# Patient Record
Sex: FEMALE | Race: WHITE
[De-identification: names, ages, dates, MRNs, and addresses within clinical notes are randomized per-mention and may not be internally consistent; named-entity substitution may affect disease eponyms.]

---

## 2021-03-09 NOTE — STRESS TEST
DATE OF SERVICE:  03/09/2021



RESTING AND POST REGADENOSON TECHNETIUM-99M TETROFOSMIN SPECT CT IMAGING 



ORDERING PHYSICIAN:

Dr. Maya.



CLINICAL DIAGNOSIS:

Chest discomfort.



Baseline images were carried out after injection of 10.17 mCi of technetium-99m

Tetrofosmin.  This was followed by 0.4 mg regadenoson and 30 mCi of

technetium-99m Tetrofosmin for stress imaging.  The electrocardiogram showed

sinus rhythm at baseline.  There was left anterior fascicular block at baseline.

 There was nonspecific ST abnormality at baseline, which became somewhat more

prominent with regadenoson infusion and then returned to her baseline.  The

patient did not report chest discomfort.  Review of images at rest and following

stress indicates an anteroapical perfusion defect that appears predominantly

transient.  Gated images show anteroapical hypokinesis.  Left ventricular

ejection fraction is calculated to be 63%.  Left ventricular end diastolic

volume is 70 mL.



CONCLUSIONS:

1.  Anteroapical ischemia, at least moderate in amount.

2.  Anteroapical hypokinesis.

3.  Well preserved global left ventricular systolic function with a calculated

ejection fraction of 63%.





Job ID: [id number]

DocumentID: [id number]

Dictated Date:  03/09/2021 16:37:08

Transcription Date: 03/09/2021 17:11:03

Dictated By: EZEKIEL MAYA MD, MA, FACP, FACC,

## 2021-04-20 ENCOUNTER — HOSPITAL ENCOUNTER (OUTPATIENT)
Dept: HOSPITAL 75 - CATH | Age: 81
LOS: 1 days | Discharge: HOME | End: 2021-04-21
Attending: INTERNAL MEDICINE
Payer: MEDICARE

## 2021-04-20 VITALS — SYSTOLIC BLOOD PRESSURE: 136 MMHG | DIASTOLIC BLOOD PRESSURE: 62 MMHG

## 2021-04-20 VITALS — SYSTOLIC BLOOD PRESSURE: 136 MMHG | DIASTOLIC BLOOD PRESSURE: 68 MMHG

## 2021-04-20 VITALS — SYSTOLIC BLOOD PRESSURE: 154 MMHG | DIASTOLIC BLOOD PRESSURE: 70 MMHG

## 2021-04-20 VITALS — DIASTOLIC BLOOD PRESSURE: 72 MMHG | SYSTOLIC BLOOD PRESSURE: 152 MMHG

## 2021-04-20 VITALS — DIASTOLIC BLOOD PRESSURE: 60 MMHG | SYSTOLIC BLOOD PRESSURE: 134 MMHG

## 2021-04-20 VITALS — DIASTOLIC BLOOD PRESSURE: 68 MMHG | SYSTOLIC BLOOD PRESSURE: 154 MMHG

## 2021-04-20 VITALS — DIASTOLIC BLOOD PRESSURE: 76 MMHG | SYSTOLIC BLOOD PRESSURE: 181 MMHG

## 2021-04-20 VITALS — DIASTOLIC BLOOD PRESSURE: 53 MMHG | SYSTOLIC BLOOD PRESSURE: 112 MMHG

## 2021-04-20 VITALS — DIASTOLIC BLOOD PRESSURE: 65 MMHG | SYSTOLIC BLOOD PRESSURE: 145 MMHG

## 2021-04-20 VITALS — BODY MASS INDEX: 24.09 KG/M2 | WEIGHT: 149.91 LBS | HEIGHT: 66.14 IN

## 2021-04-20 VITALS — SYSTOLIC BLOOD PRESSURE: 147 MMHG | DIASTOLIC BLOOD PRESSURE: 66 MMHG

## 2021-04-20 VITALS — SYSTOLIC BLOOD PRESSURE: 154 MMHG | DIASTOLIC BLOOD PRESSURE: 66 MMHG

## 2021-04-20 VITALS — DIASTOLIC BLOOD PRESSURE: 71 MMHG | SYSTOLIC BLOOD PRESSURE: 164 MMHG

## 2021-04-20 VITALS — SYSTOLIC BLOOD PRESSURE: 130 MMHG | DIASTOLIC BLOOD PRESSURE: 58 MMHG

## 2021-04-20 DIAGNOSIS — Z88.8: ICD-10-CM

## 2021-04-20 DIAGNOSIS — Z80.9: ICD-10-CM

## 2021-04-20 DIAGNOSIS — I25.119: Primary | ICD-10-CM

## 2021-04-20 DIAGNOSIS — Z79.899: ICD-10-CM

## 2021-04-20 DIAGNOSIS — Z88.1: ICD-10-CM

## 2021-04-20 DIAGNOSIS — I10: ICD-10-CM

## 2021-04-20 DIAGNOSIS — Z91.018: ICD-10-CM

## 2021-04-20 DIAGNOSIS — Z83.3: ICD-10-CM

## 2021-04-20 DIAGNOSIS — E78.2: ICD-10-CM

## 2021-04-20 DIAGNOSIS — Z79.82: ICD-10-CM

## 2021-04-20 LAB
ALBUMIN SERPL-MCNC: 3.8 GM/DL (ref 3.2–4.5)
ALP SERPL-CCNC: 95 U/L (ref 40–136)
ALT SERPL-CCNC: 12 U/L (ref 0–55)
APTT BLD: 32 SEC (ref 24–35)
BILIRUB SERPL-MCNC: 0.3 MG/DL (ref 0.1–1)
BUN/CREAT SERPL: 20
CALCIUM SERPL-MCNC: 10 MG/DL (ref 8.5–10.1)
CHLORIDE SERPL-SCNC: 106 MMOL/L (ref 98–107)
CHOLEST SERPL-MCNC: 161 MG/DL (ref ?–200)
CO2 SERPL-SCNC: 23 MMOL/L (ref 21–32)
CREAT SERPL-MCNC: 1.22 MG/DL (ref 0.6–1.3)
GFR SERPLBLD BASED ON 1.73 SQ M-ARVRAT: 43 ML/MIN
GLUCOSE SERPL-MCNC: 104 MG/DL (ref 70–105)
HCT VFR BLD CALC: 36 % (ref 35–52)
HDLC SERPL-MCNC: 39 MG/DL (ref 40–60)
HGB BLD-MCNC: 10.6 G/DL (ref 11.5–16)
INR PPP: 1.1 (ref 0.8–1.4)
MCH RBC QN AUTO: 23 PG (ref 25–34)
MCHC RBC AUTO-ENTMCNC: 30 G/DL (ref 32–36)
MCV RBC AUTO: 77 FL (ref 80–99)
PLATELET # BLD: 443 10^3/UL (ref 130–400)
PMV BLD AUTO: 9.8 FL (ref 9–12.2)
POTASSIUM SERPL-SCNC: 3.6 MMOL/L (ref 3.6–5)
PROT SERPL-MCNC: 8 GM/DL (ref 6.4–8.2)
PROTHROMBIN TIME: 14.4 SEC (ref 12.2–14.7)
SODIUM SERPL-SCNC: 144 MMOL/L (ref 135–145)
TRIGL SERPL-MCNC: 106 MG/DL (ref ?–150)
VLDLC SERPL CALC-MCNC: 21 MG/DL (ref 5–40)
WBC # BLD AUTO: 9.1 10^3/UL (ref 4.3–11)

## 2021-04-20 PROCEDURE — 85610 PROTHROMBIN TIME: CPT

## 2021-04-20 PROCEDURE — 80061 LIPID PANEL: CPT

## 2021-04-20 PROCEDURE — 85730 THROMBOPLASTIN TIME PARTIAL: CPT

## 2021-04-20 PROCEDURE — 80053 COMPREHEN METABOLIC PANEL: CPT

## 2021-04-20 PROCEDURE — 87081 CULTURE SCREEN ONLY: CPT

## 2021-04-20 PROCEDURE — 93005 ELECTROCARDIOGRAM TRACING: CPT

## 2021-04-20 PROCEDURE — 93458 L HRT ARTERY/VENTRICLE ANGIO: CPT

## 2021-04-20 PROCEDURE — 36415 COLL VENOUS BLD VENIPUNCTURE: CPT

## 2021-04-20 PROCEDURE — 85027 COMPLETE CBC AUTOMATED: CPT

## 2021-04-20 PROCEDURE — 80048 BASIC METABOLIC PNL TOTAL CA: CPT

## 2021-04-20 RX ADMIN — SODIUM CHLORIDE SCH MLS/HR: 900 INJECTION, SOLUTION INTRAVENOUS at 07:26

## 2021-04-20 RX ADMIN — SODIUM CHLORIDE SCH MLS/HR: 900 INJECTION, SOLUTION INTRAVENOUS at 17:15

## 2021-04-20 RX ADMIN — CARVEDILOL SCH MG: 12.5 TABLET, FILM COATED ORAL at 21:10

## 2021-04-20 NOTE — CARDIAC CATHETERIZATION
DATE OF SERVICE:  04/20/2021



CARDIAC CATHETERIZATION AND CORONARY INTERVENTION REPORT



The patient is a 81-year-old lady, who has been experiencing symptoms of angina.

 Myocardial perfusion imaging had indicated anteroapical ischemia.  She had not

agreed to cardiac catheterization previously.  Because of continuing symptoms,

she agreed.  Informed consent was obtained for cardiac catheterization and

coronary intervention, if needed.



DESCRIPTION OF PROCEDURE:

She was brought to the cardiac catheterization laboratory in a fasting state. 

Right groin was prepared and draped in the usual sterile fashion.  Lidocaine 1%

was used for local anesthesia.  Modified Seldinger technique was used to advance

a 5-Iraqi sheath in right femoral artery.  We used 5-Iraqi JL4 catheter for

left coronary angiography, and 5-Iraqi JR4 catheter for right coronary

angiography.  We used a pigtail catheter for left heart catheterization and left

ventricular angiography.  Subsequently, percutaneous intervention was carried

out to the left anterior descending and it is first diagonal branch as is

described below.



PERCUTANEOUS INTERVENTION TO THE MID LEFT ANTERIOR DESCENDING ARTERY AND TO THE

FIRST DIAGONAL:

The sheath was exchanged over a wire for a 7-Iraqi sheath.  We used a 7-Iraqi

guide catheter.  We used two ChoICE floppy wires, one was advanced across the

lesion in the ostial part of the first diagonal and the tip was placed in the

distal vessel.  The second wire was advanced across the lesion in the proximal

and mid left anterior descending and the tip was placed in the distal vessel. 

We carried out kissing balloon angioplasty with a 2.0 x 20 mm balloon in the

diagonal and 2.0 x 30 mm balloon in the left anterior descending.  Subsequently,

following removal of balloons.  We stented the left anterior descending with

Alpine Xience 2.75 x 28 mm stent that was deployed at 14 atmospheres.  The wire

was retained in the diagonal and provided guidance for advancing the wire in the

left anterior descending into the diagonal and then the original wire in the

diagonal was removed intact.  We used the new wire in the diagonal to carry out

balloon angioplasty of the ostial part of the diagonal again.  Final results

were got.  There was no significant residual stenosis in the proximal and mid

left anterior descending.  There was approximately 40% stenosis in the ostial

first diagonal.  Angioplasty equipment was removed.  The sheath was sutured in

place.  The patient was transferred to the floor for manual sheath removal.



HEMODYNAMICS:

Left ventricular end-diastolic pressure following coronary angiography was 14

mmHg.  There was no significant pressure gradient on pullback across the aortic

valve.  Ascending aortic pressure was 106/56 with a mean of 78 mmHg.



CORONARY ANGIOGRAPHY:

Diffuse coronary calcification seen.  Left main coronary artery does not exhibit

significant obstructive disease.  Left anterior descending artery had 80% to 90%

mid vessel stenosis at the site of origin of a first diagonal.  This was a

bifurcation lesion.  Following balloon angioplasty in the first diagonal and the

stenting in the proximal and mid left anterior descending, there is no

significant residual stenosis.  The stent deployed in the proximal and mid left

anterior descending is Alpine Xience 2.75 x 28 mm.  The left circumflex artery

has approximately 50% mid vessel stenosis.  A small caliber first obtuse

marginal branch is occluded in its proximal portion.  The right coronary artery

is dominant and has multiple 40% stenosis in its proximal and mid portions.



LEFT VENTRICULAR ANGIOGRAPHY:

Left ventricular angiography was carried out in the right anterior oblique

projection.  Global left ventricular systolic function is normal to

hyperdynamic.  Left ventricular ejection fraction is 65% to 70%.



CONCLUSIONS:

1.  Coronary artery disease primarily consisting of up to 90% proximal and mid

vessel stenosis of the left anterior descending and approximately 80% stenosis

of the ostium of the first diagonal.  The left anterior descending artery was

treated with stenting with Alpine Xience 2.75 x 28 mm stent.  The first diagonal

was treated with balloon angioplasty of the ostium and the proximal portion.  
The

left circumflex artery has 50% mid vessel stenosis and its first obtuse marginal

branch (of a small caliber) is occluded in its proximal portion.  Right coronary

artery is dominant and has mild to moderate proximal disease.

2.  Normal to hyperdynamic left ventricular systolic function with ejection

fraction 65 to 70%.

3.  Mildly elevated left ventricular end-diastolic pressure.



DISCUSSION AND RECOMMENDATIONS:

Dual antiplatelet therapy has been added to the regimen.  Beta blockers are

being continued.  She states that she cannot take any cholesterol medicine

whatsoever.  Risk factor modification has been reviewed.





Job ID: [id number]

DocumentID: [id number]

Dictated Date:  04/20/2021 12:03:29

Transcription Date: 04/20/2021 12:48:41

Dictated By: EZEKIEL CAMPOS MD, MA, FACP, FACC,

MTDD

## 2021-04-20 NOTE — CARDIAC PROCEDURE NOTE-CS/ASA
Pre-Procedure Note


Pre-Op Procedure Note


H&P Reviewed


The H&P was reviewed, patient examined and no changes noted.


Date H&P Reviewed:  Apr 20, 2021


Time H&P Reviewed:  10:30





Conscious Sedation Pre-Proced


Time


10:30





ASA Score


3


For ASA 3 and 4: Consider anesthesia and medical clearance. Also, for patients

with a history of failed moderate sedation consider anesthesia.

















Airway 


 


Lungs 


 


Heart 


 


 ASA score


 


 ASA 1: a normal healthy patient


 


 ASA 2:  a patient with a mild systemic disease (mid diabetes, controlled 

hypertension, obesity 


 


 ASA 3:  a patient with a severe systemic disease that limits activity  (angina,

COPD, prior Myocardial infarction)


 


 ASA 4:  a patient with an incapacitating disease that is a constant threat to 

life (CHF, renal failure)


 


 ASA 5:  a moribund patient not expected to survive 24 hrs.  (ruptured aneurysm)


 


 ASA 6:  a declared brain-dead patient whose organs are being harvested.


 


 For emergent operations, add the letter E after the classification











Mallampati Classification


Grade 2





Sedation Plan


Analgesia, Amnesia, Plan communicated to team members, Discussed options with 

patient/fam, Discussed risks with patient/fam


The patient is an appropriate candidate to undergo the planned procedure, 

sedation, and anesthesia.





The patient immediately re-assessed prior to indication.











EZEKIEL CAMPOS MD FACP FAC CCDS   Apr 20, 2021 11:51

## 2021-04-21 VITALS — SYSTOLIC BLOOD PRESSURE: 98 MMHG | DIASTOLIC BLOOD PRESSURE: 53 MMHG

## 2021-04-21 VITALS — SYSTOLIC BLOOD PRESSURE: 145 MMHG | DIASTOLIC BLOOD PRESSURE: 61 MMHG

## 2021-04-21 VITALS — SYSTOLIC BLOOD PRESSURE: 120 MMHG | DIASTOLIC BLOOD PRESSURE: 55 MMHG

## 2021-04-21 LAB
BUN/CREAT SERPL: 26
CALCIUM SERPL-MCNC: 8.8 MG/DL (ref 8.5–10.1)
CHLORIDE SERPL-SCNC: 106 MMOL/L (ref 98–107)
CO2 SERPL-SCNC: 24 MMOL/L (ref 21–32)
CREAT SERPL-MCNC: 0.95 MG/DL (ref 0.6–1.3)
GFR SERPLBLD BASED ON 1.73 SQ M-ARVRAT: 57 ML/MIN
GLUCOSE SERPL-MCNC: 92 MG/DL (ref 70–105)
HCT VFR BLD CALC: 30 % (ref 35–52)
HGB BLD-MCNC: 8.6 G/DL (ref 11.5–16)
MCH RBC QN AUTO: 22 PG (ref 25–34)
MCHC RBC AUTO-ENTMCNC: 29 G/DL (ref 32–36)
MCV RBC AUTO: 78 FL (ref 80–99)
PLATELET # BLD: 306 10^3/UL (ref 130–400)
PMV BLD AUTO: 10.1 FL (ref 9–12.2)
POTASSIUM SERPL-SCNC: 3.9 MMOL/L (ref 3.6–5)
SODIUM SERPL-SCNC: 140 MMOL/L (ref 135–145)
WBC # BLD AUTO: 6.9 10^3/UL (ref 4.3–11)

## 2021-04-21 RX ADMIN — CARVEDILOL SCH MG: 12.5 TABLET, FILM COATED ORAL at 08:31

## 2021-04-21 RX ADMIN — SODIUM CHLORIDE SCH MLS/HR: 900 INJECTION, SOLUTION INTRAVENOUS at 03:40

## 2021-04-21 NOTE — PROGRESS NOTE - CARDIOLOGY
Cardiology SOAP Progress Note


Objective:


I&O/Vital Signs











 21





 00:00 00:00 01:00 04:00


 


Pulse  61 63 59


 


Resp  18  14


 


B/P (MAP)  98/53 (68)  120/55 (76)


 


Pulse Ox 94 97  99


 


O2 Delivery Nasal Cannula Nasal Cannula  Nasal Cannula


 


O2 Flow Rate 2.00 2.00  2.00





 21





 04:00 06:30 08:00 08:00


 


Temp   36.8 


 


Pulse  58  83


 


Resp    18


 


B/P (MAP)    145/61 (89)


 


Pulse Ox 94   97


 


O2 Delivery Nasal Cannula   Nasal Cannula


 


O2 Flow Rate 2.00   2.00


 


    





 21   





 08:00   


 


Pulse Ox 96   


 


O2 Delivery Room Air   














 21





 00:00


 


Intake Total 250 ml


 


Output Total 450 ml


 


Balance -200 ml








Constitutional:  AAO x 3, well-developed, well-nourished


Cardiovascular:  regular rate-rhythm; No JVD; S1 and S2


Gastrointestional:  No tender; soft, round


Extremities:  no lower extremity edema bilateral


Neurologic/Psychiatric:  grossly intact (moves all extremities)


Skin:  No rash on exposed areas, No ulcerations on exposed areas





Results/Procedures:


Labs


Laboratory Tests


21 02:38: 


White Blood Count 6.9, Red Blood Count 3.86, Hemoglobin 8.6L, Hematocrit 30L, 

Mean Corpuscular Volume 78L, Mean Corpuscular Hemoglobin 22L, Mean Corpuscular 

Hemoglobin Concent 29L, Red Cell Distribution Width 16.0H, Platelet Count 306, 

Mean Platelet Volume 10.1, Sodium Level 140, Potassium Level 3.9, Chloride Level

106, Carbon Dioxide Level 24, Anion Gap 10, Blood Urea Nitrogen 25H, Creatinine 

0.95, Estimat Glomerular Filtration Rate 57, BUN/Creatinine Ratio 26, Glucose 

Level 92, Calcium Level 8.8





Microbiology


21 MRSA Screen - Final, Complete


          








A/P:


Assessment:


CAD


- MPI of 3-9-21: Anteroapical ischemia, at least moderate in amount. 

Anteroapical hypokinesis. Well preserved global left ventricular systolic 

function with a calculated ejection fraction of 63%.


- Coronary artery disease primarily consisting of up to 90% proximal and mid 

vessel stenosis of the left anterior descending and approximately 80% stenosis 

of the ostium of the first diagonal.  The left anterior descending artery was 

treated with stenting with Alpine Xience 2.75 x 28 mm stent.  The first diagonal

was treated with balloon angioplasty of the ostium in proximal portions.  The 

left circumflex artery has 50% mid vessel stenosis in its first obtuse marginal 

branch (of a small caliber) is occluded in its proximal portion.  Right coronary

artery is dominant and has mild to moderate proximal disease. Normal to 

hyperdynamic left ventricular systolic function with ejection fraction 65 to 

70%. Mildly elevated left ventricular end-diastolic pressure.





Microcytic, hypochromic anemia of undetermined etiology, managed by pcp (H/H 

10.5/33.9 and MCV 80 on 2021)





Renal insufficiency likely CKD 2-3 and superimposed prerenal azotemia (BUN/Cr 

60/1.99 on 21 and 33/1.22 after d/c HCTZ)





Involuntary wgt loss (60 lbs since 2019) of undetermined etiology





Echo of 3/9/21: LVEF 55-60%, grade 2 diastolic dysfunction, mild to mod LAE, mod

TR, mild AI, mod TR, PASP 65-70 mmHg





HTN: on chronic diuretic therapy





HLD: refuses stain





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





GI history: cholecystectomy in 2019; endoscopy in 2019 that showed mild 

gastritis (per pt report)





Reported intolerance to statin d/t joint pain





Fam h/o early CAD (father  of MI at age 54)


Plan:


S/P successful PCI on 21


Continue current medications


Reported intolerance to statin tx and does not wish to take


OK to discharge home today


Out pt f/u in 2-3 weeks











LUZ CACERES           2021 08:07

## 2021-04-21 NOTE — DISCHARGE INST-CARDIOLOGY
Discharge Inst-Cardiac


Discharge Medications


New Medications:  


Aspirin (Children's Aspirin) 81 Mg Tab.chew


81 MG PO DAILY, #90 TAB 3 Refills





 


Continued Medications:  


Allopurinol (Allopurinol) 300 Mg Tablet


300 MG PO DAILY, TAB





Carvedilol (Carvedilol) 25 Mg Tablet


25 MG PO BID, TAB





Clopidogrel Bisulfate (Clopidogrel) 75 Mg Tablet


75 MG PO DAILY, TAB





Ferrous Sulfate (Iron) 325 Mg Tablet


325 MG PO DAILY, TAB





Isosorbide Mononitrate (Isosorbide Mononitrate ER) 30 Mg Tab.er.24h


30 MG PO DAILY, TAB





Nitroglycerin (Nitroglycerin) 0.4 Mg Tab.subl


0.4 MG SL UD PRN for CHEST PAIN, TAB








New, Converted or Re-Newed RX:  Transmitted to Pharmacy





Patient Instructions


Patient Instructions:  


Please schedule follow up appointment to see Dr. Maya in 2-3 weeks











LUZ CACERES           Apr 21, 2021 09:14

## 2021-04-21 NOTE — PROGRESS NOTE - CARDIOLOGY
Cardiology SOAP Progress Note


Subjective:


No cp or palp or syncope or shortness of breath


Some gen malaise


No groin or leg discomfort


No n/v/d





Objective:


I&O/Vital Signs











 21





 06:30 08:00 08:00 08:00


 


Temp  36.8  


 


Pulse 58  83 


 


Resp   18 


 


B/P (MAP)   145/61 (89) 


 


Pulse Ox   97 96


 


O2 Delivery   Nasal Cannula Room Air


 


O2 Flow Rate   2.00 


 


    





 21 





 09:00 10:00 11:00 


 


Pulse 66 62  


 


Resp 27 26  


 


B/P (MAP)    


 


Pulse Ox 99 100  


 


O2 Delivery Nasal Cannula Nasal Cannula  


 


O2 Flow Rate 2.00 2.00  














 21





 00:00


 


Intake Total 250 ml


 


Output Total 450 ml


 


Balance -200 ml








Groin site without hematoma:  Yes


Bruising:  mild bruising


Constitutional:  AAO x 3, well-developed, well-nourished


Cardiovascular:  regular rate-rhythm; No JVD; S1 and S2


Gastrointestional:  No tender; soft, round


Extremities:  no lower extremity edema bilateral


Neurologic/Psychiatric:  grossly intact (moves all extremities)


Skin:  No rash on exposed areas, No ulcerations on exposed areas





Results/Procedures:


Labs


Laboratory Tests


21 02:38: 


White Blood Count 6.9, Red Blood Count 3.86, Hemoglobin 8.6L, Hematocrit 30L, 

Mean Corpuscular Volume 78L, Mean Corpuscular Hemoglobin 22L, Mean Corpuscular 

Hemoglobin Concent 29L, Red Cell Distribution Width 16.0H, Platelet Count 306, 

Mean Platelet Volume 10.1, Sodium Level 140, Potassium Level 3.9, Chloride Level

106, Carbon Dioxide Level 24, Anion Gap 10, Blood Urea Nitrogen 25H, Creatinine 

0.95, Estimat Glomerular Filtration Rate 57, BUN/Creatinine Ratio 26, Glucose 

Level 92, Calcium Level 8.8





Microbiology


21 MRSA Screen - Final, Complete


          








A/P:


Assessment:





CAD


- MPI of 3-9-21: Anteroapical ischemia, at least moderate in amount. 

Anteroapical hypokinesis. Well preserved global left ventricular systolic 

function with a calculated ejection fraction of 63%.


- Card cath 21: up to 90% proximal and mid vessel stenosis of the left 

anterior descending and approximately 80% stenosis of the ostium of the first 

diagonal.  The left anterior descending artery was treated with stenting with 

Alpine Xience 2.75 x 28 mm stent.  The first diagonal was treated with balloon 

angioplasty of the ostium in proximal portions.  The left circumflex artery has 

50% mid vessel stenosis in its first obtuse marginal branch (of a small caliber)

is occluded in its proximal portion.  Right coronary artery is dominant and has 

mild to moderate proximal disease. Normal to hyperdynamic left ventricular 

systolic function with ejection fraction 65 to 70%. Mildly elevated left ventri

cular end-diastolic pressure.





Microcytic, hypochromic anemia of undetermined etiology, managed by pcp (H/H 

10.5/33.9 and MCV 80 on 2021)





Renal insufficiency likely CKD 2-3 and superimposed prerenal azotemia (BUN/Cr 

60/1.99 on 21 and 33/1.22 after d/c HCTZ)





Involuntary wgt loss (60 lbs since 2019) of undetermined etiology





Echo of 3/9/21: LVEF 55-60%, grade 2 diastolic dysfunction, mild to mod LAE, mod

TR, mild AI, mod TR, PASP 65-70 mmHg





HTN: on chronic diuretic therapy





HLD: refuses stain





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





GI history: cholecystectomy in 2019; endoscopy in 2019 that showed mild 

gastritis (per pt report)





Reported intolerance to statin d/t joint pain





Fam h/o early CAD (father  of MI at age 54)


Plan:





I discussed her cath findings with her and the interventions undertaken


We advised compliance with meds


We discussed risk factor mod. I answered her questions


Reported intolerance to statin tx and does not wish to take


OK to discharge home today


Out pt f/u in 2-3 weeks











EZEKIEL CAMPOS MD [facility]   2021 16:47

## 2021-05-25 ENCOUNTER — HOSPITAL ENCOUNTER (OUTPATIENT)
Dept: HOSPITAL 75 - CATH | Age: 81
End: 2021-05-25
Attending: INTERNAL MEDICINE
Payer: MEDICARE

## 2021-05-25 VITALS — HEIGHT: 66.14 IN | WEIGHT: 154.32 LBS | BODY MASS INDEX: 24.8 KG/M2

## 2021-05-25 VITALS — SYSTOLIC BLOOD PRESSURE: 137 MMHG | DIASTOLIC BLOOD PRESSURE: 73 MMHG

## 2021-05-25 VITALS — DIASTOLIC BLOOD PRESSURE: 70 MMHG | SYSTOLIC BLOOD PRESSURE: 133 MMHG

## 2021-05-25 VITALS — DIASTOLIC BLOOD PRESSURE: 70 MMHG | SYSTOLIC BLOOD PRESSURE: 126 MMHG

## 2021-05-25 VITALS — SYSTOLIC BLOOD PRESSURE: 123 MMHG | DIASTOLIC BLOOD PRESSURE: 60 MMHG

## 2021-05-25 VITALS — SYSTOLIC BLOOD PRESSURE: 120 MMHG | DIASTOLIC BLOOD PRESSURE: 65 MMHG

## 2021-05-25 VITALS — SYSTOLIC BLOOD PRESSURE: 132 MMHG | DIASTOLIC BLOOD PRESSURE: 82 MMHG

## 2021-05-25 VITALS — SYSTOLIC BLOOD PRESSURE: 113 MMHG | DIASTOLIC BLOOD PRESSURE: 66 MMHG

## 2021-05-25 VITALS — SYSTOLIC BLOOD PRESSURE: 129 MMHG | DIASTOLIC BLOOD PRESSURE: 84 MMHG

## 2021-05-25 VITALS — DIASTOLIC BLOOD PRESSURE: 61 MMHG | SYSTOLIC BLOOD PRESSURE: 123 MMHG

## 2021-05-25 DIAGNOSIS — I70.1: ICD-10-CM

## 2021-05-25 DIAGNOSIS — I27.21: ICD-10-CM

## 2021-05-25 DIAGNOSIS — Z80.9: ICD-10-CM

## 2021-05-25 DIAGNOSIS — I34.0: ICD-10-CM

## 2021-05-25 DIAGNOSIS — I25.10: ICD-10-CM

## 2021-05-25 DIAGNOSIS — N28.9: ICD-10-CM

## 2021-05-25 DIAGNOSIS — Z83.3: ICD-10-CM

## 2021-05-25 DIAGNOSIS — I11.9: ICD-10-CM

## 2021-05-25 DIAGNOSIS — R09.89: ICD-10-CM

## 2021-05-25 DIAGNOSIS — D50.9: ICD-10-CM

## 2021-05-25 DIAGNOSIS — Z79.82: ICD-10-CM

## 2021-05-25 DIAGNOSIS — Z90.49: ICD-10-CM

## 2021-05-25 DIAGNOSIS — I70.202: ICD-10-CM

## 2021-05-25 DIAGNOSIS — E78.2: ICD-10-CM

## 2021-05-25 DIAGNOSIS — I99.8: ICD-10-CM

## 2021-05-25 DIAGNOSIS — Z82.49: ICD-10-CM

## 2021-05-25 DIAGNOSIS — I10: ICD-10-CM

## 2021-05-25 DIAGNOSIS — I77.1: Primary | ICD-10-CM

## 2021-05-25 DIAGNOSIS — Z79.899: ICD-10-CM

## 2021-05-25 DIAGNOSIS — G47.33: ICD-10-CM

## 2021-05-25 LAB
ALBUMIN SERPL-MCNC: 3.9 GM/DL (ref 3.2–4.5)
ALP SERPL-CCNC: 104 U/L (ref 40–136)
ALT SERPL-CCNC: 11 U/L (ref 0–55)
APTT BLD: 31 SEC (ref 24–35)
BILIRUB SERPL-MCNC: 0.4 MG/DL (ref 0.1–1)
BUN/CREAT SERPL: 25
CALCIUM SERPL-MCNC: 9.9 MG/DL (ref 8.5–10.1)
CHLORIDE SERPL-SCNC: 103 MMOL/L (ref 98–107)
CHOLEST SERPL-MCNC: 171 MG/DL (ref ?–200)
CO2 SERPL-SCNC: 27 MMOL/L (ref 21–32)
CREAT SERPL-MCNC: 1.25 MG/DL (ref 0.6–1.3)
GFR SERPLBLD BASED ON 1.73 SQ M-ARVRAT: 41 ML/MIN
GLUCOSE SERPL-MCNC: 123 MG/DL (ref 70–105)
HCT VFR BLD CALC: 37 % (ref 35–52)
HDLC SERPL-MCNC: 43 MG/DL (ref 40–60)
HGB BLD-MCNC: 10.9 G/DL (ref 11.5–16)
INR PPP: 1 (ref 0.8–1.4)
MCH RBC QN AUTO: 23 PG (ref 25–34)
MCHC RBC AUTO-ENTMCNC: 29 G/DL (ref 32–36)
MCV RBC AUTO: 78 FL (ref 80–99)
PLATELET # BLD: 407 10^3/UL (ref 130–400)
PMV BLD AUTO: 10.3 FL (ref 9–12.2)
POTASSIUM SERPL-SCNC: 3.9 MMOL/L (ref 3.6–5)
PROT SERPL-MCNC: 7.8 GM/DL (ref 6.4–8.2)
PROTHROMBIN TIME: 13.8 SEC (ref 12.2–14.7)
SODIUM SERPL-SCNC: 140 MMOL/L (ref 135–145)
TRIGL SERPL-MCNC: 99 MG/DL (ref ?–150)
VLDLC SERPL CALC-MCNC: 20 MG/DL (ref 5–40)
WBC # BLD AUTO: 9.5 10^3/UL (ref 4.3–11)

## 2021-05-25 PROCEDURE — 80053 COMPREHEN METABOLIC PANEL: CPT

## 2021-05-25 PROCEDURE — 75625 CONTRAST EXAM ABDOMINL AORTA: CPT

## 2021-05-25 PROCEDURE — 87081 CULTURE SCREEN ONLY: CPT

## 2021-05-25 PROCEDURE — 80061 LIPID PANEL: CPT

## 2021-05-25 PROCEDURE — 85027 COMPLETE CBC AUTOMATED: CPT

## 2021-05-25 PROCEDURE — 75716 ARTERY X-RAYS ARMS/LEGS: CPT

## 2021-05-25 PROCEDURE — 85730 THROMBOPLASTIN TIME PARTIAL: CPT

## 2021-05-25 PROCEDURE — 85610 PROTHROMBIN TIME: CPT

## 2021-05-25 PROCEDURE — 36415 COLL VENOUS BLD VENIPUNCTURE: CPT

## 2021-05-25 NOTE — CARDIAC PROCEDURE NOTE-CS/ASA
Pre-Procedure Note


Pre-Op Procedure Note


H&P Reviewed


The H&P was reviewed, patient examined and no changes noted.


Date H&P Reviewed:  May 25, 2021


Time H&P Reviewed:  10:30





Conscious Sedation Pre-Proced


Time


10:30





ASA Score


3


For ASA 3 and 4: Consider anesthesia and medical clearance. Also, for patients

with a history of failed moderate sedation consider anesthesia.

















Airway 


 


Lungs 


 


Heart 


 


 ASA score


 


 ASA 1: a normal healthy patient


 


 ASA 2:  a patient with a mild systemic disease (mid diabetes, controlled 

hypertension, obesity 


 


 ASA 3:  a patient with a severe systemic disease that limits activity  (angina,

COPD, prior Myocardial infarction)


 


 ASA 4:  a patient with an incapacitating disease that is a constant threat to 

life (CHF, renal failure)


 


 ASA 5:  a moribund patient not expected to survive 24 hrs.  (ruptured aneurysm)


 


 ASA 6:  a declared brain-dead patient whose organs are being harvested.


 


 For emergent operations, add the letter E after the classification











Mallampati Classification


Grade 2





Sedation Plan


Analgesia, Amnesia, Plan communicated to team members, Discussed options with 

patient/fam, Discussed risks with patient/fam


The patient is an appropriate candidate to undergo the planned procedure, 

sedation, and anesthesia.





The patient immediately re-assessed prior to indication.











EZEKIEL CAMPOS MD FACP FAC CCDS   May 25, 2021 11:17

## 2021-05-25 NOTE — DISCHARGE INST-POST CATH
Discharge Inst-CATH/EP


Post Cardiac Cath/EP D/C Inst


Follow Up/Plan


F/u with Dr Maya next week





No smoking








ACTIVITY





* Go Home directly and rest.


* Limit activity of the leg (or wrist if it was used) for 7 days including 

aerobics, swimming,


   jogging, bicycling, etc.


* Restrict stair-climbing for 7 days if possible, if not, climb up with your 

non-cath leg, then


   bring together on the same step.


* Avoid lifting, pushing, pulling or excessive movement of the affected 

extremity for 7 days.


* Customary sexual activity may be resumed after 2 days-use caution not to use a

position  


   that strains or causes pain to the affected extremity.


* No driving for 24 hours.


* NO SMOKING. 


* Avoid straining for bowel movements for 7 days.


* Gentle walking on level ground is allowed.


* Returning to work will depend on the type of procedure and the results. Your 

doctor will discuss


   this with you.





CALL YOUR DOCTOR FOR ANY OF THE FOLLOWING:





*If bleeding from the puncture site occurs- Apply gentle pressure to site with 

clean cloth and call


   your doctor or EMS.


* If a knot or lump forms under the skin, increases in size, or causes pain.


* If bruising appears to be worsening or moving further down your leg instead of

disappearing.


* Temperature above 101 F.





CARE OF YOUR GROIN INCISION;





* Bruising or purple discoloration of the skin near the puncture site is common.


* You may shower only, no bathtub bathing for 5 days.  Be careful to avoid 

slipping as your


   leg may feel stiff.


* If a closure device was used on your femoral artery, please see the attached 

guide regarding


   care of the device and your leg.


* Leave dressing on FOR 24 hours.





CARE OF YOUR WRIST INCISION;





* Bruising or purple discoloration of the skin near the puncture site is common.


* You may shower.


* DO NOT submerge wrist.


* Leave dressing on FOR 24 hours.











EZEKIEL MAYA MD FACP FAC CCDS   May 25, 2021 11:22

## 2021-05-25 NOTE — DISCHARGE INST-CARDIOLOGY
Discharge Inst-Cardiac


Discharge Medications


Continued Medications:  


Allopurinol (Allopurinol) 300 Mg Tablet


300 MG PO DAILY, TAB





Aspirin (Children's Aspirin) 81 Mg Tab.chew


81 MG PO DAILY, #90 TAB 3 Refills





Carvedilol (Carvedilol) 25 Mg Tablet


25 MG PO BID, TAB





Clopidogrel Bisulfate (Clopidogrel) 75 Mg Tablet


75 MG PO DAILY, TAB





Ferrous Sulfate (Iron) 325 Mg Tablet


325 MG PO DAILY, TAB





Isosorbide Mononitrate (Isosorbide Mononitrate ER) 30 Mg Tab.er.24h


30 MG PO DAILY, TAB





Nitroglycerin (Nitroglycerin) 0.4 Mg Tab.subl


0.4 MG SL UD PRN for CHEST PAIN, TAB











Patient Instructions


Patient Instructions:  


No smoking











EZEKIEL CAMPOS MD FACP FAC CCDS   May 25, 2021 11:23

## 2021-05-25 NOTE — OPERATIVE REPORT
DATE OF SERVICE:  05/25/2021



ABDOMINAL AORTIC AND PERIPHERAL ANGIOGRAPHY REPORT



The patient is a 81-year-old lady who has leg claudication that affects mostly

her right lower limb.  Noninvasive workup has been indicative of significant

disease.  Peripheral angiography was recommended and informed consent was

obtained.



DESCRIPTION OF PROCEDURE:

She was brought to the cardiac catheterization laboratory in a fasting state. 

Left groin was prepared and draped in the usual sterile fashion.  Lidocaine 1%

was used for local anesthesia.  Modified Seldinger technique was used to advance

a 5-Togolese sheath in the left femoral artery.  We used a 5-Togolese pigtail

catheter and placed it at the level of L1.  We performed abdominal aortic

angiography.  We then pulled the pigtail catheter down to just above the level

of the aortoiliac bifurcation and bilateral leg artery angiography was performed

with runoff down to the level of the ankles.  Angiography of the right femoral

artery was carried out through the sheath.  Mynx was used to achieve hemostasis

following sheath removal.  She tolerated the procedure well.



ABDOMINAL AORTIC ANGIOGRAPHY:

Abdominal aortic angiography did not indicate any abdominal aortic aneurysm,

dissection or abdominal aortic stenosis.  Renal arteries are identified.  There

is 80% to 90% ostial and proximal stenosis of the right renal artery.  There is

approximately 50% stenosis of the ostial portion of the left renal artery.  The

aortoiliac bifurcation is intact and does not exhibit significant disease.  No

significant disease is seen in the proximal part of the iliac arteries.



BILATERAL LEG ARTERY ANGIOGRAPHY:

Bilateral leg artery angiography indicated intact iliac and common femoral

arteries on both sides.  On the right side, the superficial femoral artery

exhibits a long segment of complete occlusion.  That starts in the proximal

portion of the right superficial femoral artery and the artery then

reconstitutes very distally at the level of the right popliteal artery.  The

right popliteal artery has a single vessel runoff, which appears to be deep

peroneal artery.  The anterior and posterior tibial arteries appear occluded. 

On the left side, the superficial femoral artery has multiple severe stenoses of

up to 80% to 90%.  The popliteal artery is intact.  There is a 2-vessel runoff

in the left leg and these appeared to be posterior tibial and peroneal arteries.

 These arteries do have significant proximal vessel stenoses of up to 80%.



CONCLUSIONS:

1.  80 to 90% ostial stenosis of the right renal artery.

2.  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

that reconstitutes at the level of the right popliteal artery and then there is

a single vessel runoff in the right leg.

3.  Multiple 80% to 90% stenosis in the left superficial femoral artery and a

2-vessel runoff in the left leg (these two runoff vessels have severe proximal

disease, as well).



DISCUSSION AND RECOMMENDATIONS:

Based on these findings, it appears that femoral-popliteal surgery should be

considered for the right side.  Right side is the leg that has claudication. 

She is not reporting much claudication on the left side.  Accordingly, we plan

to treat the right leg first.  We called Dr. Tamez of cardiovascular surgical

service at [facility] and spoke with him and have made a referral to him

for further evaluation and treatment.  Her antiplatelet regimen is being

continued.  She may be a good candidate for statin therapy, but she refuses.  We

have advised her to quit smoking immediately and completely.  Risk factor

modification has been reviewed with her.  Outpatient followup is advised.





Job ID: [id number]

DocumentID: [id number]

Dictated Date:  05/25/2021 11:32:19

Transcription Date: 05/25/2021 11:59:11

Dictated By: EZEKIEL CAMPOS MD, MA, FACP, FACC,

## 2021-10-05 ENCOUNTER — HOSPITAL ENCOUNTER (OUTPATIENT)
Dept: HOSPITAL 75 - CATH | Age: 81
Discharge: HOME | End: 2021-10-05
Attending: INTERNAL MEDICINE
Payer: MEDICARE

## 2021-10-05 VITALS — BODY MASS INDEX: 25.1 KG/M2 | WEIGHT: 154.32 LBS | HEIGHT: 65.75 IN

## 2021-10-05 VITALS — SYSTOLIC BLOOD PRESSURE: 174 MMHG | DIASTOLIC BLOOD PRESSURE: 79 MMHG

## 2021-10-05 VITALS — DIASTOLIC BLOOD PRESSURE: 89 MMHG | SYSTOLIC BLOOD PRESSURE: 193 MMHG

## 2021-10-05 VITALS — DIASTOLIC BLOOD PRESSURE: 70 MMHG | SYSTOLIC BLOOD PRESSURE: 159 MMHG

## 2021-10-05 VITALS — DIASTOLIC BLOOD PRESSURE: 72 MMHG | SYSTOLIC BLOOD PRESSURE: 166 MMHG

## 2021-10-05 VITALS — DIASTOLIC BLOOD PRESSURE: 66 MMHG | SYSTOLIC BLOOD PRESSURE: 161 MMHG

## 2021-10-05 VITALS — DIASTOLIC BLOOD PRESSURE: 67 MMHG | SYSTOLIC BLOOD PRESSURE: 161 MMHG

## 2021-10-05 VITALS — DIASTOLIC BLOOD PRESSURE: 70 MMHG | SYSTOLIC BLOOD PRESSURE: 161 MMHG

## 2021-10-05 DIAGNOSIS — E78.5: ICD-10-CM

## 2021-10-05 DIAGNOSIS — Z88.8: ICD-10-CM

## 2021-10-05 DIAGNOSIS — Z82.49: ICD-10-CM

## 2021-10-05 DIAGNOSIS — I11.9: ICD-10-CM

## 2021-10-05 DIAGNOSIS — N28.9: ICD-10-CM

## 2021-10-05 DIAGNOSIS — Z95.5: ICD-10-CM

## 2021-10-05 DIAGNOSIS — Z79.82: ICD-10-CM

## 2021-10-05 DIAGNOSIS — R63.4: ICD-10-CM

## 2021-10-05 DIAGNOSIS — I73.9: ICD-10-CM

## 2021-10-05 DIAGNOSIS — Z88.5: ICD-10-CM

## 2021-10-05 DIAGNOSIS — Z88.1: ICD-10-CM

## 2021-10-05 DIAGNOSIS — D50.9: ICD-10-CM

## 2021-10-05 DIAGNOSIS — I27.21: ICD-10-CM

## 2021-10-05 DIAGNOSIS — Z79.899: ICD-10-CM

## 2021-10-05 DIAGNOSIS — I25.10: Primary | ICD-10-CM

## 2021-10-05 LAB
ALBUMIN SERPL-MCNC: 3.8 GM/DL (ref 3.2–4.5)
ALP SERPL-CCNC: 116 U/L (ref 40–136)
ALT SERPL-CCNC: 11 U/L (ref 0–55)
APTT BLD: 30 SEC (ref 24–35)
BILIRUB SERPL-MCNC: 0.3 MG/DL (ref 0.1–1)
BUN/CREAT SERPL: 27
CALCIUM SERPL-MCNC: 10.2 MG/DL (ref 8.5–10.1)
CHLORIDE SERPL-SCNC: 106 MMOL/L (ref 98–107)
CHOLEST SERPL-MCNC: 173 MG/DL (ref ?–200)
CO2 SERPL-SCNC: 27 MMOL/L (ref 21–32)
CREAT SERPL-MCNC: 1.15 MG/DL (ref 0.6–1.3)
GFR SERPLBLD BASED ON 1.73 SQ M-ARVRAT: 46 ML/MIN
GLUCOSE SERPL-MCNC: 104 MG/DL (ref 70–105)
HCT VFR BLD CALC: 37 % (ref 35–52)
HDLC SERPL-MCNC: 42 MG/DL (ref 40–60)
HGB BLD-MCNC: 11 G/DL (ref 11.5–16)
INR PPP: 1 (ref 0.8–1.4)
MCH RBC QN AUTO: 24 PG (ref 25–34)
MCHC RBC AUTO-ENTMCNC: 30 G/DL (ref 32–36)
MCV RBC AUTO: 81 FL (ref 80–99)
PLATELET # BLD: 380 10^3/UL (ref 130–400)
PMV BLD AUTO: 9.8 FL (ref 9–12.2)
POTASSIUM SERPL-SCNC: 3.9 MMOL/L (ref 3.6–5)
PROT SERPL-MCNC: 7.7 GM/DL (ref 6.4–8.2)
PROTHROMBIN TIME: 14.1 SEC (ref 12.2–14.7)
SODIUM SERPL-SCNC: 144 MMOL/L (ref 135–145)
TRIGL SERPL-MCNC: 84 MG/DL (ref ?–150)
VLDLC SERPL CALC-MCNC: 17 MG/DL (ref 5–40)
WBC # BLD AUTO: 9.1 10^3/UL (ref 4.3–11)

## 2021-10-05 PROCEDURE — 85610 PROTHROMBIN TIME: CPT

## 2021-10-05 PROCEDURE — 85730 THROMBOPLASTIN TIME PARTIAL: CPT

## 2021-10-05 PROCEDURE — 80053 COMPREHEN METABOLIC PANEL: CPT

## 2021-10-05 PROCEDURE — 93458 L HRT ARTERY/VENTRICLE ANGIO: CPT

## 2021-10-05 PROCEDURE — 36415 COLL VENOUS BLD VENIPUNCTURE: CPT

## 2021-10-05 PROCEDURE — 80061 LIPID PANEL: CPT

## 2021-10-05 PROCEDURE — 85027 COMPLETE CBC AUTOMATED: CPT

## 2021-10-05 PROCEDURE — 87081 CULTURE SCREEN ONLY: CPT

## 2021-10-05 NOTE — XMS REPORT
MU2 Ambulatory Summary

                             Created on: 2021



Laury Og

External Reference #: [id number]

: 1940

Sex: Female



Demographics





                          Address                   [address]

[place]  [address]

 

                          Home Phone                [phone]

 

                          Preferred Language        English

 

                          Marital Status            

 

                          Rastafarian Affiliation     Unknown

 

                          Race                      White

 

                          Ethnic Group              Not  or 





Author





                          Author                    Laury Conley

 

                          Organization              [facility]

[place]

 

                          Address                   [address]

[place], [place]  [id number]



 

                          Phone                     [phone]







Care Team Providers





                    Care Team Member Name Role                Phone

 

                    Joe Conley     PCP                 [phone]

 

                    Joe Conley     PreferredProvider   [phone]







Allergies and Adverse Reactions





                    Name                Reaction            Notes

 

                    erythromycin        vomiting             

 

                    STATINS: HMG-COA REDUCTASE INHIBITORS joint pain           

 

                    BANANAS             severe stomache pain  

 

                    Demerol             vomiting             







Plan of Treatment





             Planned Activity Comments     Planned Date Planned Time Plan/Goal

 

             gastritis and anemia, needing endsocpy                             

            

 

             EKG.                      2018   12:00 AM      

 

             CBC W/ AUTO DIFF (RFLX MAN DIFF IF IND).              2021     

12:00 AM      







Medications





                                        Active 

 

             Name         Start Date   Estimated Completion Date SIG          Co

mments

 

             aspirin 81 mg oral tablet                           take 1 tablet b

y oral route daily  

 

             biotin 5 mg oral capsule                           take 1 capsule b

y oral route daily  

 

                vitamin B12-folic acid 500-400 mcg oral tablet                  

               take 1 tablet by oral route 

daily                                    

 

                ibuprofen 800 mg oral tablet                                 noemi

e 1 tablet (800 mg) by oral route 3 times 

per day with food                        

 

                potassium chloride 20 mEq oral tablet extended release 2019

                       take 1 tablet

(20 meq) by oral route 2 times per day with food  

 

                Lomotil 2.5-0.025 mg oral tablet                                

 take 2 tablets (5 mg) by oral route once 

daily as needed                          

 

                Levaquin 500 mg oral tablet                                 take

 1 tablet (500 mg) by oral route once daily 

for 7 days                               

 

                hydrochlorothiazide 25 mg oral tablet 2019                

      TAKE 1 TABLET BY MOUTH EVERY 

DAY                                      

 

                HYDROCHLOROTHIAZIDE 25MG TABLETS 12/10/2020      2021      

 TAKE 1 TABLET BY MOUTH 

EVERY DAY                                

 

                clopidogrel 75 mg oral tablet                                 ta

ke 1 tablet (75 mg) by oral route once daily

for 30 days                              

 

                Iron (ferrous sulfate) 325 mg (65 mg iron) oral tablet 2021       take 

1 tablet (325 mg) by oral route once daily for 90 days  

 

                isosorbide mononitrate 30 mg oral tablet extended release 24 hr 

                                take 1 

tablet (30 mg) by oral route once daily in the morning for 90 days  

 

                melatonin 3 mg oral tablet 2021       take 

1 tablet by oral route daily

for 90 days                              

 

                nitroglycerin 0.4 mg sublingual tablet, sublingual 2021       give 1 

tablet sublingually every 5min. as needed for chest pain times 3 doses, if not 
relieved go to ER                        

 

                pantoprazole 40 mg oral tablet,delayed release (DR/EC) 2021

                       TAKE 1 TABLET

BY MOUTH EVERY DAY                       

 

                Protonix 40 mg oral tablet,delayed release (DR/EC) 2021       take 1 

tablet (40 mg) by oral route once daily for 90 days  

 

             allopurinol 300 mg oral tablet 2021                 TAKE 1 TAB

LET BY MOUTH ONCE DAILY  

 

                FERROUS SULFATE 325MG (5GR) TABS 2021      

 TAKE 1 TABLET BY MOUTH 

EVERY DAY                                

 

                carvedilol 25 mg oral tablet 9/3/2021        2022        noemi

e 1 tablet (25 mg) by oral 

route every 12 hours with food for 30 days  

 

                amoxicillin 875 mg oral tablet 9/3/2021        9/10/2021       t

irvin 1 tablet (875 mg) by oral

route every 12 hours for 7 days          







                                         

 

             Name         Start Date   Expiration Date SIG          Comments

 

             Biotin Oral                                          

 

                Zithromax Z-Jones 250 mg oral tablet 5/3/2010        2010     

   take 2 tablets (500 mg) by 

oral route once daily for 1 day then 1 tablet (250 mg) by oral route once daily 
for 4 days                               

 

                Coreg 12.5 mg oral tablet 2010        1BID -

 TAKE ONE TABLET BY MOUTH 

TWICE DAILY                              

 

                ALLOPURINOL 300MG    each 2011        2/3/2011       

 1QD - TAKE ONE TABLET BY 

MOUTH EVERY DAY                          

 

                Edarbyclor 40-12.5 mg oral tablet 4/15/2013       2013    

  take 1 tablet by oral 

route once daily                         

 

                carvedilol 12.5 mg oral tablet 2013       T

IRVIN ONE TABLET BY MOUTH TWICE

DAILY                                    

 

                gemfibrozil 600 mg oral tablet 2013       T

IRVIN ONE TABLET BY MOUTH TWICE

DAILY (BEFORE  MORNING  AND  EVENING  MEALS)  

 

                losartan-hydrochlorothiazide 100-25 mg oral tablet 2013    

   10/16/2013      TAKE ONE

TABLET BY MOUTH IN THE MORNING           

 

                Levaquin 500 mg oral tablet 2013      take

 1 tablet (500 mg) by oral 

route once daily for 7 days              

 

                Sporanox 100 mg oral capsule                                 noemi

e 1 capsule (100 mg) by oral route once 

daily with food                          

 

                Sunscreen SPF 30 2015                       apply q2h while

 outdoors, q1h if swimming or 

sweating                                 

 

             Vaseline     2015                 apply to treated area  

 

                urea 40 % topical cream 2015                       apply to

 the affected area(s) by topical 

route once a day                         

 

                econazole 1 % topical cream 2015                       appl

y to the affected and surrounding 

areas of skin by topical route once daily  

 

                Zetia 10 mg oral tablet                                 take 1 t

ablet (10 mg) by oral route once daily for 

30 days                                  

 

                amoxicillin 875 mg oral tablet                                 t

irvin 1 tablet (875 mg) by oral route every 12

hours for 10 days, finish 16        

 

                Nexium 40 mg oral capsule,delayed release(DR/EC) 2016      

 10/24/2016      take 1 

capsule (40 mg) by oral route once daily for 4 weeks  

 

                Keflex 500 mg oral capsule 4/3/2017        2017       take 

1 capsule (500 mg) by oral 

route every 12 hours for 14 days         

 

                Cipro 500 mg oral tablet                                 take 1 

tablet (500 mg) by oral route every 12 hours

for 7 days                               

 

                Hyzaar 100-25 mg oral tablet 2018       noemi

e 1 tablet by oral route once

daily for 30 days                        

 

                Naprosyn 500 mg oral tablet 2018       take

 1 tablet (500 mg) by oral 

route 2 times per day with food for 14 days  

 

                Cipro 250 mg oral tablet                                 take 1 

tablet (250 mg) by oral route every 12 hours

for 5 days                               

 

                Zithromax 500 mg oral tablet 3/21/2019       3/28/2019       noemi

e 1 tablet (500 mg) by oral 

route once daily for 7 days              

 

                Carafate 1 gram oral tablet 2019       take

 1 tablet by oral route 4 

times a day for 14 days                  

 

                Suprep Bowel Prep Kit 17.5-3.13-1.6 gram oral recon soln 20

19                       take as 

directed                                 

 

                Augmentin 875-125 mg oral tablet 10/11/2019      10/18/2019     

 take 1 tablet by oral 

route every 12 hours for 7 days          

 

                gemfibrozil 600 mg oral tablet 2019        T

IRVIN 1 TABLET BY MOUTH TWICE 

DAILY BEFORE MORNING AND EVENING MEALS for 30 days  







                                        Discontinued 

 

             Name         Start Date   Discontinued Date SIG          Comments

 

                Vaseretic 10-25 mg oral tablet                 3/1/2011        t

irvin 1 tablet by oral route once daily

                                         

 

                Omnaris 50 mcg nasal spray,non-aerosol 2010 

       inhale 2 sprays (100 

mcg) in each nostril by intranasal route once daily  

 

                Mobic 15 mg oral tablet 2010       3/1/2011        take 1 t

ablet (15 mg) by oral route 

once daily                               

 

                Cipro 500 mg oral tablet                 2013        take 1 

tablet (500 mg) by oral route every 

12 hours for 7 days                      

 

                amoxicillin 500 mg oral capsule                 2013        

take 1 capsule (500 mg) by oral route

3 times per day x 7days                  

 

                Levaquin 500 mg oral tablet                 2013        take

 1 tablet (500 mg) by oral route once

daily for 7 days                         

 

                hydrocortisone 2.5 % topical cream 2014    

   apply to the affected 

area(s) by topical route once daily      

 

                losartan-hydrochlorothiazide 100-25 mg oral tablet 10/13/2014   

   2015        TAKE ONE 

TABLET BY MOUTH IN THE MORNING           

 

                Hyzaar 100-25 mg oral tablet 2017        noemi

e 1 tablet by oral route once

daily for 30 days                        

 

                Diflucan 150 mg oral tablet                 2018       take

 1 tablet (150 mg) by oral route 

once                                     

 

                Macrobid 100 mg oral capsule 10/12/2017      2018       noemi

e 1 capsule (100 mg) by 

oral route every 12 hours with food for 7 days  

 

                Cipro 500 mg oral tablet 10/17/2017      2018       take 1 

tablet (500 mg) by oral 

route every 12 hours for 7 days          

 

                Iron (ferrous sulfate) 325 mg (65 mg iron) oral tablet          

       2021       take 1 tablet

(325 mg) by oral route 2 times per day   

 

                Dexilant 60 mg oral capsule,biphase delayed releas 2019       take 1 

capsule (60 mg) by oral route once daily  







Problem List





                    Description         Status              Onset

 

                    Hypertension        Active               

 

                    Gout                Active               

 

                    Heart disease       Active               

 

                    Hyperlipidemia      Active               

 

                    Arthritis unspecified Active               

 

                    Actinic Keratosis   Active              2015

 

                    Onychomycosis       Active              2015

 

                    Seborrheic keratoses Active              2015

 

                    BCC (basal cell carcinoma of skin) Active              

 

                    Keratosis           Active              2017

 

                    Dyspepsia           Active              2019

 

                    Change in bowel habit Active              2019







Vital Signs





     Date Time BP-Sys(mm[Hg] BP-Ester(mm[Hg]) HR(bpm) RR(rpm) Temp WT   HT   HC   

BMI  BSA  BMI

 Percentile                             O2 Sat(%)

 

        9/3/2021 10:48:00  mm[Hg] 70 mm[Hg] 74 {beats}/min 20 rpm  98.1 F 

 161.5 lbs

           66 in                 26.0665 kg/m2 1.847 m2              97 %

 

        2021 10:04:00  mm[Hg] 72 mm[Hg] 70 {beats}/min 18 rpm  98.6 F

  150.25 

lbs        66 in                 24.25 kg/m2 1.78 m2               98 %

 

        10/11/2019 10:55:00  mm[Hg] 60 mm[Hg] 65 {beats}/min 18 rpm  97.9 

F  141 lbs

           66 in                 22.7578 kg/m2 1.7258 m2             98 %

 

        2019 1:58:00  mm[Hg] 72 mm[Hg] 65 {beats}/min 20 rpm  97 F    

145.5 lbs 66

 in                       23.48 kg/m2  1.75 m2                    

 

        2019 8:53:00  mm[Hg] 63 mm[Hg] 70 {beats}/min 70 rpm  97.5 F 

 145.5 lbs

           66 in                 23.4841 kg/m2 1.7531 m2              

 

        2019 8:47:00  mm[Hg] 63 mm[Hg] 75 {beats}/min 20 rpm  98.8 F 

 150 lbs 

66 in                     24.21 kg/m2  1.78 m2                    

 

        2019 9:02:00  mm[Hg] 78 mm[Hg] 65 {beats}/min 18 rpm  98.4 F 

 156.375 

lbs        67 in                 24.4915 kg/m2 1.8311 m2             98 %

 

        2019 9:50:00  mm[Hg] 68 mm[Hg] 68 {beats}/min 18 rpm  97.9 F 

 156 lbs 

67 in                     24.43 kg/m2  1.83 m2                   100 %

 

        2019 9:10:00  mm[Hg] 70 mm[Hg] 65 {beats}/min 18 rpm  97.2 F 

 158.5 lbs

           67 in                 24.8244 kg/m2 1.8435 m2             97 %

 

        3/21/2019 8:53:00  mm[Hg] 72 mm[Hg] 80 {beats}/min 20 rpm  97.5 F 

 154.375 

lbs        67 in                 24.18 kg/m2 1.82 m2               96 %

 

        2019 8:54:00  mm[Hg] 72 mm[Hg] 64 {beats}/min 14 rpm  97.9 F  

156 lbs 67

 in                       24.4328 kg/m2 1.8289 m2                 98 %

 

        2018 11:03:00  mm[Hg] 70 mm[Hg] 68 {beats}/min 16 rpm  98.1 

F  163 lbs

           67 in                 25.53 kg/m2 1.87 m2               98 %

 

        2018 8:39:00  mm[Hg] 74 mm[Hg] 65 {beats}/min 18 rpm  97.7 F  

171.25 lbs

           67 in                 26.8213 kg/m2 1.9163 m2             97 %

 

        2018 9:06:00  mm[Hg] 92 mm[Hg] 62 {beats}/min 16 rpm  96.1 F 

 169 lbs 

67 in                     26.47 kg/m2  1.90 m2                   97 %

 

        10/12/2017 9:18:00  mm[Hg] 70 mm[Hg] 61 {beats}/min 14 rpm  97.3 F

  168.25 

lbs        67 in                 26.3514 kg/m2 1.8994 m2             99 %

 

        2017 8:13:00  mm[Hg] 76 mm[Hg] 69 {beats}/min 18 rpm  97.3 F  

172 lbs 67

 in                       26.94 kg/m2  1.92 m2                   98 %

 

        2017 11:06:00  mm[Hg] 98 mm[Hg] 62 {beats}/min 16 rpm  96.9 F

  178 lbs 

67 in                     27.8785 kg/m2 1.9537 m2                 9 %

 

        2017 10:35:00  mm[Hg] 88 mm[Hg] 67 {beats}/min 20 rpm  97.8 F 

 179 lbs 

67 in                     28.04 kg/m2  1.96 m2                    

 

        3/24/2017 9:50:00  mm[Hg] 88 mm[Hg] 67 {beats}/min 20 rpm  97.8 F 

 179 lbs 

67 in                     28.0351 kg/m2 1.9591 m2                  

 

        3/1/2017 9:25:00  mm[Hg] 78 mm[Hg] 62 {beats}/min 17 rpm  97.6 F  

180 lbs 67

 in                       28.19 kg/m2  1.96 m2                   97 %

 

        2016 10:56:00  mm[Hg] 88 mm[Hg] 60 {beats}/min 18 rpm  97.6 F

  179 lbs 

67 in                     28.0351 kg/m2 1.9591 m2                 96 %

 

        2016 8:24:00  mm[Hg] 70 mm[Hg] 72 {beats}/min 18 rpm  97.5 F 

 174 lbs 

67 in                     27.25 kg/m2  1.93 m2                   99 %

 

        2015 1:40:00  mm[Hg] 72 mm[Hg] 57 {beats}/min 18 rpm  98.5 F  

191.25 lbs

           67 in                 29.9537 kg/m2 2.0251 m2             96 %

 

        2014 2:11:00  mm[Hg] 64 mm[Hg] 74 {beats}/min 20 rpm  97.6 F 

 205 lbs 

67 in                     32.11 kg/m2  2.10 m2                    

 

        2014 9:28:00  mm[Hg] 80 mm[Hg] 72 {beats}/min 18 rpm  96.8 F 

 203 lbs 

67 in                     31.794 kg/m2 2.0863 m2                  

 

        10/7/2013 2:22:00  mm[Hg] 75 mm[Hg] 70 {beats}/min 18 rpm  98 F   

 206 lbs 67 

in                        32.26 kg/m2  2.10 m2                   95 %

 

        2013 9:42:00  mm[Hg] 72 mm[Hg] 68 {beats}/min 18 rpm  97.4 F  

205 lbs 67

 in                       32.1072 kg/m2 2.0966 m2                  

 

        3/15/2013 10:41:00  mm[Hg] 70 mm[Hg] 60 {beats}/min 18 rpm  97.2 F

  201 lbs 

66 in                     32.44 kg/m2  2.06 m2                    

 

        2012 1:46:00  mm[Hg] 78 mm[Hg] 60 {beats}/min 18 rpm  97.2 F

  203 lbs 

                                                                  

 

        2012 8:48:00  mm[Hg] 80 mm[Hg] 62 {beats}/min 18 rpm  97 F   

 207 lbs 66 

in                        33.41 kg/m2  2.091 m2                   

 

        3/31/2011 9:58:00  mm[Hg] 82 mm[Hg] 68 {beats}/min 20 rpm  97.2 F 

 203 lbs  

                                                                  

 

      3/1/2011 9:05:00  mm[Hg] 74 mm[Hg] 64 {beats}/min 20 rpm 97 F  203 l

bs             

                                                             

 

      2010 9:03:00  mm[Hg] 72 mm[Hg] 58 {beats}/min 20 rpm       203 

lbs                   

                                                     

 

        2010 8:40:00  mm[Hg] 88 mm[Hg] 68 {beats}/min 18 rpm  96.9 F 

 209 lbs  

                                                                  







Social History





                    Name                Description         Comments

 

                    denies alcohol use                       

 

                    Lives with spouse                        

 

                    Tobacco             Never smoker         







History of Procedures





                    Date Ordered        Description         Order Status

 

                    2016 12:00 AM  COMPLETE CBC W/AUTO DIFF WBC Reviewed

 

                    2016 12:00 AM  COMPREHEN METABOLIC PANEL Reviewed

 

                    2016 12:00 AM  LIPID PANEL         Reviewed

 

                    2016 12:00 AM  GLYCOSYLATED HEMOGLOBIN TEST Reviewed

 

                    2012 12:00 AM  COMPLETE CBC W/AUTO DIFF WBC Reviewed

 

                    2012 12:00 AM  COMPREHEN METABOLIC PANEL Reviewed

 

                    2012 12:00 AM  LIPID PANEL         Reviewed

 

                    2012 12:00 AM  GLYCOSYLATED HEMOGLOBIN TEST Reviewed

 

                    2012 12:00 AM  ASSAY THYROID STIM HORMONE Reviewed

 

                    2012 12:00 AM  ASSAY OF BLOOD/URIC ACID Reviewed

 

                    2012 12:00 AM  URINALYSIS AUTO W/SCOPE Reviewed

 

                    3/1/2017 12:00 AM   COMPLETE CBC W/AUTO DIFF WBC Reviewed

 

                    3/1/2017 12:00 AM   COMPREHEN METABOLIC PANEL Reviewed

 

                    3/1/2017 12:00 AM   LIPID PANEL         Reviewed

 

                    3/1/2017 12:00 AM   ASSAY THYROID STIM HORMONE Reviewed

 

                    3/1/2017 12:00 AM   VITAMIN B-12        Reviewed

 

                    3/24/2017 12:00 AM  CMPLX RPR F/C/C/M/N/AX/G/H/F Reviewed

 

                    2017 12:00 AM   COMPLETE CBC W/AUTO DIFF WBC Returned

 

                    2017 12:00 AM   COMPREHEN METABOLIC PANEL Returned

 

                    2017 12:00 AM   ASSAY THYROID STIM HORMONE Returned

 

                    2017 12:00 AM   VITAMIN B-12        Returned

 

                    2017 12:00 AM   ASSAY OF BLOOD/URIC ACID Returned

 

                    2017 12:00 AM   X-RAY EXAM OF HAND  Returned

 

                    2017 12:00 AM   ASSAY OF PREALBUMIN Returned

 

                    10/12/2017 12:00 AM URINE CULTURE/COLONY COUNT Reviewed

 

                    10/12/2017 9:44 AM  URINALYSIS AUTO W/O SCOPE Reviewed

 

                    2018 12:00 AM  COMPLETE CBC W/AUTO DIFF WBC Returned

 

                    2018 12:00 AM  COMPREHEN METABOLIC PANEL Returned

 

                    2018 12:00 AM  LIPID PANEL         Returned

 

                    2018 12:00 AM  GLYCOSYLATED HEMOGLOBIN TEST Returned

 

                    2018 12:00 AM  ASSAY OF BLOOD/URIC ACID Returned

 

                    2018 12:00 AM  ASSAY OF URINE/URIC ACID Returned

 

                    3/4/2013 12:00 AM   CHEST X-RAY 2VW FRONTAL&LATL Reviewed

 

                    2010 12:00 AM  COMPLETE CBC W/AUTO DIFF WBC Reviewed

 

                    2010 12:00 AM  COMPREHEN METABOLIC PANEL Reviewed

 

                    2010 12:00 AM  LIPID PANEL         Reviewed

 

                    2010 12:00 AM  ASSAY OF BLOOD/URIC ACID Reviewed

 

                    2010 12:00 AM  GLYCOSYLATED HEMOGLOBIN TEST Reviewed

 

                    2018 12:00 AM CHEST X-RAY 2VW FRONTAL&LATL Returned

 

                    2018 12:00 AM COMPLETE CBC W/AUTO DIFF WBC Returned

 

                    2018 12:00 AM COMPREHEN METABOLIC PANEL Returned

 

                    2018 12:00 AM ASSAY OF TROPONIN QUANT Returned

 

                    2013 12:00 AM   COMPLETE CBC W/AUTO DIFF WBC Reviewed

 

                    2013 12:00 AM   COMPREHEN METABOLIC PANEL Reviewed

 

                    2013 12:00 AM   GLYCOSYLATED HEMOGLOBIN TEST Reviewed

 

                    2019 12:00 AM  METABOLIC PANEL TOTAL CA Returned

 

                    2019 12:00 AM  CT ABD & PELV W/CONTRAST Returned

 

                    2019 12:00 AM   US EXAM ABDO BACK WALL COMP Returned

 

                    2019 3:40 PM   URINALYSIS AUTO W/O SCOPE Reviewed

 

                    2019 12:00 AM  COMPLETE CBC W/AUTO DIFF WBC Reviewed

 

                    2019 12:00 AM  COMPREHEN METABOLIC PANEL Reviewed

 

                    2019 12:00 AM  ELECTROCARDIOGRAM TRACING Reviewed

 

                    2019 12:00 AM  ASSAY OF TROPONIN QUANT Reviewed

 

                    2019 12:00 AM  FECES CULTURE AEROBIC BACT Reviewed

 

                    2019 12:00 AM  C DIFF AMPLIFIED PROBE Reviewed

 

                    2019 12:00 AM  Tick Panel          Reviewed

 

                    2019 12:00 AM  METABOLIC PANEL TOTAL CA Returned

 

                    2019 12:00 AM  COMPLETE CBC W/AUTO DIFF WBC Returned

 

                    2019 12:00 AM  VITAMIN B-12        Returned

 

                    2019 12:00 AM  ASSAY OF IRON       Returned

 

                    6/10/2019 12:00 AM  ECHO EXAM OF ABDOMEN Reviewed

 

                    6/10/2019 12:00 AM  HEPATOBILIARY SYSTEM IMAGING Reviewed

 

                    2019 12:00 AM  METABOLIC PANEL TOTAL CA Returned

 

                    2019 12:00 AM  ASSAY OF IRON       Returned

 

                    10/11/2019 12:00 AM CT THORAX W/DYE     Returned

 

                    10/14/2019 12:00 AM METABOLIC PANEL TOTAL CA Returned

 

                    2020 12:00 AM   COMPLETE CBC W/AUTO DIFF WBC Returned

 

                    2020 12:00 AM   COMPREHEN METABOLIC PANEL Returned

 

                    2020 12:00 AM   LIPID PANEL         Returned

 

                    2020 12:00 AM   CT THORAX W/DYE     Returned

 

                    2021 12:00 AM  COMPLETE CBC W/AUTO DIFF WBC Returned

 

                    2021 12:00 AM  COMPREHEN METABOLIC PANEL Returned

 

                    2021 12:00 AM  ASSAY THYROID STIM HORMONE Returned

 

                    2021 12:00 AM  ASSAY OF TROPONIN QUANT Returned

 

                    2021 12:00 AM  ASSAY OF BLOOD/URIC ACID Returned

 

                    2021 12:00 AM  ASSAY OF IRON       Returned

 

                    2021 12:00 AM  IRON BINDING TEST   Returned

 

                    2021 12:00 AM  ASSAY OF TRANSFERRIN Returned

 

                    2021 12:00 AM  VITAMIN B-12        Returned

 

                    2021 12:00 AM  COMPLETE CBC W/AUTO DIFF WBC Returned

 

                    2021 12:00 AM  METABOLIC PANEL TOTAL CA Returned

 

                    9/3/2021 12:00 AM   COMPLETE CBC W/AUTO DIFF WBC Returned

 

                    9/3/2021 12:00 AM   COMPREHEN METABOLIC PANEL Returned

 

                    9/3/2021 12:00 AM   ASSAY OF NATRIURETIC PEPTIDE Returned

 

                    9/3/2021 12:00 AM   ASSAY THYROID STIM HORMONE Returned

 

                    9/3/2021 12:00 AM   ASSAY OF BLOOD/URIC ACID Returned

 

                    9/3/2021 12:00 AM   ASSAY OF MAGNESIUM  Returned

 

                    9/3/2021 12:00 AM   ASSAY OF IRON       Returned

 

                    9/3/2021 12:00 AM   IRON BINDING TEST   Returned

 

                    9/3/2021 12:00 AM   ASSAY OF TRANSFERRIN Returned

 

                    9/3/2021 12:00 AM   VITAMIN B-12        Returned

 

                    2/15/2011 12:00 AM  ROUTINE VENIPUNCTURE Reviewed

 

                    2/15/2011 12:00 AM  COMPREHEN METABOLIC PANEL Reviewed

 

                    2/15/2011 12:00 AM  LIPID PANEL         Reviewed

 

                    2/15/2011 12:00 AM  ASSAY OF BLOOD/URIC ACID Reviewed

 

                    2010 12:00 AM  TTE W/O DOPPLER COMPLETE Reviewed

 

                    2015 12:00 AM  DESTRUCT PREMALG LESION Reviewed

 

                    2015 12:00 AM   COMPLETE CBC W/AUTO DIFF WBC Reviewed

 

                    2015 12:00 AM   COMPREHEN METABOLIC PANEL Reviewed

 

                    2015 12:00 AM   LIPID PANEL         Reviewed

 

                    2015 12:00 AM   GLYCOSYLATED HEMOGLOBIN TEST Reviewed

 

                    2015 12:00 AM   ASSAY OF BLOOD/URIC ACID Reviewed







Results Summary





                          Date and Description      Results

 

                          2010 9:45 AM         GLYCOHEMOGLOBIN A1C 6.30 %UR

IC ACID 5.3 mg/dLTRIGLYCERIDES 

90.0 mg/dLCHOLESTEROL 209.0 mg/dLHDL 43.0 mg/dLTOT CHOL/HDL 4.9 LDL (CALC) 148.0
 mg/dLWBC 10.0 RBC 4.75 HGB 14.10 g/dLHCT 41.0 %MCV 86.0 fLMCH 29.70 pgMCHC 
34.40 g/dLRDW SD 45 RDW CV 14.50 %MPV 10.40 fLPLT 354 NRBC# 0.00 NRBC% 0.0 %NEUT
 63.60 %%LYMP 21.60 %%MONO 5.90 %%EOS 8.30 %%BASO 0.60 %#NEUT 6.35 #LYMP 2.16 
#MONO 0.59 #EOS 0.83 #BASO 0.06 MANUAL DIFF NOT IND GLUCOSE 112.0 mg/dLSODIUM 
142.0 mmol/LPOTASSIUM 3.90 mmol/LCHLORIDE 103.0 mmol/LCO2 28.0 mmol/LBUN 20.0 
mg/dLCREATININE 0.90 mg/dLSGOT/AST 23.0 IU/LSGPT/ALT 13.0 IU/LALK PHOS 75.0 
IU/LTOTAL PROTEIN 6.90 g/dLALBUMIN 4.30 g/dLTOTAL BILI 0.50 mg/dLCALCIUM 9.0 
mg/dLAGE 70 GFR NonAA 62 GFR AA 75 eGFR >60 mL/min/1.73 m2eGFR AA* >60 

 

                          2/15/2011 9:15 AM         TRIGLYCERIDES 97.0 mg/dLCHOL

ESTEROL 212.0 mg/dLHDL 48.0 

mg/dLTOT CHOL/HDL 4.4 LDL (CALC) 145.0 mg/dLURIC ACID 5.3 mg/dLGLUCOSE 110.0 
mg/dLSODIUM 141.0 mmol/LPOTASSIUM 4.10 mmol/LCHLORIDE 102.0 mmol/LCO2 29.0 
mmol/LBUN 18.0 mg/dLCREATININE 0.90 mg/dLSGOT/AST 31.0 IU/LSGPT/ALT 17.0 IU/LALK
 PHOS 74.0 IU/LTOTAL PROTEIN 7.60 g/dLALBUMIN 4.30 g/dLTOTAL BILI 0.30 
mg/dLCALCIUM 10.20 mg/dLAGE 71 GFR NonAA 62 GFR AA 75 eGFR >60 mL/min/1.73 
m2eGFR AA* >60 

 

                          2012 9:55 AM         WBC 8.7 RBC 4.63 HGB 13.40 g

/dLHCT 40.70 %MCV 88.0 fLMCH 28.90

 pgMCHC 32.90 g/dLRDW SD 47 RDW CV 14.70 %MPV 10.20 fLPLT 388 NRBC# 0.00 NRBC% 
0.0 %NEUT 59.90 %%LYMP 22.40 %%MONO 5.60 %%EOS 11.50 %%BASO 0.60 %#NEUT 5.20 
#LYMP 1.94 #MONO 0.49 #EOS 1.00 #BASO 0.05 MANUAL DIFF NOT IND GLUCOSE 114.0 
mg/dLSODIUM 142.0 mmol/LPOTASSIUM 3.80 mmol/LCHLORIDE 104.0 mmol/LCO2 28.0 
mmol/LBUN 23.0 mg/dLCREATININE 0.90 mg/dLSGOT/AST 20.0 IU/LSGPT/ALT 15.0 IU/LALK
 PHOS 69.0 IU/LTOTAL PROTEIN 7.20 g/dLALBUMIN 4.30 g/dLTOTAL BILI 0.30 
mg/dLCALCIUM 9.90 mg/dLAGE 72 GFR NonAA 62 GFR AA 75 eGFR >60 mL/min/1.73 m2eGFR
 AA* >60 TRIGLYCERIDES 94.0 mg/dLCHOLESTEROL 217.0 mg/dLHDL 43.0 mg/dLTOT 
CHOL/HDL 5.0 LDL (CALC) 155.0 mg/dLURIC ACID 5.6 mg/dLTSH 1.920 
uIU/mLGLYCOHEMOGLOBIN A1C 6.30 %

 

                          2012 2:42 PM         COLOR YELLOW APPEARANCE HAZY

 SPEC GRAV 1.025 pH 5.5 PROTEIN 

NEGATIVE GLUCOSE NEGATIVE KETONE NEGATIVE BILIRUBIN NEGATIVE BLOOD TRACE-INTACT 
NITRITE NEGATIVE LEUK SCREEN MODERATE WBC/HPF  RBC/HPF 0-5 CASTS/LPF 
NEGATIVE CRYSTALS NEGATIVE MUCOUS THRDS NEGATIVE BACTERIA 1+ EPITH CELLS 1+ 
RENAL TRICHOMONAS NEGATIVE YEAST NEGATIVE CULT SET UP? YES 

 

                          2013 10:48 AM         WBC 10.3 RBC 4.50 HGB 13.30 

g/dLHCT 39.10 %MCV 87.0 fLMCH 

29.60 pgMCHC 34.0 g/dLRDW SD 46 RDW CV 14.60 %MPV 9.80 fLPLT 354 NRBC# 0.00 
NRBC% 0.0 %NEUT 63.60 %%LYMP 23.40 %%MONO 4.90 %%EOS 7.70 %%BASO 0.40 %#NEUT 
6.56 #LYMP 2.42 #MONO 0.51 #EOS 0.79 #BASO 0.04 MANUAL DIFF NOT IND HGB A1C 6.20
 %Est Avg Glucose 131.2 mg/dLGLUCOSE 90.0 mg/dLSODIUM 142.0 mmol/LPOTASSIUM 3.80
 mmol/LCHLORIDE 102.0 mmol/LCO2 29.0 mmol/LBUN 19.0 mg/dLCREATININE 0.90 
mg/dLSGOT/AST 24.0 IU/LSGPT/ALT 17.0 IU/LALK PHOS 77.0 IU/LTOTAL PROTEIN 7.30 
g/dLALBUMIN 4.20 g/dLTOTAL BILI 0.40 mg/dLCALCIUM 10.60 mg/dLAGE 73 GFR NonAA 62
 GFR AA 75 eGFR 60 eGFR AA* 60 

 

                          4/10/2015 9:58 AM         WBC 8.5 RBC 4.40 HGB 12.90 g

/dLHCT 39.40 %MCV 90.0 fLMCH 29.30

 pgMCHC 32.70 g/dLRDW SD 47 RDW CV 14.40 %MPV 9.90 fLPLT 393 NRBC# 0.00 NRBC% 
0.0 %NEUT 56.0 %%LYMP 25.60 %%MONO 5.40 %%EOS 12.50 %%BASO 0.50 %#NEUT 4.75 
#LYMP 2.17 #MONO 0.46 #EOS 1.06 #BASO 0.04 MANUAL DIFF NOT IND GLUCOSE 123.0 
mg/dLSODIUM 142.0 mmol/LPOTASSIUM 4.10 mmol/LCHLORIDE 105.0 mmol/LCO2 26.0 
mmol/LBUN 22.0 mg/dLCREATININE 1.10 mg/dLSGOT/AST 20.0 IU/LSGPT/ALT 15.0 IU/LALK
 PHOS 94.0 IU/LTOTAL PROTEIN 7.70 g/dLALBUMIN 4.20 g/dLTOTAL BILI 0.30 
mg/dLCALCIUM 10.50 mg/dLAGE 75 GFR NonAA 49 GFR AA 59 eGFR 49 eGFR AA* 59 
TRIGLYCERIDES 111.0 mg/dLCHOLESTEROL 201.0 mg/dLHDL 35.0 mg/dLTOT CHOL/HDL 5.7 
LDL (CALC) 144.0 mg/dLURIC ACID 4.7 mg/dLHGB A1C 6.30 %Est Avg Glucose 134.1 
mg/dL

 

                          2016 8:47 AM         WBC 7.8 RBC 4.67 HGB 13.50 g

/dLHCT 40.90 %MCV 88.0 fLMCH 28.90

 pgMCHC 33.0 g/dLRDW SD 46 RDW CV 14.50 %MPV 10.30 fLPLT 360 NRBC# 0.00 NRBC% 
0.0 %NEUT 59.50 %%LYMP 25.90 %%MONO 6.10 %%EOS 7.90 %%BASO 0.60 %#NEUT 4.65 
#LYMP 2.03 #MONO 0.48 #EOS 0.62 #BASO 0.05 MANUAL DIFF NOT IND GLUCOSE 115.0 
mg/dLSODIUM 144.0 mmol/LPOTASSIUM 3.90 mmol/LCHLORIDE 99.0 mmol/LCO2 30.0 
mmol/LBUN 30.0 mg/dLCREATININE 1.30 mg/dLSGOT/AST 19.0 IU/LSGPT/ALT 14.0 IU/LALK
 PHOS 96.0 IU/LTOTAL PROTEIN 6.90 g/dLALBUMIN 4.40 g/dLTOTAL BILI 0.40 
mg/dLCALCIUM 10.80 mg/dLAGE 76 GFR NonAA 40 GFR AA 48 eGFR 40 eGFR AA* 48 
TRIGLYCERIDES 127.0 mg/dLCHOLESTEROL 204.0 mg/dLHDL 42.0 mg/dLTOT CHOL/HDL 4.9 
LDL (CALC) 137.0 mg/dLHemoglobin A1c 6.30 %Estim. Avg Glu (eAG) 134 

 

                          3/1/2017 9:48 AM          GLUCOSE 101.0 mg/dLSODIUM 14

3.0 mmol/LPOTASSIUM 4.10 

mmol/LCHLORIDE 108.0 mmol/LCO2 25.0 mmol/LBUN 20.0 mg/dLCREATININE 1.20 
mg/dLSGOT/AST 19.0 IU/LSGPT/ALT 10.0 IU/LALK PHOS 96.0 IU/LTOTAL PROTEIN 7.60 
g/dLALBUMIN 4.0 g/dLTOTAL BILI 0.30 mg/dLCALCIUM 10.0 mg/dLAGE 77 GFR NonAA 44 
GFR AA 53 eGFR 44 eGFR AA* 53 TRIGLYCERIDES 61.0 mg/dLCHOLESTEROL 190.0 mg/dLHDL
 40.0 mg/dLTOT CHOL/HDL 4.8 LDL (CALC) 138.0 mg/dLWBC 10.8 RBC 4.50 HGB 13.10 
g/dLHCT 38.90 %MCV 86.0 fLMCH 29.10 pgMCHC 33.70 g/dLRDW SD 45 RDW CV 14.40 %MPV
 9.70 fLPLT 341 NRBC# 0.00 NRBC% 0.0 %NEUT 66.20 %%LYMP 15.80 %%MONO 4.80 %%EOS 
12.30 %%BASO 0.50 %#NEUT 7.19 #LYMP 1.71 #MONO 0.52 #EOS 1.33 #BASO 0.05 MANUAL 
DIFF NOT IND TSH 1.950 uIU/mLVITAMIN B12 331.0 pg/mL

 

                          10/12/2017 9:44 AM        Color Ur lt. yellow Glucose 

Ur-sCnc neg Bilirub Ur Ql Strip 

neg Ketones Ur Ql Strip neg Sp Gr Ur Qn 1.015 Hgb Ur Ql Strip neg pH Ur-LsCnc 
5.5 Prot Ur Ql Strip trace Urobilinogen Ur-mCnc 0.2eu/dl Nitrite Ur Ql Strip neg
 WBC Est Ur Ql Strip large 

 

                          2018 2:05 PM        Falls in last 6 months? No U

nsteady or worry about falling? 

Yes Fall Risk Assessment At Risk 

 

                          2019 10:44 AM        WBC 8.0 RBC 4.11 HGB 10.70 g

/dLHCT 34.60 %MCV 84.0 fLMCH 26.0

 pgMCHC 30.90 g/dLRDW SD 48 fLRDW CV 15.70 %MPV 9.90 fLPLT 517 NRBC# 0.00 NRBC% 
0.0 %NEUT 75.1 %LYMP 14.4 %MONO 5.9 %EOS 3.9 %BASO 0.4 #NEUT 6.02 #LYMP 1.15 
#MONO 0.47 #EOS 0.31 #BASO 0.03 MANUAL DIFF NOT IND GLUCOSE 101 SODIUM 142 
POTASSIUM 3.9 CHLORIDE 107.0 mmol/LCO2 27 BUN 18.0 mg/dLCREATININE 1.040 
mg/dLSGOT/AST 13 SGPT/ALT 6 ALK PHOS 111 TOTAL PROTEIN 7.5 ALBUMIN 3.5 TOTAL 
BILI 0.2 CALCIUM 10.20 mg/dLAGE 79 GFR NonAA 51 GFR AA 62 eGFR 51 eGFR AA* >60 
mL/min/1.73 [id number] AD 0.06 TOXIGENIC C DIFF NEGATIVE 027 NAP1 STRAIN 
PRESUMPTIVE NEGATIVE Lyme IgG/IgM Ab <0.91 Lyme Disease Ab, Quant,IgM WILL 
FOLLOW RMSF, IgG, EIA WILL FOLLOW [facility] Mtn Spotted Fever,IgM WILL FOLLOW E. 
chaffeensis (HME) IgGTiter WILL FOLLOW E. chaffeensis ([facility]) IgMTiter WILL FOLLOW
 

 

                          2019 3:40 PM         Color Ur lt. yellow Glucose 

Ur-sCnc neg Bilirub Ur Ql Strip 

neg Ketones Ur Ql Strip neg Sp Gr Ur Qn 1.010 Hgb Ur Ql Strip neg pH Ur-LsCnc 
5.0 Prot Ur Ql Strip 100mg/dl Urobilinogen Ur-mCnc 0.2eu/dl Nitrite Ur Ql Strip 
neg WBC Est Ur Ql Strip neg 







History Of Immunizations





      Name  Date Admin Mfg Name Mfg Code Trade Name Lot#  Route Inj   Vis Given 

Vis Pub 

CVX

 

        Pneumococcal 10/1/2019 Not Entered NE      PREVNAR [id number]  Intramuscu

lar Left Arm 

10/1/2019                 1                  133

 

        Influenza 10/1/2019 Not Entered NE      FLUZONE-HIGH DOSE [id number] Intram

uscular Left 

Arm                 10/1/2019           1            135

 

        Influenza 2020 Not Entered NE      FLUZONE-HIGH DOSE         Not Ent

ered Not Entered 

1                  135







History of Past Illness





                    Name                Date of Onset       Comments

 

                    Hypertension        2010  8:42AM  

 

                    Hyperlipidemia, unspecified 2010  8:42AM  

 

                    Arthritis unspecified                      

 

                    Heart disease                            

 

                    Hypertension                             

 

                    Hyperlipidemia                           

 

                    Gout                                     

 

                    Hypertension        2010  9:09AM  

 

                    Gout                2010  9:09AM  

 

                    Osteoarthritis      2010  9:09AM  

 

                    Hyperglycemia       2010  9:09AM  

 

                    Onychodystrophy                          

 

                    Contact Dermatitis                       

 

                    Actinic Keratosis   2015           

 

                    Onychomycosis       2015           

 

                    Seborrheic keratoses 2015           

 

                    Coronary Artery Disease Feb 15 2011  9:03AM  

 

                    Hypertension        Feb 15 2011  9:03AM  

 

                    Hyperlipidemia      Feb 15 2011  9:03AM  

 

                    Gout                Feb 15 2011  9:03AM  

 

                    Hypertension        Mar  1 2011  9:06AM  

 

                    Hyperlipidemia, unspecified Mar  1 2011  9:06AM  

 

                    Gout                Mar  1 2011  9:06AM  

 

                    Hypertension        Mar 31 2011 10:01AM  

 

                    Headache            Mar 31 2011 10:01AM  

 

                    BCC (basal cell carcinoma of skin) 2017           

 

                    Keratosis           2017           

 

                    Dyspepsia           2019           

 

                    Change in bowel habit 2019           

 

                    Hypertension        2012  8:54AM  

 

                    Hyperlipidemia, unspecified 2012  8:54AM  

 

                    Diabetes Mellitus, Type II 2012  8:54AM  

 

                    Gout                2012  8:54AM  

 

                    Night sweats        2012  8:54AM  

 

                    Dysuria             May 21 2012  2:12PM  

 

                    Chest Pain          Dec 13 2012  1:51PM  

 

                    Cough               Mar  4 2013 11:11AM  

 

                    Hypertension        Mar 15 2013 10:45AM  

 

                    Hypertension        Aug  9 2013  9:47AM  

 

                    Hyperglycemia       Aug  9 2013  9:47AM  

 

                    Onychomycosis of Fingernail Oct  7 2013  2:24PM  

 

                    Onychomycosis of Fingernail Oct  8 2013  9:23AM  

 

                    Onychomycosis of Fingernail 2014  9:35AM  

 

                    Atopic Dermatitis   2014  9:35AM  

 

                    Lung Nodule         2014  9:35AM  

 

                    Hypertension        2014  9:35AM  

 

                    Hypertension        2014  2:16PM  

 

                    Onychomycosis of Fingernail 2014  2:16PM  

 

                    Gout                2014  2:16PM  

 

                    Actinic keratosis   2015  2:14PM  

 

                    Onychomycosis       2015  2:14PM  

 

                    Seborrheic keratoses 2015  2:14PM  

 

                    Hypertension        2015  1:45PM  

 

                    Gout                2015  1:45PM  

 

                    Hyperglycemia       2015  1:45PM  

 

                    Hyperlipidemia, unspecified 2016  8:25AM  

 

                    Hyperglycemia       2016  8:25AM  

 

                    Esophageal Reflux   Sep 26 2016 11:00AM  

 

                    Hyperlipidemia, unspecified Mar  1 2017  9:27AM  

 

                    Fatigue             Mar  1 2017  9:27AM  

 

                    BCC (basal cell carcinoma of skin) Mar 24 2017 10:21AM  

 

                    BCC (basal cell carcinoma of skin) Mar 24 2017 10:21AM  

 

                    Keratosis           Mar 24 2017 10:21AM  

 

                    Hypertension        2017 11:14AM  

 

                    Hypertension        2017  8:16AM  

 

                    Gout                2017  8:16AM  

 

                    Fatigue             2017  8:16AM  

 

                    Weight loss         2017  8:16AM  

 

                    Acute cystitis without hematuria Oct 12 2017  9:21AM  

 

                    Hypertension        2018  9:14AM  

 

                    Hyperglycemia       2018  9:14AM  

 

                    Gout                2018  9:14AM  

 

                    Hypertension        May  4 2018  8:41AM  

 

                    TMJ syndrome        May  4 2018  8:41AM  

 

                    Chest pain          2018 11:10AM  

 

                    Dyspnea             2018 11:10AM  

 

                    Risk for falls      Dec 18 2018  2:05PM  

 

                    Gastritis           2019  9:00AM  

 

                    Upper Respiratory Infection Mar 21 2019  8:56AM  

 

                    IBS (irritable bowel syndrome) 2019  9:13AM  

 

                    Irritable bowel syndrome (IBS) 2019  3:27PM  

 

                    Blood tests prior to treatment or procedure 2019  3:2

7PM  

 

                    Renal cyst          May  8 2019 12:28PM  

 

                    Shortness Of Breath May 16 2019  9:52AM  

 

                    Chest pain          May 16 2019  9:52AM  

 

                    Diarrhea            May 16 2019  9:52AM  

 

                    GERD without esophagitis May 16 2019  9:52AM  

 

                                        Bitten or stung by nonvenomous insect an

d other nonvenomous arthropods, initial 

encounter                 May 16 2019  9:52AM        

 

                    Hypertension        May 21 2019  9:06AM  

 

                    Anemia              May 21 2019  9:06AM  

 

                    Gastritis           May 21 2019  9:06AM  

 

                    Change in bowel habit May 28 2019  8:40AM  

 

                    Dyspepsia           May 28 2019  8:40AM  

 

                    Abdominal Pain      [place] 10 2019 12:24PM  

 

                    Dyspepsia           [place] 10 2019 12:24PM  

 

                    Anemia, Iron Deficiency 2019 11:09AM  

 

                    Hypokalemia         2019 11:09AM  

 

                    Dyspepsia           2019  2:17PM  

 

                    Diarrhea            2019  2:17PM  

 

                    Biliary dyskinesia  2019  2:17PM  

 

                    Basal cell carcinoma Aug  7 2019  1:58PM  

 

                    Sebaceous cyst      Aug  7 2019  1:58PM  

 

                    Basal cell carcinoma of skin, site unspecified Aug  7 2019  

1:59PM  

 

                    Dyspnea             Oct 11 2019 10:58AM  

 

                    Chest pain          Oct 11 2019 10:58AM  

 

                    Blood tests prior to treatment or procedure Oct 14 2019 12:1

3PM  

 

                    Coronary Artery Disease 2020  3:09PM  

 

                    Hypertension        2020  3:09PM  

 

                    Hyperlipidemia      2020  3:09PM  

 

                    Pneumonia           2020  3:12PM  

 

                    Abnormal CT scan, chest 2020  3:12PM  

 

                    Anemia              2021 10:51AM  

 

                    Fatigue             2021 10:51AM  

 

                    Hypertension        2021 10:03AM  

 

                    Chest pain          2021 10:03AM  

 

                    CAD (coronary artery disease) 2021 10:03AM  

 

                    Gout                2021 10:03AM  

 

                    Anemia, Iron Deficiency 2021  3:28PM  

 

                    Anemia              Sep  3 2021 11:24AM  

 

                    Fatigue             Sep  3 2021 11:24AM  

 

                    CAD (coronary artery disease) Sep  3 2021 11:24AM  

 

                    Dyspnea             Sep  3 2021 11:24AM  

 

                    Cellulitis          Sep  3 2021 10:48AM  

 

                    Chronic Obstructive Pulmonary Disease Sep  3 2021 10:48AM  

 

                    PAD (peripheral artery disease) Sep  3 2021 10:48AM  

 

                    Anemia              Sep  7 2021  4:10PM  







Payers





           Insurance Name Company Name Plan Name  Plan Number Policy Number Antony

cy Group 

Number                                  Start Date

 

                    Medicare RHC Medicare RHC           [id number]           N/

A

 

                    AARP      AARP                [id number]           N/A

 

                    Railroad Medicare Railroad Medicare           [id number]   

        

 

                    BCBS      Bcbs [facility]           [id number]           N/

A

 

                    Railroad Medicare - Railroad Railroad Medicare           [id number]

[id number]           N/A

 

                    Medicare Part A Medicare Part A           [id number]       

    N/A

 

                    Medicare Part A Medicare - Lab/Xray           [id number]   

        N/A

 

                    Medicare RHC Medicare RHC           [id number]           N/

A

 

                    Medicare Part B Medicare [place]           [id number]    

       N/A







History of Encounters





                    Visit Date          Visit Type          Provider

 

                    9/3/2021            Office visit        Joe Conley MD

 

                    2021           Office visit        Joe Conley MD

 

                    10/11/2019          Office visit        Joe Conley MD

 

                    2019           Procedures          Mike Mantilla DO

 

                    2019            Procedures          Mike Mantilla DO

 

                    2019           Surgery             Mike Mantilla DO

 

                    2019           [facility]            CHERISE Castillo MD

 

                    2019           Surgery             Mike Mantilla DO

 

                    6/10/2019           Surgery             Mike Mantilla DO

 

                    2019           Office visit        Mike Mantilla DO

 

                    2019           Office visit        Joe Conley MD

 

                    2019           Office visit        Ailyn BEARD

 

                    2019           Office visit        Joe Conley MD

 

                    3/21/2019           Office visit        Joe Conley MD

 

                    2019            Office visit        Joe Conley MD

 

                    2019            [facility]            CHERISE Castillo MD

 

                    2018          Office visit        Joe Conley MD

 

                    2018            Office visit        Joe Conley MD

 

                    2018           Office visit        Joe Conley MD

 

                    10/12/2017          Office visit        Ailyn BEARD

 

                    2017            Office visit        Joe Conley MD

 

                    2017           Office visit        Joe Conley MD

 

                    2017            Procedures          Mike Mantilla DO

 

                    3/24/2017           Procedures          Mike Mantilla DO

 

                    3/1/2017            Office visit        Joe Conley MD

 

                    2016           Office visit        Joe Conley MD

 

                    2016           Office visit        Joe Conley MD

 

                    2015            Office visit        Joe Conley MD

 

                    2015           Office visit        ADIS SAGASTUME

 

                    2014           Office visit        ADIS SAGASTUME

 

                    2014           Office visit        Joe Conley MD

 

                    2014           Office visit        Joe Conley MD

 

                    10/7/2013           Office visit        Joe Conley MD

 

                    2013            Office visit        Joe Conley MD

 

                    3/15/2013           Office visit        Joe Conley MD

 

                    2012          Office visit        Joe Conley MD

 

                    2012           Office visit        Joe Conley MD

 

                    3/31/2011           Office visit        Joe Conley MD

 

                    3/1/2011            Office visit        Joe Conley MD

 

                    2010           Laboratory          Ramy Raines MD

 

                    2010           Office visit        Joe Conley MD

 

                    2010           Office visit        Joe oCnley MD

 

                    10/5/2009           Office visit        Joe Conley MD

## 2021-10-05 NOTE — DISCHARGE INST-POST CATH
Discharge Inst-CATH/EP


Post Cardiac Cath/EP D/C Inst


Follow Up/Plan


F/u with Dr Maya in 1 week








ACTIVITY





* Go Home directly and rest.


* Limit activity of the leg (or wrist if it was used) for 7 days including 

aerobics, swimming,


   jogging, bicycling, etc.


* Restrict stair-climbing for 7 days if possible, if not, climb up with your no

n-cath leg, then


   bring together on the same step.


* Avoid lifting, pushing, pulling or excessive movement of the affected ext

remity for 7 days.


* Customary sexual activity may be resumed after 2 days-use caution not to use a

position  


   that strains or causes pain to the affected extremity.


* No driving for 24 hours.


* NO SMOKING. 


* Avoid straining for bowel movements for 7 days.


* Gentle walking on level ground is allowed.


* Returning to work will depend on the type of procedure and the results. Your 

doctor will discuss


   this with you.





CALL YOUR DOCTOR FOR ANY OF THE FOLLOWING:





*If bleeding from the puncture site occurs- Apply gentle pressure to site with 

clean cloth and call


   your doctor or EMS.


* If a knot or lump forms under the skin, increases in size, or causes pain.


* If bruising appears to be worsening or moving further down your leg instead of

disappearing.


* Temperature above 101 F.





CARE OF YOUR GROIN INCISION;





* Bruising or purple discoloration of the skin near the puncture site is common.


* You may shower only, no bathtub bathing for 5 days.  Be careful to avoid 

slipping as your


   leg may feel stiff.


* If a closure device was used on your femoral artery, please see the attached 

guide regarding


   care of the device and your leg.


* Leave dressing on FOR 24 hours.





CARE OF YOUR WRIST INCISION;





* Bruising or purple discoloration of the skin near the puncture site is common.


* You may shower.


* DO NOT submerge wrist.


* Leave dressing on FOR 24 hours.











EZEKIEL MAYA MD [facility]    Oct 5, 2021 09:45

## 2021-10-05 NOTE — XMS REPORT
MU2 Ambulatory Summary

                             Created on: 2021



Laury Og

External Reference #: [id number]

: 1940

Sex: Female



Demographics





                          Address                   [address]

[place]  [address]

 

                          Home Phone                [phone]

 

                          Preferred Language        English

 

                          Marital Status            

 

                          Yarsani Affiliation     Unknown

 

                          Race                      White

 

                          Ethnic Group              Not  or 





Author





                          Author                    Laury Lopez

 

                          Organization              [facility] 

[facility]

 

                          Address                   [address]

[place], [place]  [id number]



 

                          Phone                     [phone]







Care Team Providers





                    Care Team Member Name Role                Phone

 

                    Vijaya Lopez   PCP                 [phone]

 

                    Joe Conley     PreferredProvider   [phone]







Allergies and Adverse Reactions





                    Name                Reaction            Notes

 

                    erythromycin        vomiting             

 

                    STATINS: HMG-COA REDUCTASE INHIBITORS joint pain           

 

                    BANANAS             severe stomache pain  

 

                    Demerol             vomiting             







Plan of Treatment





             Planned Activity Comments     Planned Date Planned Time Plan/Goal

 

             gastritis and anemia, needing endsocpy                             

            

 

             EKG.                      2018   12:00 AM      

 

             CBC W/ AUTO DIFF (RFLX MAN DIFF IF IND).              9/10/2021    

12:00 AM      







Medications





                                        Active 

 

             Name         Start Date   Estimated Completion Date SIG          Co

mments

 

             aspirin 81 mg oral tablet                           take 1 tablet b

y oral route daily  

 

             biotin 5 mg oral capsule                           take 1 capsule b

y oral route daily  

 

                vitamin B12-folic acid 500-400 mcg oral tablet                  

               take 1 tablet by oral route 

daily                                    

 

                ibuprofen 800 mg oral tablet                                 noemi

e 1 tablet (800 mg) by oral route 3 times 

per day with food                        

 

                potassium chloride 20 mEq oral tablet extended release 2019

                       take 1 tablet

(20 meq) by oral route 2 times per day with food  

 

                Lomotil 2.5-0.025 mg oral tablet                                

 take 2 tablets (5 mg) by oral route once 

daily as needed                          

 

                Levaquin 500 mg oral tablet                                 take

 1 tablet (500 mg) by oral route once daily 

for 7 days                               

 

                hydrochlorothiazide 25 mg oral tablet 2019                

      TAKE 1 TABLET BY MOUTH EVERY 

DAY                                      

 

                HYDROCHLOROTHIAZIDE 25MG TABLETS 12/10/2020      2021      

 TAKE 1 TABLET BY MOUTH 

EVERY DAY                                

 

                clopidogrel 75 mg oral tablet                                 ta

ke 1 tablet (75 mg) by oral route once daily

for 30 days                              

 

                Iron (ferrous sulfate) 325 mg (65 mg iron) oral tablet 2021       take 

1 tablet (325 mg) by oral route once daily for 90 days  

 

                isosorbide mononitrate 30 mg oral tablet extended release 24 hr 

                                take 1 

tablet (30 mg) by oral route once daily in the morning for 90 days  

 

                melatonin 3 mg oral tablet 2021       take 

1 tablet by oral route daily

for 90 days                              

 

                nitroglycerin 0.4 mg sublingual tablet, sublingual 2021       give 1 

tablet sublingually every 5min. as needed for chest pain times 3 doses, if not 
relieved go to ER                        

 

                pantoprazole 40 mg oral tablet,delayed release (DR/EC) 2021

                       TAKE 1 TABLET

BY MOUTH EVERY DAY                       

 

                Protonix 40 mg oral tablet,delayed release (DR/EC) 2021       take 1 

tablet (40 mg) by oral route once daily for 90 days  

 

             allopurinol 300 mg oral tablet 2021                 TAKE 1 TAB

LET BY MOUTH ONCE DAILY  

 

                FERROUS SULFATE 325MG (5GR) TABS 2021      

 TAKE 1 TABLET BY MOUTH 

EVERY DAY                                

 

                carvedilol 25 mg oral tablet 9/3/2021        2022        noemi

e 1 tablet (25 mg) by oral 

route every 12 hours with food for 30 days  







                                         

 

             Name         Start Date   Expiration Date SIG          Comments

 

             Biotin Oral                                          

 

                Zithromax Z-Jones 250 mg oral tablet 5/3/2010        2010     

   take 2 tablets (500 mg) by 

oral route once daily for 1 day then 1 tablet (250 mg) by oral route once daily 
for 4 days                               

 

                Coreg 12.5 mg oral tablet 2010        1BID -

 TAKE ONE TABLET BY MOUTH 

TWICE DAILY                              

 

                ALLOPURINOL 300MG    each 2011        2/3/2011       

 1QD - TAKE ONE TABLET BY 

MOUTH EVERY DAY                          

 

                Edarbyclor 40-12.5 mg oral tablet 4/15/2013       2013    

  take 1 tablet by oral 

route once daily                         

 

                carvedilol 12.5 mg oral tablet 2013       T

IRVIN ONE TABLET BY MOUTH TWICE

DAILY                                    

 

                gemfibrozil 600 mg oral tablet 2013       T

IRVIN ONE TABLET BY MOUTH TWICE

DAILY (BEFORE  MORNING  AND  EVENING  MEALS)  

 

                losartan-hydrochlorothiazide 100-25 mg oral tablet 2013    

   10/16/2013      TAKE ONE

TABLET BY MOUTH IN THE MORNING           

 

                Levaquin 500 mg oral tablet 2013      take

 1 tablet (500 mg) by oral 

route once daily for 7 days              

 

                Sporanox 100 mg oral capsule                                 noemi

e 1 capsule (100 mg) by oral route once 

daily with food                          

 

                Sunscreen SPF 30 2015                       apply q2h while

 outdoors, q1h if swimming or 

sweating                                 

 

             Vaseline     2015                 apply to treated area  

 

                urea 40 % topical cream 2015                       apply to

 the affected area(s) by topical 

route once a day                         

 

                econazole 1 % topical cream 2015                       appl

y to the affected and surrounding 

areas of skin by topical route once daily  

 

                Zetia 10 mg oral tablet                                 take 1 t

ablet (10 mg) by oral route once daily for 

30 days                                  

 

                amoxicillin 875 mg oral tablet                                 t

irvin 1 tablet (875 mg) by oral route every 12

hours for 10 days, finish 16        

 

                Nexium 40 mg oral capsule,delayed release(DR/EC) 2016      

 10/24/2016      take 1 

capsule (40 mg) by oral route once daily for 4 weeks  

 

                Keflex 500 mg oral capsule 4/3/2017        2017       take 

1 capsule (500 mg) by oral 

route every 12 hours for 14 days         

 

                Cipro 500 mg oral tablet                                 take 1 

tablet (500 mg) by oral route every 12 hours

for 7 days                               

 

                Hyzaar 100-25 mg oral tablet 2018       noemi

e 1 tablet by oral route once

daily for 30 days                        

 

                Naprosyn 500 mg oral tablet 2018       take

 1 tablet (500 mg) by oral 

route 2 times per day with food for 14 days  

 

                Cipro 250 mg oral tablet                                 take 1 

tablet (250 mg) by oral route every 12 hours

for 5 days                               

 

                Zithromax 500 mg oral tablet 3/21/2019       3/28/2019       noemi

e 1 tablet (500 mg) by oral 

route once daily for 7 days              

 

                Carafate 1 gram oral tablet 2019       take

 1 tablet by oral route 4 

times a day for 14 days                  

 

                Suprep Bowel Prep Kit 17.5-3.13-1.6 gram oral recon soln 20

19                       take as 

directed                                 

 

                Augmentin 875-125 mg oral tablet 10/11/2019      10/18/2019     

 take 1 tablet by oral 

route every 12 hours for 7 days          

 

                gemfibrozil 600 mg oral tablet 2019        T

IRVIN 1 TABLET BY MOUTH TWICE 

DAILY BEFORE MORNING AND EVENING MEALS for 30 days  

 

                amoxicillin 875 mg oral tablet 9/3/2021        9/10/2021       t

irvin 1 tablet (875 mg) by oral

route every 12 hours for 7 days          







                                        Discontinued 

 

             Name         Start Date   Discontinued Date SIG          Comments

 

                Vaseretic 10-25 mg oral tablet                 3/1/2011        t

irvin 1 tablet by oral route once daily

                                         

 

                Omnaris 50 mcg nasal spray,non-aerosol 2010 

       inhale 2 sprays (100 

mcg) in each nostril by intranasal route once daily  

 

                Mobic 15 mg oral tablet 2010       3/1/2011        take 1 t

ablet (15 mg) by oral route 

once daily                               

 

                Cipro 500 mg oral tablet                 2013        take 1 

tablet (500 mg) by oral route every 

12 hours for 7 days                      

 

                amoxicillin 500 mg oral capsule                 2013        

take 1 capsule (500 mg) by oral route

3 times per day x 7days                  

 

                Levaquin 500 mg oral tablet                 2013        take

 1 tablet (500 mg) by oral route once

daily for 7 days                         

 

                hydrocortisone 2.5 % topical cream 2014    

   apply to the affected 

area(s) by topical route once daily      

 

                losartan-hydrochlorothiazide 100-25 mg oral tablet 10/13/2014   

   2015        TAKE ONE 

TABLET BY MOUTH IN THE MORNING           

 

                Hyzaar 100-25 mg oral tablet 2017        noemi

e 1 tablet by oral route once

daily for 30 days                        

 

                Diflucan 150 mg oral tablet                 2018       take

 1 tablet (150 mg) by oral route 

once                                     

 

                Macrobid 100 mg oral capsule 10/12/2017      2018       noemi

e 1 capsule (100 mg) by 

oral route every 12 hours with food for 7 days  

 

                Cipro 500 mg oral tablet 10/17/2017      2018       take 1 

tablet (500 mg) by oral 

route every 12 hours for 7 days          

 

                Iron (ferrous sulfate) 325 mg (65 mg iron) oral tablet          

       2021       take 1 tablet

(325 mg) by oral route 2 times per day   

 

                Dexilant 60 mg oral capsule,biphase delayed releas 2019       take 1 

capsule (60 mg) by oral route once daily  







Problem List





                    Description         Status              Onset

 

                    Hypertension        Active               

 

                    Gout                Active               

 

                    Heart disease       Active               

 

                    Hyperlipidemia      Active               

 

                    Arthritis unspecified Active               

 

                    Actinic Keratosis   Active              2015

 

                    Onychomycosis       Active              2015

 

                    Seborrheic keratoses Active              2015

 

                    BCC (basal cell carcinoma of skin) Active              

 

                    Keratosis           Active              2017

 

                    Dyspepsia           Active              2019

 

                    Change in bowel habit Active              2019







Vital Signs





     Date Time BP-Sys(mm[Hg] BP-Ester(mm[Hg]) HR(bpm) RR(rpm) Temp WT   HT   HC   

BMI  BSA  BMI

 Percentile                             O2 Sat(%)

 

        9/3/2021 10:48:00  mm[Hg] 70 mm[Hg] 74 {beats}/min 20 rpm  98.1 F 

 161.5 lbs

           66 in                 26.0665 kg/m2 1.847 m2              97 %

 

        2021 10:04:00  mm[Hg] 72 mm[Hg] 70 {beats}/min 18 rpm  98.6 F

  150.25 

lbs        66 in                 24.25 kg/m2 1.78 m2               98 %

 

        10/11/2019 10:55:00  mm[Hg] 60 mm[Hg] 65 {beats}/min 18 rpm  97.9 

F  141 lbs

           66 in                 22.7578 kg/m2 1.7258 m2             98 %

 

        2019 1:58:00  mm[Hg] 72 mm[Hg] 65 {beats}/min 20 rpm  97 F    

145.5 lbs 66

 in                       23.48 kg/m2  1.75 m2                    

 

        2019 8:53:00  mm[Hg] 63 mm[Hg] 70 {beats}/min 70 rpm  97.5 F 

 145.5 lbs

           66 in                 23.4841 kg/m2 1.7531 m2              

 

        2019 8:47:00  mm[Hg] 63 mm[Hg] 75 {beats}/min 20 rpm  98.8 F 

 150 lbs 

66 in                     24.21 kg/m2  1.78 m2                    

 

        2019 9:02:00  mm[Hg] 78 mm[Hg] 65 {beats}/min 18 rpm  98.4 F 

 156.375 

lbs        67 in                 24.4915 kg/m2 1.8311 m2             98 %

 

        2019 9:50:00  mm[Hg] 68 mm[Hg] 68 {beats}/min 18 rpm  97.9 F 

 156 lbs 

67 in                     24.43 kg/m2  1.83 m2                   100 %

 

        2019 9:10:00  mm[Hg] 70 mm[Hg] 65 {beats}/min 18 rpm  97.2 F 

 158.5 lbs

           67 in                 24.8244 kg/m2 1.8435 m2             97 %

 

        3/21/2019 8:53:00  mm[Hg] 72 mm[Hg] 80 {beats}/min 20 rpm  97.5 F 

 154.375 

lbs        67 in                 24.18 kg/m2 1.82 m2               96 %

 

        2019 8:54:00  mm[Hg] 72 mm[Hg] 64 {beats}/min 14 rpm  97.9 F  

156 lbs 67

 in                       24.4328 kg/m2 1.8289 m2                 98 %

 

        2018 11:03:00  mm[Hg] 70 mm[Hg] 68 {beats}/min 16 rpm  98.1 

F  163 lbs

           67 in                 25.53 kg/m2 1.87 m2               98 %

 

        2018 8:39:00  mm[Hg] 74 mm[Hg] 65 {beats}/min 18 rpm  97.7 F  

171.25 lbs

           67 in                 26.8213 kg/m2 1.9163 m2             97 %

 

        2018 9:06:00  mm[Hg] 92 mm[Hg] 62 {beats}/min 16 rpm  96.1 F 

 169 lbs 

67 in                     26.47 kg/m2  1.90 m2                   97 %

 

        10/12/2017 9:18:00  mm[Hg] 70 mm[Hg] 61 {beats}/min 14 rpm  97.3 F

  168.25 

lbs        67 in                 26.3514 kg/m2 1.8994 m2             99 %

 

        2017 8:13:00  mm[Hg] 76 mm[Hg] 69 {beats}/min 18 rpm  97.3 F  

172 lbs 67

 in                       26.94 kg/m2  1.92 m2                   98 %

 

        2017 11:06:00  mm[Hg] 98 mm[Hg] 62 {beats}/min 16 rpm  96.9 F

  178 lbs 

67 in                     27.8785 kg/m2 1.9537 m2                 9 %

 

        2017 10:35:00  mm[Hg] 88 mm[Hg] 67 {beats}/min 20 rpm  97.8 F 

 179 lbs 

67 in                     28.04 kg/m2  1.96 m2                    

 

        3/24/2017 9:50:00  mm[Hg] 88 mm[Hg] 67 {beats}/min 20 rpm  97.8 F 

 179 lbs 

67 in                     28.0351 kg/m2 1.9591 m2                  

 

        3/1/2017 9:25:00  mm[Hg] 78 mm[Hg] 62 {beats}/min 17 rpm  97.6 F  

180 lbs 67

 in                       28.19 kg/m2  1.96 m2                   97 %

 

        2016 10:56:00  mm[Hg] 88 mm[Hg] 60 {beats}/min 18 rpm  97.6 F

  179 lbs 

67 in                     28.0351 kg/m2 1.9591 m2                 96 %

 

        2016 8:24:00  mm[Hg] 70 mm[Hg] 72 {beats}/min 18 rpm  97.5 F 

 174 lbs 

67 in                     27.25 kg/m2  1.93 m2                   99 %

 

        2015 1:40:00  mm[Hg] 72 mm[Hg] 57 {beats}/min 18 rpm  98.5 F  

191.25 lbs

           67 in                 29.9537 kg/m2 2.0251 m2             96 %

 

        2014 2:11:00  mm[Hg] 64 mm[Hg] 74 {beats}/min 20 rpm  97.6 F 

 205 lbs 

67 in                     32.11 kg/m2  2.10 m2                    

 

        2014 9:28:00  mm[Hg] 80 mm[Hg] 72 {beats}/min 18 rpm  96.8 F 

 203 lbs 

67 in                     31.794 kg/m2 2.0863 m2                  

 

        10/7/2013 2:22:00  mm[Hg] 75 mm[Hg] 70 {beats}/min 18 rpm  98 F   

 206 lbs 67 

in                        32.26 kg/m2  2.10 m2                   95 %

 

        2013 9:42:00  mm[Hg] 72 mm[Hg] 68 {beats}/min 18 rpm  97.4 F  

205 lbs 67

 in                       32.1072 kg/m2 2.0966 m2                  

 

        3/15/2013 10:41:00  mm[Hg] 70 mm[Hg] 60 {beats}/min 18 rpm  97.2 F

  201 lbs 

66 in                     32.44 kg/m2  2.06 m2                    

 

        2012 1:46:00  mm[Hg] 78 mm[Hg] 60 {beats}/min 18 rpm  97.2 F

  203 lbs 

                                                                  

 

        2012 8:48:00  mm[Hg] 80 mm[Hg] 62 {beats}/min 18 rpm  97 F   

 207 lbs 66 

in                        33.41 kg/m2  2.091 m2                   

 

        3/31/2011 9:58:00  mm[Hg] 82 mm[Hg] 68 {beats}/min 20 rpm  97.2 F 

 203 lbs  

                                                                  

 

      3/1/2011 9:05:00  mm[Hg] 74 mm[Hg] 64 {beats}/min 20 rpm 97 F  203 l

bs             

                                                             

 

      2010 9:03:00  mm[Hg] 72 mm[Hg] 58 {beats}/min 20 rpm       203 

lbs                   

                                                     

 

        2010 8:40:00  mm[Hg] 88 mm[Hg] 68 {beats}/min 18 rpm  96.9 F 

 209 lbs  

                                                                  







Social History





                    Name                Description         Comments

 

                    denies alcohol use                       

 

                    Lives with spouse                        

 

                    Tobacco             Never smoker         







History of Procedures





                    Date Ordered        Description         Order Status

 

                    2016 12:00 AM  COMPLETE CBC W/AUTO DIFF WBC Reviewed

 

                    2016 12:00 AM  COMPREHEN METABOLIC PANEL Reviewed

 

                    2016 12:00 AM  LIPID PANEL         Reviewed

 

                    2016 12:00 AM  GLYCOSYLATED HEMOGLOBIN TEST Reviewed

 

                    2012 12:00 AM  COMPLETE CBC W/AUTO DIFF WBC Reviewed

 

                    2012 12:00 AM  COMPREHEN METABOLIC PANEL Reviewed

 

                    2012 12:00 AM  LIPID PANEL         Reviewed

 

                    2012 12:00 AM  GLYCOSYLATED HEMOGLOBIN TEST Reviewed

 

                    2012 12:00 AM  ASSAY THYROID STIM HORMONE Reviewed

 

                    2012 12:00 AM  ASSAY OF BLOOD/URIC ACID Reviewed

 

                    2012 12:00 AM  URINALYSIS AUTO W/SCOPE Reviewed

 

                    3/1/2017 12:00 AM   COMPLETE CBC W/AUTO DIFF WBC Reviewed

 

                    3/1/2017 12:00 AM   COMPREHEN METABOLIC PANEL Reviewed

 

                    3/1/2017 12:00 AM   LIPID PANEL         Reviewed

 

                    3/1/2017 12:00 AM   ASSAY THYROID STIM HORMONE Reviewed

 

                    3/1/2017 12:00 AM   VITAMIN B-12        Reviewed

 

                    3/24/2017 12:00 AM  CMPLX RPR F/C/C/M/N/AX/G/H/F Reviewed

 

                    2017 12:00 AM   COMPLETE CBC W/AUTO DIFF WBC Returned

 

                    2017 12:00 AM   COMPREHEN METABOLIC PANEL Returned

 

                    2017 12:00 AM   ASSAY THYROID STIM HORMONE Returned

 

                    2017 12:00 AM   VITAMIN B-12        Returned

 

                    2017 12:00 AM   ASSAY OF BLOOD/URIC ACID Returned

 

                    2017 12:00 AM   X-RAY EXAM OF HAND  Returned

 

                    2017 12:00 AM   ASSAY OF PREALBUMIN Returned

 

                    10/12/2017 12:00 AM URINE CULTURE/COLONY COUNT Reviewed

 

                    10/12/2017 9:44 AM  URINALYSIS AUTO W/O SCOPE Reviewed

 

                    2018 12:00 AM  COMPLETE CBC W/AUTO DIFF WBC Returned

 

                    2018 12:00 AM  COMPREHEN METABOLIC PANEL Returned

 

                    2018 12:00 AM  LIPID PANEL         Returned

 

                    2018 12:00 AM  GLYCOSYLATED HEMOGLOBIN TEST Returned

 

                    2018 12:00 AM  ASSAY OF BLOOD/URIC ACID Returned

 

                    2018 12:00 AM  ASSAY OF URINE/URIC ACID Returned

 

                    3/4/2013 12:00 AM   CHEST X-RAY 2VW FRONTAL&LATL Reviewed

 

                    2010 12:00 AM  COMPLETE CBC W/AUTO DIFF WBC Reviewed

 

                    2010 12:00 AM  COMPREHEN METABOLIC PANEL Reviewed

 

                    2010 12:00 AM  LIPID PANEL         Reviewed

 

                    2010 12:00 AM  ASSAY OF BLOOD/URIC ACID Reviewed

 

                    2010 12:00 AM  GLYCOSYLATED HEMOGLOBIN TEST Reviewed

 

                    2018 12:00 AM CHEST X-RAY 2VW FRONTAL&LATL Returned

 

                    2018 12:00 AM COMPLETE CBC W/AUTO DIFF WBC Returned

 

                    2018 12:00 AM COMPREHEN METABOLIC PANEL Returned

 

                    2018 12:00 AM ASSAY OF TROPONIN QUANT Returned

 

                    2013 12:00 AM   COMPLETE CBC W/AUTO DIFF WBC Reviewed

 

                    2013 12:00 AM   COMPREHEN METABOLIC PANEL Reviewed

 

                    2013 12:00 AM   GLYCOSYLATED HEMOGLOBIN TEST Reviewed

 

                    2019 12:00 AM  METABOLIC PANEL TOTAL CA Returned

 

                    2019 12:00 AM  CT ABD & PELV W/CONTRAST Returned

 

                    2019 12:00 AM   US EXAM ABDO BACK WALL COMP Returned

 

                    2019 3:40 PM   URINALYSIS AUTO W/O SCOPE Reviewed

 

                    2019 12:00 AM  COMPLETE CBC W/AUTO DIFF WBC Reviewed

 

                    2019 12:00 AM  COMPREHEN METABOLIC PANEL Reviewed

 

                    2019 12:00 AM  ELECTROCARDIOGRAM TRACING Reviewed

 

                    2019 12:00 AM  ASSAY OF TROPONIN QUANT Reviewed

 

                    2019 12:00 AM  FECES CULTURE AEROBIC BACT Reviewed

 

                    2019 12:00 AM  C DIFF AMPLIFIED PROBE Reviewed

 

                    2019 12:00 AM  Tick Panel          Reviewed

 

                    2019 12:00 AM  METABOLIC PANEL TOTAL CA Returned

 

                    2019 12:00 AM  COMPLETE CBC W/AUTO DIFF WBC Returned

 

                    2019 12:00 AM  VITAMIN B-12        Returned

 

                    2019 12:00 AM  ASSAY OF IRON       Returned

 

                    6/10/2019 12:00 AM  ECHO EXAM OF ABDOMEN Reviewed

 

                    6/10/2019 12:00 AM  HEPATOBILIARY SYSTEM IMAGING Reviewed

 

                    2019 12:00 AM  METABOLIC PANEL TOTAL CA Returned

 

                    2019 12:00 AM  ASSAY OF IRON       Returned

 

                    10/11/2019 12:00 AM CT THORAX W/DYE     Returned

 

                    10/14/2019 12:00 AM METABOLIC PANEL TOTAL CA Returned

 

                    2020 12:00 AM   COMPLETE CBC W/AUTO DIFF WBC Returned

 

                    2020 12:00 AM   COMPREHEN METABOLIC PANEL Returned

 

                    2020 12:00 AM   LIPID PANEL         Returned

 

                    2020 12:00 AM   CT THORAX W/DYE     Returned

 

                    2021 12:00 AM  COMPLETE CBC W/AUTO DIFF WBC Returned

 

                    2021 12:00 AM  COMPREHEN METABOLIC PANEL Returned

 

                    2021 12:00 AM  ASSAY THYROID STIM HORMONE Returned

 

                    2021 12:00 AM  ASSAY OF TROPONIN QUANT Returned

 

                    2021 12:00 AM  ASSAY OF BLOOD/URIC ACID Returned

 

                    2021 12:00 AM  ASSAY OF IRON       Returned

 

                    2021 12:00 AM  IRON BINDING TEST   Returned

 

                    2021 12:00 AM  ASSAY OF TRANSFERRIN Returned

 

                    2021 12:00 AM  VITAMIN B-12        Returned

 

                    2021 12:00 AM  COMPLETE CBC W/AUTO DIFF WBC Returned

 

                    2021 12:00 AM  METABOLIC PANEL TOTAL CA Returned

 

                    9/3/2021 12:00 AM   COMPLETE CBC W/AUTO DIFF WBC Returned

 

                    9/3/2021 12:00 AM   COMPREHEN METABOLIC PANEL Returned

 

                    9/3/2021 12:00 AM   ASSAY OF NATRIURETIC PEPTIDE Returned

 

                    9/3/2021 12:00 AM   ASSAY THYROID STIM HORMONE Returned

 

                    9/3/2021 12:00 AM   ASSAY OF BLOOD/URIC ACID Returned

 

                    9/3/2021 12:00 AM   ASSAY OF MAGNESIUM  Returned

 

                    9/3/2021 12:00 AM   ASSAY OF IRON       Returned

 

                    9/3/2021 12:00 AM   IRON BINDING TEST   Returned

 

                    9/3/2021 12:00 AM   ASSAY OF TRANSFERRIN Returned

 

                    9/3/2021 12:00 AM   VITAMIN B-12        Returned

 

                    2021 12:00 AM   COMPLETE CBC W/AUTO DIFF WBC Returned

 

                    2/15/2011 12:00 AM  ROUTINE VENIPUNCTURE Reviewed

 

                    2/15/2011 12:00 AM  COMPREHEN METABOLIC PANEL Reviewed

 

                    2/15/2011 12:00 AM  LIPID PANEL         Reviewed

 

                    2/15/2011 12:00 AM  ASSAY OF BLOOD/URIC ACID Reviewed

 

                    2010 12:00 AM  TTE W/O DOPPLER COMPLETE Reviewed

 

                    2015 12:00 AM  DESTRUCT PREMALG LESION Reviewed

 

                    2015 12:00 AM   COMPLETE CBC W/AUTO DIFF WBC Reviewed

 

                    2015 12:00 AM   COMPREHEN METABOLIC PANEL Reviewed

 

                    2015 12:00 AM   LIPID PANEL         Reviewed

 

                    2015 12:00 AM   GLYCOSYLATED HEMOGLOBIN TEST Reviewed

 

                    2015 12:00 AM   ASSAY OF BLOOD/URIC ACID Reviewed







Results Summary





                          Date and Description      Results

 

                          2010 9:45 AM         GLYCOHEMOGLOBIN A1C 6.30 %UR

IC ACID 5.3 mg/dLTRIGLYCERIDES 

90.0 mg/dLCHOLESTEROL 209.0 mg/dLHDL 43.0 mg/dLTOT CHOL/HDL 4.9 LDL (CALC) 148.0
 mg/dLWBC 10.0 RBC 4.75 HGB 14.10 g/dLHCT 41.0 %MCV 86.0 fLMCH 29.70 pgMCHC 
34.40 g/dLRDW SD 45 RDW CV 14.50 %MPV 10.40 fLPLT 354 NRBC# 0.00 NRBC% 0.0 %NEUT
 63.60 %%LYMP 21.60 %%MONO 5.90 %%EOS 8.30 %%BASO 0.60 %#NEUT 6.35 #LYMP 2.16 
#MONO 0.59 #EOS 0.83 #BASO 0.06 MANUAL DIFF NOT IND GLUCOSE 112.0 mg/dLSODIUM 
142.0 mmol/LPOTASSIUM 3.90 mmol/LCHLORIDE 103.0 mmol/LCO2 28.0 mmol/LBUN 20.0 
mg/dLCREATININE 0.90 mg/dLSGOT/AST 23.0 IU/LSGPT/ALT 13.0 IU/LALK PHOS 75.0 
IU/LTOTAL PROTEIN 6.90 g/dLALBUMIN 4.30 g/dLTOTAL BILI 0.50 mg/dLCALCIUM 9.0 
mg/dLAGE 70 GFR NonAA 62 GFR AA 75 eGFR >60 mL/min/1.73 m2eGFR AA* >60 

 

                          2/15/2011 9:15 AM         TRIGLYCERIDES 97.0 mg/dLCHOL

ESTEROL 212.0 mg/dLHDL 48.0 

mg/dLTOT CHOL/HDL 4.4 LDL (CALC) 145.0 mg/dLURIC ACID 5.3 mg/dLGLUCOSE 110.0 
mg/dLSODIUM 141.0 mmol/LPOTASSIUM 4.10 mmol/LCHLORIDE 102.0 mmol/LCO2 29.0 
mmol/LBUN 18.0 mg/dLCREATININE 0.90 mg/dLSGOT/AST 31.0 IU/LSGPT/ALT 17.0 IU/LALK
 PHOS 74.0 IU/LTOTAL PROTEIN 7.60 g/dLALBUMIN 4.30 g/dLTOTAL BILI 0.30 
mg/dLCALCIUM 10.20 mg/dLAGE 71 GFR NonAA 62 GFR AA 75 eGFR >60 mL/min/1.73 
m2eGFR AA* >60 

 

                          2012 9:55 AM         WBC 8.7 RBC 4.63 HGB 13.40 g

/dLHCT 40.70 %MCV 88.0 fLMCH 28.90

 pgMCHC 32.90 g/dLRDW SD 47 RDW CV 14.70 %MPV 10.20 fLPLT 388 NRBC# 0.00 NRBC% 
0.0 %NEUT 59.90 %%LYMP 22.40 %%MONO 5.60 %%EOS 11.50 %%BASO 0.60 %#NEUT 5.20 
#LYMP 1.94 #MONO 0.49 #EOS 1.00 #BASO 0.05 MANUAL DIFF NOT IND GLUCOSE 114.0 
mg/dLSODIUM 142.0 mmol/LPOTASSIUM 3.80 mmol/LCHLORIDE 104.0 mmol/LCO2 28.0 
mmol/LBUN 23.0 mg/dLCREATININE 0.90 mg/dLSGOT/AST 20.0 IU/LSGPT/ALT 15.0 IU/LALK
 PHOS 69.0 IU/LTOTAL PROTEIN 7.20 g/dLALBUMIN 4.30 g/dLTOTAL BILI 0.30 
mg/dLCALCIUM 9.90 mg/dLAGE 72 GFR NonAA 62 GFR AA 75 eGFR >60 mL/min/1.73 m2eGFR
 AA* >60 TRIGLYCERIDES 94.0 mg/dLCHOLESTEROL 217.0 mg/dLHDL 43.0 mg/dLTOT 
CHOL/HDL 5.0 LDL (CALC) 155.0 mg/dLURIC ACID 5.6 mg/dLTSH 1.920 
uIU/mLGLYCOHEMOGLOBIN A1C 6.30 %

 

                          2012 2:42 PM         COLOR YELLOW APPEARANCE HAZY

 SPEC GRAV 1.025 pH 5.5 PROTEIN 

NEGATIVE GLUCOSE NEGATIVE KETONE NEGATIVE BILIRUBIN NEGATIVE BLOOD TRACE-INTACT 
NITRITE NEGATIVE LEUK SCREEN MODERATE WBC/HPF  RBC/HPF 0-5 CASTS/LPF 
NEGATIVE CRYSTALS NEGATIVE MUCOUS THRDS NEGATIVE BACTERIA 1+ EPITH CELLS 1+ 
RENAL TRICHOMONAS NEGATIVE YEAST NEGATIVE CULT SET UP? YES 

 

                          2013 10:48 AM         WBC 10.3 RBC 4.50 HGB 13.30 

g/dLHCT 39.10 %MCV 87.0 fLMCH 

29.60 pgMCHC 34.0 g/dLRDW SD 46 RDW CV 14.60 %MPV 9.80 fLPLT 354 NRBC# 0.00 
NRBC% 0.0 %NEUT 63.60 %%LYMP 23.40 %%MONO 4.90 %%EOS 7.70 %%BASO 0.40 %#NEUT 
6.56 #LYMP 2.42 #MONO 0.51 #EOS 0.79 #BASO 0.04 MANUAL DIFF NOT IND HGB A1C 6.20
 %Est Avg Glucose 131.2 mg/dLGLUCOSE 90.0 mg/dLSODIUM 142.0 mmol/LPOTASSIUM 3.80
 mmol/LCHLORIDE 102.0 mmol/LCO2 29.0 mmol/LBUN 19.0 mg/dLCREATININE 0.90 
mg/dLSGOT/AST 24.0 IU/LSGPT/ALT 17.0 IU/LALK PHOS 77.0 IU/LTOTAL PROTEIN 7.30 
g/dLALBUMIN 4.20 g/dLTOTAL BILI 0.40 mg/dLCALCIUM 10.60 mg/dLAGE 73 GFR NonAA 62
 GFR AA 75 eGFR 60 eGFR AA* 60 

 

                          4/10/2015 9:58 AM         WBC 8.5 RBC 4.40 HGB 12.90 g

/dLHCT 39.40 %MCV 90.0 fLMCH 29.30

 pgMCHC 32.70 g/dLRDW SD 47 RDW CV 14.40 %MPV 9.90 fLPLT 393 NRBC# 0.00 NRBC% 
0.0 %NEUT 56.0 %%LYMP 25.60 %%MONO 5.40 %%EOS 12.50 %%BASO 0.50 %#NEUT 4.75 
#LYMP 2.17 #MONO 0.46 #EOS 1.06 #BASO 0.04 MANUAL DIFF NOT IND GLUCOSE 123.0 
mg/dLSODIUM 142.0 mmol/LPOTASSIUM 4.10 mmol/LCHLORIDE 105.0 mmol/LCO2 26.0 
mmol/LBUN 22.0 mg/dLCREATININE 1.10 mg/dLSGOT/AST 20.0 IU/LSGPT/ALT 15.0 IU/LALK
 PHOS 94.0 IU/LTOTAL PROTEIN 7.70 g/dLALBUMIN 4.20 g/dLTOTAL BILI 0.30 
mg/dLCALCIUM 10.50 mg/dLAGE 75 GFR NonAA 49 GFR AA 59 eGFR 49 eGFR AA* 59 
TRIGLYCERIDES 111.0 mg/dLCHOLESTEROL 201.0 mg/dLHDL 35.0 mg/dLTOT CHOL/HDL 5.7 
LDL (CALC) 144.0 mg/dLURIC ACID 4.7 mg/dLHGB A1C 6.30 %Est Avg Glucose 134.1 
mg/dL

 

                          2016 8:47 AM         WBC 7.8 RBC 4.67 HGB 13.50 g

/dLHCT 40.90 %MCV 88.0 fLMCH 28.90

 pgMCHC 33.0 g/dLRDW SD 46 RDW CV 14.50 %MPV 10.30 fLPLT 360 NRBC# 0.00 NRBC% 
0.0 %NEUT 59.50 %%LYMP 25.90 %%MONO 6.10 %%EOS 7.90 %%BASO 0.60 %#NEUT 4.65 
#LYMP 2.03 #MONO 0.48 #EOS 0.62 #BASO 0.05 MANUAL DIFF NOT IND GLUCOSE 115.0 
mg/dLSODIUM 144.0 mmol/LPOTASSIUM 3.90 mmol/LCHLORIDE 99.0 mmol/LCO2 30.0 
mmol/LBUN 30.0 mg/dLCREATININE 1.30 mg/dLSGOT/AST 19.0 IU/LSGPT/ALT 14.0 IU/LALK
 PHOS 96.0 IU/LTOTAL PROTEIN 6.90 g/dLALBUMIN 4.40 g/dLTOTAL BILI 0.40 
mg/dLCALCIUM 10.80 mg/dLAGE 76 GFR NonAA 40 GFR AA 48 eGFR 40 eGFR AA* 48 
TRIGLYCERIDES 127.0 mg/dLCHOLESTEROL 204.0 mg/dLHDL 42.0 mg/dLTOT CHOL/HDL 4.9 
LDL (CALC) 137.0 mg/dLHemoglobin A1c 6.30 %Estim. Avg Glu (eAG) 134 

 

                          3/1/2017 9:48 AM          GLUCOSE 101.0 mg/dLSODIUM 14

3.0 mmol/LPOTASSIUM 4.10 

mmol/LCHLORIDE 108.0 mmol/LCO2 25.0 mmol/LBUN 20.0 mg/dLCREATININE 1.20 
mg/dLSGOT/AST 19.0 IU/LSGPT/ALT 10.0 IU/LALK PHOS 96.0 IU/LTOTAL PROTEIN 7.60 
g/dLALBUMIN 4.0 g/dLTOTAL BILI 0.30 mg/dLCALCIUM 10.0 mg/dLAGE 77 GFR NonAA 44 
GFR AA 53 eGFR 44 eGFR AA* 53 TRIGLYCERIDES 61.0 mg/dLCHOLESTEROL 190.0 mg/dLHDL
 40.0 mg/dLTOT CHOL/HDL 4.8 LDL (CALC) 138.0 mg/dLWBC 10.8 RBC 4.50 HGB 13.10 
g/dLHCT 38.90 %MCV 86.0 fLMCH 29.10 pgMCHC 33.70 g/dLRDW SD 45 RDW CV 14.40 %MPV
 9.70 fLPLT 341 NRBC# 0.00 NRBC% 0.0 %NEUT 66.20 %%LYMP 15.80 %%MONO 4.80 %%EOS 
12.30 %%BASO 0.50 %#NEUT 7.19 #LYMP 1.71 #MONO 0.52 #EOS 1.33 #BASO 0.05 MANUAL 
DIFF NOT IND TSH 1.950 uIU/mLVITAMIN B12 331.0 pg/mL

 

                          10/12/2017 9:44 AM        Color Ur lt. yellow Glucose 

Ur-sCnc neg Bilirub Ur Ql Strip 

neg Ketones Ur Ql Strip neg Sp Gr Ur Qn 1.015 Hgb Ur Ql Strip neg pH Ur-LsCnc 
5.5 Prot Ur Ql Strip trace Urobilinogen Ur-mCnc 0.2eu/dl Nitrite Ur Ql Strip neg
 WBC Est Ur Ql Strip large 

 

                          2018 2:05 PM        Falls in last 6 months? No U

nsteady or worry about falling? 

Yes Fall Risk Assessment At Risk 

 

                          2019 10:44 AM        WBC 8.0 RBC 4.11 HGB 10.70 g

/dLHCT 34.60 %MCV 84.0 fLMCH 26.0

 pgMCHC 30.90 g/dLRDW SD 48 fLRDW CV 15.70 %MPV 9.90 fLPLT 517 NRBC# 0.00 NRBC% 
0.0 %NEUT 75.1 %LYMP 14.4 %MONO 5.9 %EOS 3.9 %BASO 0.4 #NEUT 6.02 #LYMP 1.15 
#MONO 0.47 #EOS 0.31 #BASO 0.03 MANUAL DIFF NOT IND GLUCOSE 101 SODIUM 142 
POTASSIUM 3.9 CHLORIDE 107.0 mmol/LCO2 27 BUN 18.0 mg/dLCREATININE 1.040 
mg/dLSGOT/AST 13 SGPT/ALT 6 ALK PHOS 111 TOTAL PROTEIN 7.5 ALBUMIN 3.5 TOTAL 
BILI 0.2 CALCIUM 10.20 mg/dLAGE 79 GFR NonAA 51 GFR AA 62 eGFR 51 eGFR AA* >60 
mL/min/1.73 [id number] AD 0.06 TOXIGENIC C DIFF NEGATIVE 027 NAP1 STRAIN 
PRESUMPTIVE NEGATIVE Lyme IgG/IgM Ab <0.91 Lyme Disease Ab, Quant,IgM WILL 
FOLLOW RMSF, IgG, EIA WILL FOLLOW [facility] Spotted Fever,IgM WILL FOLLOW E. 
chaffeensis ([facility]ME) IgGTiter WILL FOLLOW E. chaffeensis ([facility]) IgMTiter WILL FOLLOW
 

 

                          2019 3:40 PM         Color Ur lt. yellow Glucose 

Ur-sCnc neg Bilirub Ur Ql Strip 

neg Ketones Ur Ql Strip neg Sp Gr Ur Qn 1.010 Hgb Ur Ql Strip neg pH Ur-LsCnc 
5.0 Prot Ur Ql Strip 100mg/dl Urobilinogen Ur-mCnc 0.2eu/dl Nitrite Ur Ql Strip 
neg WBC Est Ur Ql Strip neg 







History Of Immunizations





      Name  Date Admin Mfg Name Mfg Code Trade Name Lot#  Route Inj   Vis Given 

Vis Pub 

CVX

 

        Pneumococcal 10/1/2019 Not Entered NE      PREVNAR 13 [id number]  Intramuscu

lar Left Arm 

10/1/2019                 1                  133

 

        Influenza 10/1/2019 Not Entered NE      FLUZONE-HIGH DOSE [id number] Intram

uscular Left 

Arm                 10/1/2019           1            135

 

        Influenza 2020 Not Entered NE      FLUZONE-HIGH DOSE         Not Ent

ered Not Entered 

1                  135







History of Past Illness





                    Name                Date of Onset       Comments

 

                    Hypertension        2010  8:42AM  

 

                    Hyperlipidemia, unspecified 2010  8:42AM  

 

                    Arthritis unspecified                      

 

                    Heart disease                            

 

                    Hypertension                             

 

                    Hyperlipidemia                           

 

                    Gout                                     

 

                    Hypertension        2010  9:09AM  

 

                    Gout                2010  9:09AM  

 

                    Osteoarthritis      2010  9:09AM  

 

                    Hyperglycemia       2010  9:09AM  

 

                    Onychodystrophy                          

 

                    Contact Dermatitis                       

 

                    Actinic Keratosis   2015           

 

                    Onychomycosis       2015           

 

                    Seborrheic keratoses 2015           

 

                    Coronary Artery Disease Feb 15 2011  9:03AM  

 

                    Hypertension        Feb 15 2011  9:03AM  

 

                    Hyperlipidemia      Feb 15 2011  9:03AM  

 

                    Gout                Feb 15 2011  9:03AM  

 

                    Hypertension        Mar  1 2011  9:06AM  

 

                    Hyperlipidemia, unspecified Mar  1 2011  9:06AM  

 

                    Gout                Mar  1 2011  9:06AM  

 

                    Hypertension        Mar 31 2011 10:01AM  

 

                    Headache            Mar 31 2011 10:01AM  

 

                    BCC (basal cell carcinoma of skin) 2017           

 

                    Keratosis           2017           

 

                    Dyspepsia           2019           

 

                    Change in bowel habit 2019           

 

                    Hypertension        2012  8:54AM  

 

                    Hyperlipidemia, unspecified 2012  8:54AM  

 

                    Diabetes Mellitus, Type II 2012  8:54AM  

 

                    Gout                2012  8:54AM  

 

                    Night sweats        2012  8:54AM  

 

                    Dysuria             May 21 2012  2:12PM  

 

                    Chest Pain          Dec 13 2012  1:51PM  

 

                    Cough               Mar  4 2013 11:11AM  

 

                    Hypertension        Mar 15 2013 10:45AM  

 

                    Hypertension        Aug  9 2013  9:47AM  

 

                    Hyperglycemia       Aug  9 2013  9:47AM  

 

                    Onychomycosis of Fingernail Oct  7 2013  2:24PM  

 

                    Onychomycosis of Fingernail Oct  8 2013  9:23AM  

 

                    Onychomycosis of Fingernail 2014  9:35AM  

 

                    Atopic Dermatitis   2014  9:35AM  

 

                    Lung Nodule         2014  9:35AM  

 

                    Hypertension        2014  9:35AM  

 

                    Hypertension        2014  2:16PM  

 

                    Onychomycosis of Fingernail 2014  2:16PM  

 

                    Gout                2014  2:16PM  

 

                    Actinic keratosis   2015  2:14PM  

 

                    Onychomycosis       2015  2:14PM  

 

                    Seborrheic keratoses 2015  2:14PM  

 

                    Hypertension        2015  1:45PM  

 

                    Gout                2015  1:45PM  

 

                    Hyperglycemia       2015  1:45PM  

 

                    Hyperlipidemia, unspecified 2016  8:25AM  

 

                    Hyperglycemia       2016  8:25AM  

 

                    Esophageal Reflux   Sep 26 2016 11:00AM  

 

                    Hyperlipidemia, unspecified Mar  1 2017  9:27AM  

 

                    Fatigue             Mar  1 2017  9:27AM  

 

                    BCC (basal cell carcinoma of skin) Mar 24 2017 10:21AM  

 

                    BCC (basal cell carcinoma of skin) Mar 24 2017 10:21AM  

 

                    Keratosis           Mar 24 2017 10:21AM  

 

                    Hypertension        2017 11:14AM  

 

                    Hypertension        2017  8:16AM  

 

                    Gout                2017  8:16AM  

 

                    Fatigue             2017  8:16AM  

 

                    Weight loss         2017  8:16AM  

 

                    Acute cystitis without hematuria Oct 12 2017  9:21AM  

 

                    Hypertension        2018  9:14AM  

 

                    Hyperglycemia       2018  9:14AM  

 

                    Gout                2018  9:14AM  

 

                    Hypertension        May  4 2018  8:41AM  

 

                    TMJ syndrome        May  4 2018  8:41AM  

 

                    Chest pain          2018 11:10AM  

 

                    Dyspnea             2018 11:10AM  

 

                    Risk for falls      Dec 18 2018  2:05PM  

 

                    Gastritis           2019  9:00AM  

 

                    Upper Respiratory Infection Mar 21 2019  8:56AM  

 

                    IBS (irritable bowel syndrome) 2019  9:13AM  

 

                    Irritable bowel syndrome (IBS) 2019  3:27PM  

 

                    Blood tests prior to treatment or procedure 2019  3:2

7PM  

 

                    Renal cyst          May  8 2019 12:28PM  

 

                    Shortness Of Breath May 16 2019  9:52AM  

 

                    Chest pain          May 16 2019  9:52AM  

 

                    Diarrhea            May 16 2019  9:52AM  

 

                    GERD without esophagitis May 16 2019  9:52AM  

 

                                        Bitten or stung by nonvenomous insect an

d other nonvenomous arthropods, initial 

encounter                 May 16 2019  9:52AM        

 

                    Hypertension        May 21 2019  9:06AM  

 

                    Anemia              May 21 2019  9:06AM  

 

                    Gastritis           May 21 2019  9:06AM  

 

                    Change in bowel habit May 28 2019  8:40AM  

 

                    Dyspepsia           May 28 2019  8:40AM  

 

                    Abdominal Pain      [place] 10 2019 12:24PM  

 

                    Dyspepsia           [place] 10 2019 12:24PM  

 

                    Anemia, Iron Deficiency 2019 11:09AM  

 

                    Hypokalemia         2019 11:09AM  

 

                    Dyspepsia           2019  2:17PM  

 

                    Diarrhea            2019  2:17PM  

 

                    Biliary dyskinesia  2019  2:17PM  

 

                    Basal cell carcinoma Aug  7 2019  1:58PM  

 

                    Sebaceous cyst      Aug  7 2019  1:58PM  

 

                    Basal cell carcinoma of skin, site unspecified Aug  7 2019  

1:59PM  

 

                    Dyspnea             Oct 11 2019 10:58AM  

 

                    Chest pain          Oct 11 2019 10:58AM  

 

                    Blood tests prior to treatment or procedure Oct 14 2019 12:1

3PM  

 

                    Coronary Artery Disease 2020  3:09PM  

 

                    Hypertension        2020  3:09PM  

 

                    Hyperlipidemia      2020  3:09PM  

 

                    Pneumonia           2020  3:12PM  

 

                    Abnormal CT scan, chest 2020  3:12PM  

 

                    Anemia              2021 10:51AM  

 

                    Fatigue             2021 10:51AM  

 

                    Hypertension        2021 10:03AM  

 

                    Chest pain          2021 10:03AM  

 

                    CAD (coronary artery disease) 2021 10:03AM  

 

                    Gout                2021 10:03AM  

 

                    Anemia, Iron Deficiency 2021  3:28PM  

 

                    Anemia              Sep  3 2021 11:24AM  

 

                    Fatigue             Sep  3 2021 11:24AM  

 

                    CAD (coronary artery disease) Sep  3 2021 11:24AM  

 

                    Dyspnea             Sep  3 2021 11:24AM  

 

                    Cellulitis          Sep  3 2021 10:48AM  

 

                    Chronic Obstructive Pulmonary Disease Sep  3 2021 10:48AM  

 

                    PAD (peripheral artery disease) Sep  3 2021 10:48AM  

 

                    Anemia              Sep  7 2021  4:10PM  

 

                    Anemia              Sep 10 2021 11:12AM  







Payers





           Insurance Name Company Name Plan Name  Plan Number Policy Number Antony

cy Group 

Number                                  Start Date

 

                    Medicare RHC Medicare RHC           [id number]           N/

A

 

                    AARP      AARP                [id number]           N/A

 

                    Railroad Medicare Railroad Medicare           [id number]   

        

 

                    BCBS      [employer] [place]           [id number]           N/

A

 

                    Railroad Medicare - Railroad Railroad Medicare           [id number]

[id number]           N/A

 

                    Medicare Part A Medicare Part A           [id number]       

    N/A

 

                    Medicare Part A Medicare - Lab/Xray           [id number]   

        N/A

 

                    Medicare RHC Medicare RHC           [id number]           N/

A

 

                    Medicare Part B Medicare [place]           [id number]    

       N/A







History of Encounters





                    Visit Date          Visit Type          Provider

 

                    2021            Laboratory          Vijaya SAHRP RN

 

                    9/3/2021            Office visit        Joe Conley MD

 

                    2021           Office visit        Joe Conley MD

 

                    10/11/2019          Office visit        Joe Conley MD

 

                    2019           Procedures          Mike Mantilla DO

 

                    2019            Procedures          Mike Mantilla DO

 

                    2019           Surgery             Mike Mantilla DO

 

                    2019           [facility]            CHERISE Castillo MD

 

                    2019           Surgery             Mike Mantilla DO

 

                    6/10/2019           Surgery             Mike Mantilla DO

 

                    2019           Office visit        Mike Mantilla DO

 

                    2019           Office visit        Joe Conley MD

 

                    2019           Office visit        Ailyn BEARD

 

                    2019           Office visit        Joe Conley MD

 

                    3/21/2019           Office visit        Joe Conley MD

 

                    2019            Office visit        Joe Conley MD

 

                    2019            [facility]            CHERISE Castillo MD

 

                    2018          Office visit        Joe Conley MD

 

                    2018            Office visit        Joe Conley MD

 

                    2018           Office visit        Joe Conley MD

 

                    10/12/2017          Office visit        Ailyn BEARD

 

                    2017            Office visit        Joe Conley MD

 

                    2017           Office visit        Joe Conley MD

 

                    2017            Procedures          Mike Mantilla DO

 

                    3/24/2017           Procedures          Mike Mantilla DO

 

                    3/1/2017            Office visit        Joe Conley MD

 

                    2016           Office visit        Joe Conley MD

 

                    2016           Office visit        Joe Conley MD

 

                    2015            Office visit        Joe Conley MD

 

                    2015           Office visit        ADIS SAGASTUME

 

                    2014           Office visit        ADIS SAGASTUME

 

                    2014           Office visit        Joe Conley MD

 

                    2014           Office visit        Joe Conley MD

 

                    10/7/2013           Office visit        Joe Conley MD

 

                    2013            Office visit        Joe Conley MD

 

                    3/15/2013           Office visit        Joe Conley MD

 

                    2012          Office visit        Joe Conley MD

 

                    2012           Office visit        Joe Conley MD

 

                    3/31/2011           Office visit        Joe Conley MD

 

                    3/1/2011            Office visit        Joe Conley MD

 

                    2010           Laboratory          Ramy Raines MD

 

                    2010           Office visit        Joe Conley MD

 

                    2010           Office visit        Joe Conley MD

 

                    10/5/2009           Office visit        Joe Conley MD

## 2021-10-05 NOTE — XMS REPORT
MU2 Ambulatory Summary

                             Created on: 2021



Laury Og

External Reference #: [id number]

: 1940

Sex: Female



Demographics





                          Address                   [address]

[place]  [address]

 

                          Home Phone                [phone]

 

                          Preferred Language        English

 

                          Marital Status            

 

                          Christianity Affiliation     Unknown

 

                          Race                      White

 

                          Ethnic Group              Not  or 





Author





                          Author                    Laury Conley

 

                          Organization              [facility] 

oup

 

                          Address                   [address]

[place], [place]  [id number]



 

                          Phone                     [phone]







Care Team Providers





                    Care Team Member Name Role                Phone

 

                    Joe Conley     PCP                 [phone]

 

                    Joe Conley     PreferredProvider   [phone]







Allergies and Adverse Reactions





                    Name                Reaction            Notes

 

                    erythromycin        vomiting             

 

                    STATINS: HMG-COA REDUCTASE INHIBITORS joint pain           

 

                    BANANAS             severe stomache pain  

 

                    Demerol             vomiting             







Plan of Treatment





             Planned Activity Comments     Planned Date Planned Time Plan/Goal

 

             gastritis and anemia, needing endsocpy                             

            

 

             EKG.                      2018   12:00 AM      

 

             CBC with Auto              9/3/2021     12:00 AM      

 

             Comprehensive metabolic panel              9/3/2021     12:00 AM   

   

 

             BNP                       9/3/2021     12:00 AM      

 

             Thyroid stimulating hormone (TSH)              9/3/2021     12:00 A

M      

 

             URIC ACID.                9/3/2021     12:00 AM      

 

             Magnesium level              9/3/2021     12:00 AM      

 

             IRON PANEL                9/3/2021     12:00 AM      

 

             IRON PANEL                9/3/2021     12:00 AM      

 

             IRON PANEL                9/3/2021     12:00 AM      

 

             VITAMIN B12               9/3/2021     12:00 AM      







Medications





                                        Active 

 

             Name         Start Date   Estimated Completion Date SIG          Co

mments

 

             aspirin 81 mg oral tablet                           take 1 tablet b

y oral route daily  

 

             biotin 5 mg oral capsule                           take 1 capsule b

y oral route daily  

 

                vitamin B12-folic acid 500-400 mcg oral tablet                  

               take 1 tablet by oral route 

daily                                    

 

                ibuprofen 800 mg oral tablet                                 noemi

e 1 tablet (800 mg) by oral route 3 times 

per day with food                        

 

                potassium chloride 20 mEq oral tablet extended release 2019

                       take 1 tablet

(20 meq) by oral route 2 times per day with food  

 

                Lomotil 2.5-0.025 mg oral tablet                                

 take 2 tablets (5 mg) by oral route once 

daily as needed                          

 

                Levaquin 500 mg oral tablet                                 take

 1 tablet (500 mg) by oral route once daily 

for 7 days                               

 

                hydrochlorothiazide 25 mg oral tablet 2019                

      TAKE 1 TABLET BY MOUTH EVERY 

DAY                                      

 

                HYDROCHLOROTHIAZIDE 25MG TABLETS 12/10/2020      2021      

 TAKE 1 TABLET BY MOUTH 

EVERY DAY                                

 

                clopidogrel 75 mg oral tablet                                 ta

ke 1 tablet (75 mg) by oral route once daily

for 30 days                              

 

                Iron (ferrous sulfate) 325 mg (65 mg iron) oral tablet 2021       take 

1 tablet (325 mg) by oral route once daily for 90 days  

 

                isosorbide mononitrate 30 mg oral tablet extended release 24 hr 

                                take 1 

tablet (30 mg) by oral route once daily in the morning for 90 days  

 

                melatonin 3 mg oral tablet 2021       take 

1 tablet by oral route daily

for 90 days                              

 

                nitroglycerin 0.4 mg sublingual tablet, sublingual 2021       give 1 

tablet sublingually every 5min. as needed for chest pain times 3 doses, if not 
relieved go to ER                        

 

                pantoprazole 40 mg oral tablet,delayed release (DR/EC) 2021

                       TAKE 1 TABLET

BY MOUTH EVERY DAY                       

 

                Protonix 40 mg oral tablet,delayed release (DR/EC) 2021       take 1 

tablet (40 mg) by oral route once daily for 90 days  

 

             allopurinol 300 mg oral tablet 2021                 TAKE 1 TAB

LET BY MOUTH ONCE DAILY  

 

                FERROUS SULFATE 325MG (5GR) TABS 2021      

 TAKE 1 TABLET BY MOUTH 

EVERY DAY                                

 

                carvedilol 25 mg oral tablet 9/3/2021        2022        noemi

e 1 tablet (25 mg) by oral 

route every 12 hours with food for 30 days  

 

                amoxicillin 875 mg oral tablet 9/3/2021        9/10/2021       t

irvin 1 tablet (875 mg) by oral

route every 12 hours for 7 days          







                                         

 

             Name         Start Date   Expiration Date SIG          Comments

 

             Biotin Oral                                          

 

                Zithromax Z-Jones 250 mg oral tablet 5/3/2010        2010     

   take 2 tablets (500 mg) by 

oral route once daily for 1 day then 1 tablet (250 mg) by oral route once daily 
for 4 days                               

 

                Coreg 12.5 mg oral tablet 2010        1BID -

 TAKE ONE TABLET BY MOUTH 

TWICE DAILY                              

 

                ALLOPURINOL 300MG    each 2011        2/3/2011       

 1QD - TAKE ONE TABLET BY 

MOUTH EVERY DAY                          

 

                Edarbyclor 40-12.5 mg oral tablet 4/15/2013       2013    

  take 1 tablet by oral 

route once daily                         

 

                carvedilol 12.5 mg oral tablet 2013       T

IRVIN ONE TABLET BY MOUTH TWICE

DAILY                                    

 

                gemfibrozil 600 mg oral tablet 2013       T

IRVIN ONE TABLET BY MOUTH TWICE

DAILY (BEFORE  MORNING  AND  EVENING  MEALS)  

 

                losartan-hydrochlorothiazide 100-25 mg oral tablet 2013    

   10/16/2013      TAKE ONE

TABLET BY MOUTH IN THE MORNING           

 

                Levaquin 500 mg oral tablet 2013      take

 1 tablet (500 mg) by oral 

route once daily for 7 days              

 

                Sporanox 100 mg oral capsule                                 noemi

e 1 capsule (100 mg) by oral route once 

daily with food                          

 

                Sunscreen SPF 30 2015                       apply q2h while

 outdoors, q1h if swimming or 

sweating                                 

 

             Vaseline     2015                 apply to treated area  

 

                urea 40 % topical cream 2015                       apply to

 the affected area(s) by topical 

route once a day                         

 

                econazole 1 % topical cream 2015                       appl

y to the affected and surrounding 

areas of skin by topical route once daily  

 

                Zetia 10 mg oral tablet                                 take 1 t

ablet (10 mg) by oral route once daily for 

30 days                                  

 

                amoxicillin 875 mg oral tablet                                 t

irvin 1 tablet (875 mg) by oral route every 12

hours for 10 days, finish 16        

 

                Nexium 40 mg oral capsule,delayed release(DR/EC) 2016      

 10/24/2016      take 1 

capsule (40 mg) by oral route once daily for 4 weeks  

 

                Keflex 500 mg oral capsule 4/3/2017        2017       take 

1 capsule (500 mg) by oral 

route every 12 hours for 14 days         

 

                Cipro 500 mg oral tablet                                 take 1 

tablet (500 mg) by oral route every 12 hours

for 7 days                               

 

                Hyzaar 100-25 mg oral tablet 2018       noemi

e 1 tablet by oral route once

daily for 30 days                        

 

                Naprosyn 500 mg oral tablet 2018       take

 1 tablet (500 mg) by oral 

route 2 times per day with food for 14 days  

 

                Cipro 250 mg oral tablet                                 take 1 

tablet (250 mg) by oral route every 12 hours

for 5 days                               

 

                Zithromax 500 mg oral tablet 3/21/2019       3/28/2019       noemi

e 1 tablet (500 mg) by oral 

route once daily for 7 days              

 

                Carafate 1 gram oral tablet 2019       take

 1 tablet by oral route 4 

times a day for 14 days                  

 

                Suprep Bowel Prep Kit 17.5-3.13-1.6 gram oral recon soln 20

19                       take as 

directed                                 

 

                Augmentin 875-125 mg oral tablet 10/11/2019      10/18/2019     

 take 1 tablet by oral 

route every 12 hours for 7 days          

 

                gemfibrozil 600 mg oral tablet 2019        T

IRVIN 1 TABLET BY MOUTH TWICE 

DAILY BEFORE MORNING AND EVENING MEALS for 30 days  







                                        Discontinued 

 

             Name         Start Date   Discontinued Date SIG          Comments

 

                Vaseretic 10-25 mg oral tablet                 3/1/2011        t

irvin 1 tablet by oral route once daily

                                         

 

                Omnaris 50 mcg nasal spray,non-aerosol 2010 

       inhale 2 sprays (100 

mcg) in each nostril by intranasal route once daily  

 

                Mobic 15 mg oral tablet 2010       3/1/2011        take 1 t

ablet (15 mg) by oral route 

once daily                               

 

                Cipro 500 mg oral tablet                 2013        take 1 

tablet (500 mg) by oral route every 

12 hours for 7 days                      

 

                amoxicillin 500 mg oral capsule                 2013        

take 1 capsule (500 mg) by oral route

3 times per day x 7days                  

 

                Levaquin 500 mg oral tablet                 2013        take

 1 tablet (500 mg) by oral route once

daily for 7 days                         

 

                hydrocortisone 2.5 % topical cream 2014    

   apply to the affected 

area(s) by topical route once daily      

 

                losartan-hydrochlorothiazide 100-25 mg oral tablet 10/13/2014   

   2015        TAKE ONE 

TABLET BY MOUTH IN THE MORNING           

 

                Hyzaar 100-25 mg oral tablet 2017        noemi

e 1 tablet by oral route once

daily for 30 days                        

 

                Diflucan 150 mg oral tablet                 2018       take

 1 tablet (150 mg) by oral route 

once                                     

 

                Macrobid 100 mg oral capsule 10/12/2017      2018       noemi

e 1 capsule (100 mg) by 

oral route every 12 hours with food for 7 days  

 

                Cipro 500 mg oral tablet 10/17/2017      2018       take 1 

tablet (500 mg) by oral 

route every 12 hours for 7 days          

 

                Iron (ferrous sulfate) 325 mg (65 mg iron) oral tablet          

       2021       take 1 tablet

(325 mg) by oral route 2 times per day   

 

                Dexilant 60 mg oral capsule,biphase delayed releas 2019       take 1 

capsule (60 mg) by oral route once daily  







Problem List





                    Description         Status              Onset

 

                    Hypertension        Active               

 

                    Gout                Active               

 

                    Heart disease       Active               

 

                    Hyperlipidemia      Active               

 

                    Arthritis unspecified Active               

 

                    Actinic Keratosis   Active              2015

 

                    Onychomycosis       Active              2015

 

                    Seborrheic keratoses Active              2015

 

                    BCC (basal cell carcinoma of skin) Active              

 

                    Keratosis           Active              2017

 

                    Dyspepsia           Active              2019

 

                    Change in bowel habit Active              2019







Vital Signs





     Date Time BP-Sys(mm[Hg] BP-Ester(mm[Hg]) HR(bpm) RR(rpm) Temp WT   HT   HC   

BMI  BSA  BMI

 Percentile                             O2 Sat(%)

 

        9/3/2021 10:48:00  mm[Hg] 70 mm[Hg] 74 {beats}/min 20 rpm  98.1 F 

 161.5 lbs

           66 in                 26.0665 kg/m2 1.847 m2              97 %

 

        2021 10:04:00  mm[Hg] 72 mm[Hg] 70 {beats}/min 18 rpm  98.6 F

  150.25 

lbs        66 in                 24.25 kg/m2 1.78 m2               98 %

 

        10/11/2019 10:55:00  mm[Hg] 60 mm[Hg] 65 {beats}/min 18 rpm  97.9 

F  141 lbs

           66 in                 22.7578 kg/m2 1.7258 m2             98 %

 

        2019 1:58:00  mm[Hg] 72 mm[Hg] 65 {beats}/min 20 rpm  97 F    

145.5 lbs 66

 in                       23.48 kg/m2  1.75 m2                    

 

        2019 8:53:00  mm[Hg] 63 mm[Hg] 70 {beats}/min 70 rpm  97.5 F 

 145.5 lbs

           66 in                 23.4841 kg/m2 1.7531 m2              

 

        2019 8:47:00  mm[Hg] 63 mm[Hg] 75 {beats}/min 20 rpm  98.8 F 

 150 lbs 

66 in                     24.21 kg/m2  1.78 m2                    

 

        2019 9:02:00  mm[Hg] 78 mm[Hg] 65 {beats}/min 18 rpm  98.4 F 

 156.375 

lbs        67 in                 24.4915 kg/m2 1.8311 m2             98 %

 

        2019 9:50:00  mm[Hg] 68 mm[Hg] 68 {beats}/min 18 rpm  97.9 F 

 156 lbs 

67 in                     24.43 kg/m2  1.83 m2                   100 %

 

        2019 9:10:00  mm[Hg] 70 mm[Hg] 65 {beats}/min 18 rpm  97.2 F 

 158.5 lbs

           67 in                 24.8244 kg/m2 1.8435 m2             97 %

 

        3/21/2019 8:53:00  mm[Hg] 72 mm[Hg] 80 {beats}/min 20 rpm  97.5 F 

 154.375 

lbs        67 in                 24.18 kg/m2 1.82 m2               96 %

 

        2019 8:54:00  mm[Hg] 72 mm[Hg] 64 {beats}/min 14 rpm  97.9 F  

156 lbs 67

 in                       24.4328 kg/m2 1.8289 m2                 98 %

 

        2018 11:03:00  mm[Hg] 70 mm[Hg] 68 {beats}/min 16 rpm  98.1 

F  163 lbs

           67 in                 25.53 kg/m2 1.87 m2               98 %

 

        2018 8:39:00  mm[Hg] 74 mm[Hg] 65 {beats}/min 18 rpm  97.7 F  

171.25 lbs

           67 in                 26.8213 kg/m2 1.9163 m2             97 %

 

        2018 9:06:00  mm[Hg] 92 mm[Hg] 62 {beats}/min 16 rpm  96.1 F 

 169 lbs 

67 in                     26.47 kg/m2  1.90 m2                   97 %

 

        10/12/2017 9:18:00  mm[Hg] 70 mm[Hg] 61 {beats}/min 14 rpm  97.3 F

  168.25 

lbs        67 in                 26.3514 kg/m2 1.8994 m2             99 %

 

        2017 8:13:00  mm[Hg] 76 mm[Hg] 69 {beats}/min 18 rpm  97.3 F  

172 lbs 67

 in                       26.94 kg/m2  1.92 m2                   98 %

 

        2017 11:06:00  mm[Hg] 98 mm[Hg] 62 {beats}/min 16 rpm  96.9 F

  178 lbs 

67 in                     27.8785 kg/m2 1.9537 m2                 9 %

 

        2017 10:35:00  mm[Hg] 88 mm[Hg] 67 {beats}/min 20 rpm  97.8 F 

 179 lbs 

67 in                     28.04 kg/m2  1.96 m2                    

 

        3/24/2017 9:50:00  mm[Hg] 88 mm[Hg] 67 {beats}/min 20 rpm  97.8 F 

 179 lbs 

67 in                     28.0351 kg/m2 1.9591 m2                  

 

        3/1/2017 9:25:00  mm[Hg] 78 mm[Hg] 62 {beats}/min 17 rpm  97.6 F  

180 lbs 67

 in                       28.19 kg/m2  1.96 m2                   97 %

 

        2016 10:56:00  mm[Hg] 88 mm[Hg] 60 {beats}/min 18 rpm  97.6 F

  179 lbs 

67 in                     28.0351 kg/m2 1.9591 m2                 96 %

 

        2016 8:24:00  mm[Hg] 70 mm[Hg] 72 {beats}/min 18 rpm  97.5 F 

 174 lbs 

67 in                     27.25 kg/m2  1.93 m2                   99 %

 

        2015 1:40:00  mm[Hg] 72 mm[Hg] 57 {beats}/min 18 rpm  98.5 F  

191.25 lbs

           67 in                 29.9537 kg/m2 2.0251 m2             96 %

 

        2014 2:11:00  mm[Hg] 64 mm[Hg] 74 {beats}/min 20 rpm  97.6 F 

 205 lbs 

67 in                     32.11 kg/m2  2.10 m2                    

 

        2014 9:28:00  mm[Hg] 80 mm[Hg] 72 {beats}/min 18 rpm  96.8 F 

 203 lbs 

67 in                     31.794 kg/m2 2.0863 m2                  

 

        10/7/2013 2:22:00  mm[Hg] 75 mm[Hg] 70 {beats}/min 18 rpm  98 F   

 206 lbs 67 

in                        32.26 kg/m2  2.10 m2                   95 %

 

        2013 9:42:00  mm[Hg] 72 mm[Hg] 68 {beats}/min 18 rpm  97.4 F  

205 lbs 67

 in                       32.1072 kg/m2 2.0966 m2                  

 

        3/15/2013 10:41:00  mm[Hg] 70 mm[Hg] 60 {beats}/min 18 rpm  97.2 F

  201 lbs 

66 in                     32.44 kg/m2  2.06 m2                    

 

        2012 1:46:00  mm[Hg] 78 mm[Hg] 60 {beats}/min 18 rpm  97.2 F

  203 lbs 

                                                                  

 

        2012 8:48:00  mm[Hg] 80 mm[Hg] 62 {beats}/min 18 rpm  97 F   

 207 lbs 66 

in                        33.41 kg/m2  2.091 m2                   

 

        3/31/2011 9:58:00  mm[Hg] 82 mm[Hg] 68 {beats}/min 20 rpm  97.2 F 

 203 lbs  

                                                                  

 

      3/1/2011 9:05:00  mm[Hg] 74 mm[Hg] 64 {beats}/min 20 rpm 97 F  203 l

bs             

                                                             

 

      2010 9:03:00  mm[Hg] 72 mm[Hg] 58 {beats}/min 20 rpm       203 

lbs                   

                                                     

 

        2010 8:40:00  mm[Hg] 88 mm[Hg] 68 {beats}/min 18 rpm  96.9 F 

 209 lbs  

                                                                  







Social History





                    Name                Description         Comments

 

                    denies alcohol use                       

 

                    Lives with spouse                        

 

                    Tobacco             Never smoker         







History of Procedures





                    Date Ordered        Description         Order Status

 

                    2016 12:00 AM  COMPLETE CBC W/AUTO DIFF WBC Reviewed

 

                    2016 12:00 AM  COMPREHEN METABOLIC PANEL Reviewed

 

                    2016 12:00 AM  LIPID PANEL         Reviewed

 

                    2016 12:00 AM  GLYCOSYLATED HEMOGLOBIN TEST Reviewed

 

                    2012 12:00 AM  COMPLETE CBC W/AUTO DIFF WBC Reviewed

 

                    2012 12:00 AM  COMPREHEN METABOLIC PANEL Reviewed

 

                    2012 12:00 AM  LIPID PANEL         Reviewed

 

                    2012 12:00 AM  GLYCOSYLATED HEMOGLOBIN TEST Reviewed

 

                    2012 12:00 AM  ASSAY THYROID STIM HORMONE Reviewed

 

                    2012 12:00 AM  ASSAY OF BLOOD/URIC ACID Reviewed

 

                    2012 12:00 AM  URINALYSIS AUTO W/SCOPE Reviewed

 

                    3/1/2017 12:00 AM   COMPLETE CBC W/AUTO DIFF WBC Reviewed

 

                    3/1/2017 12:00 AM   COMPREHEN METABOLIC PANEL Reviewed

 

                    3/1/2017 12:00 AM   LIPID PANEL         Reviewed

 

                    3/1/2017 12:00 AM   ASSAY THYROID STIM HORMONE Reviewed

 

                    3/1/2017 12:00 AM   VITAMIN B-12        Reviewed

 

                    3/24/2017 12:00 AM  CMPLX RPR F/C/C/M/N/AX/G/H/F Reviewed

 

                    2017 12:00 AM   COMPLETE CBC W/AUTO DIFF WBC Returned

 

                    2017 12:00 AM   COMPREHEN METABOLIC PANEL Returned

 

                    2017 12:00 AM   ASSAY THYROID STIM HORMONE Returned

 

                    2017 12:00 AM   VITAMIN B-12        Returned

 

                    2017 12:00 AM   ASSAY OF BLOOD/URIC ACID Returned

 

                    2017 12:00 AM   X-RAY EXAM OF HAND  Returned

 

                    2017 12:00 AM   ASSAY OF PREALBUMIN Returned

 

                    10/12/2017 12:00 AM URINE CULTURE/COLONY COUNT Reviewed

 

                    10/12/2017 9:44 AM  URINALYSIS AUTO W/O SCOPE Reviewed

 

                    2018 12:00 AM  COMPLETE CBC W/AUTO DIFF WBC Returned

 

                    2018 12:00 AM  COMPREHEN METABOLIC PANEL Returned

 

                    2018 12:00 AM  LIPID PANEL         Returned

 

                    2018 12:00 AM  GLYCOSYLATED HEMOGLOBIN TEST Returned

 

                    2018 12:00 AM  ASSAY OF BLOOD/URIC ACID Returned

 

                    2018 12:00 AM  ASSAY OF URINE/URIC ACID Returned

 

                    3/4/2013 12:00 AM   CHEST X-RAY 2VW FRONTAL&LATL Reviewed

 

                    2010 12:00 AM  COMPLETE CBC W/AUTO DIFF WBC Reviewed

 

                    2010 12:00 AM  COMPREHEN METABOLIC PANEL Reviewed

 

                    2010 12:00 AM  LIPID PANEL         Reviewed

 

                    2010 12:00 AM  ASSAY OF BLOOD/URIC ACID Reviewed

 

                    2010 12:00 AM  GLYCOSYLATED HEMOGLOBIN TEST Reviewed

 

                    2018 12:00 AM CHEST X-RAY 2VW FRONTAL&LATL Returned

 

                    2018 12:00 AM COMPLETE CBC W/AUTO DIFF WBC Returned

 

                    2018 12:00 AM COMPREHEN METABOLIC PANEL Returned

 

                    2018 12:00 AM ASSAY OF TROPONIN QUANT Returned

 

                    2013 12:00 AM   COMPLETE CBC W/AUTO DIFF WBC Reviewed

 

                    2013 12:00 AM   COMPREHEN METABOLIC PANEL Reviewed

 

                    2013 12:00 AM   GLYCOSYLATED HEMOGLOBIN TEST Reviewed

 

                    2019 12:00 AM  METABOLIC PANEL TOTAL CA Returned

 

                    2019 12:00 AM  CT ABD & PELV W/CONTRAST Returned

 

                    2019 12:00 AM   US EXAM ABDO BACK WALL COMP Returned

 

                    2019 3:40 PM   URINALYSIS AUTO W/O SCOPE Reviewed

 

                    2019 12:00 AM  COMPLETE CBC W/AUTO DIFF WBC Reviewed

 

                    2019 12:00 AM  COMPREHEN METABOLIC PANEL Reviewed

 

                    2019 12:00 AM  ELECTROCARDIOGRAM TRACING Reviewed

 

                    2019 12:00 AM  ASSAY OF TROPONIN QUANT Reviewed

 

                    2019 12:00 AM  FECES CULTURE AEROBIC BACT Reviewed

 

                    2019 12:00 AM  C DIFF AMPLIFIED PROBE Reviewed

 

                    2019 12:00 AM  Tick Panel          Reviewed

 

                    2019 12:00 AM  METABOLIC PANEL TOTAL CA Returned

 

                    2019 12:00 AM  COMPLETE CBC W/AUTO DIFF WBC Returned

 

                    2019 12:00 AM  VITAMIN B-12        Returned

 

                    2019 12:00 AM  ASSAY OF IRON       Returned

 

                    6/10/2019 12:00 AM  ECHO EXAM OF ABDOMEN Reviewed

 

                    6/10/2019 12:00 AM  HEPATOBILIARY SYSTEM IMAGING Reviewed

 

                    2019 12:00 AM  METABOLIC PANEL TOTAL CA Returned

 

                    2019 12:00 AM  ASSAY OF IRON       Returned

 

                    10/11/2019 12:00 AM CT THORAX W/DYE     Returned

 

                    10/14/2019 12:00 AM METABOLIC PANEL TOTAL CA Returned

 

                    2020 12:00 AM   COMPLETE CBC W/AUTO DIFF WBC Returned

 

                    2020 12:00 AM   COMPREHEN METABOLIC PANEL Returned

 

                    2020 12:00 AM   LIPID PANEL         Returned

 

                    2020 12:00 AM   CT THORAX W/DYE     Returned

 

                    2021 12:00 AM  COMPLETE CBC W/AUTO DIFF WBC Returned

 

                    2021 12:00 AM  COMPREHEN METABOLIC PANEL Returned

 

                    2021 12:00 AM  ASSAY THYROID STIM HORMONE Returned

 

                    2021 12:00 AM  ASSAY OF TROPONIN QUANT Returned

 

                    2021 12:00 AM  ASSAY OF BLOOD/URIC ACID Returned

 

                    2021 12:00 AM  ASSAY OF IRON       Returned

 

                    2021 12:00 AM  IRON BINDING TEST   Returned

 

                    2021 12:00 AM  ASSAY OF TRANSFERRIN Returned

 

                    2021 12:00 AM  VITAMIN B-12        Returned

 

                    2021 12:00 AM  COMPLETE CBC W/AUTO DIFF WBC Returned

 

                    2021 12:00 AM  METABOLIC PANEL TOTAL CA Returned

 

                    2/15/2011 12:00 AM  ROUTINE VENIPUNCTURE Reviewed

 

                    2/15/2011 12:00 AM  COMPREHEN METABOLIC PANEL Reviewed

 

                    2/15/2011 12:00 AM  LIPID PANEL         Reviewed

 

                    2/15/2011 12:00 AM  ASSAY OF BLOOD/URIC ACID Reviewed

 

                    2010 12:00 AM  TTE W/O DOPPLER COMPLETE Reviewed

 

                    2015 12:00 AM  DESTRUCT PREMALG LESION Reviewed

 

                    2015 12:00 AM   COMPLETE CBC W/AUTO DIFF WBC Reviewed

 

                    2015 12:00 AM   COMPREHEN METABOLIC PANEL Reviewed

 

                    2015 12:00 AM   LIPID PANEL         Reviewed

 

                    2015 12:00 AM   GLYCOSYLATED HEMOGLOBIN TEST Reviewed

 

                    2015 12:00 AM   ASSAY OF BLOOD/URIC ACID Reviewed







Results Summary





                          Date and Description      Results

 

                          2010 9:45 AM         GLYCOHEMOGLOBIN A1C 6.30 %UR

IC ACID 5.3 mg/dLTRIGLYCERIDES 

90.0 mg/dLCHOLESTEROL 209.0 mg/dLHDL 43.0 mg/dLTOT CHOL/HDL 4.9 LDL (CALC) 148.0
 mg/dLWBC 10.0 RBC 4.75 HGB 14.10 g/dLHCT 41.0 %MCV 86.0 fLMCH 29.70 pgMCHC 
34.40 g/dLRDW SD 45 RDW CV 14.50 %MPV 10.40 fLPLT 354 NRBC# 0.00 NRBC% 0.0 %NEUT
 63.60 %%LYMP 21.60 %%MONO 5.90 %%EOS 8.30 %%BASO 0.60 %#NEUT 6.35 #LYMP 2.16 
#MONO 0.59 #EOS 0.83 #BASO 0.06 MANUAL DIFF NOT IND GLUCOSE 112.0 mg/dLSODIUM 
142.0 mmol/LPOTASSIUM 3.90 mmol/LCHLORIDE 103.0 mmol/LCO2 28.0 mmol/LBUN 20.0 
mg/dLCREATININE 0.90 mg/dLSGOT/AST 23.0 IU/LSGPT/ALT 13.0 IU/LALK PHOS 75.0 
IU/LTOTAL PROTEIN 6.90 g/dLALBUMIN 4.30 g/dLTOTAL BILI 0.50 mg/dLCALCIUM 9.0 
mg/dLAGE 70 GFR NonAA 62 GFR AA 75 eGFR >60 mL/min/1.73 m2eGFR AA* >60 

 

                          2/15/2011 9:15 AM         TRIGLYCERIDES 97.0 mg/dLCHOL

ESTEROL 212.0 mg/dLHDL 48.0 

mg/dLTOT CHOL/HDL 4.4 LDL (CALC) 145.0 mg/dLURIC ACID 5.3 mg/dLGLUCOSE 110.0 
mg/dLSODIUM 141.0 mmol/LPOTASSIUM 4.10 mmol/LCHLORIDE 102.0 mmol/LCO2 29.0 
mmol/LBUN 18.0 mg/dLCREATININE 0.90 mg/dLSGOT/AST 31.0 IU/LSGPT/ALT 17.0 IU/LALK
 PHOS 74.0 IU/LTOTAL PROTEIN 7.60 g/dLALBUMIN 4.30 g/dLTOTAL BILI 0.30 
mg/dLCALCIUM 10.20 mg/dLAGE 71 GFR NonAA 62 GFR AA 75 eGFR >60 mL/min/1.73 
m2eGFR AA* >60 

 

                          2012 9:55 AM         WBC 8.7 RBC 4.63 HGB 13.40 g

/dLHCT 40.70 %MCV 88.0 fLMCH 28.90

 pgMCHC 32.90 g/dLRDW SD 47 RDW CV 14.70 %MPV 10.20 fLPLT 388 NRBC# 0.00 NRBC% 
0.0 %NEUT 59.90 %%LYMP 22.40 %%MONO 5.60 %%EOS 11.50 %%BASO 0.60 %#NEUT 5.20 
#LYMP 1.94 #MONO 0.49 #EOS 1.00 #BASO 0.05 MANUAL DIFF NOT IND GLUCOSE 114.0 
mg/dLSODIUM 142.0 mmol/LPOTASSIUM 3.80 mmol/LCHLORIDE 104.0 mmol/LCO2 28.0 
mmol/LBUN 23.0 mg/dLCREATININE 0.90 mg/dLSGOT/AST 20.0 IU/LSGPT/ALT 15.0 IU/LALK
 PHOS 69.0 IU/LTOTAL PROTEIN 7.20 g/dLALBUMIN 4.30 g/dLTOTAL BILI 0.30 
mg/dLCALCIUM 9.90 mg/dLAGE 72 GFR NonAA 62 GFR AA 75 eGFR >60 mL/min/1.73 m2eGFR
 AA* >60 TRIGLYCERIDES 94.0 mg/dLCHOLESTEROL 217.0 mg/dLHDL 43.0 mg/dLTOT 
CHOL/HDL 5.0 LDL (CALC) 155.0 mg/dLURIC ACID 5.6 mg/dLTSH 1.920 
uIU/mLGLYCOHEMOGLOBIN A1C 6.30 %

 

                          2012 2:42 PM         COLOR YELLOW APPEARANCE HAZY

 SPEC GRAV 1.025 pH 5.5 PROTEIN 

NEGATIVE GLUCOSE NEGATIVE KETONE NEGATIVE BILIRUBIN NEGATIVE BLOOD TRACE-INTACT 
NITRITE NEGATIVE LEUK SCREEN MODERATE WBC/HPF  RBC/HPF 0-5 CASTS/LPF 
NEGATIVE CRYSTALS NEGATIVE MUCOUS THRDS NEGATIVE BACTERIA 1+ EPITH CELLS 1+ 
RENAL TRICHOMONAS NEGATIVE YEAST NEGATIVE CULT SET UP? YES 

 

                          2013 10:48 AM         WBC 10.3 RBC 4.50 HGB 13.30 

g/dLHCT 39.10 %MCV 87.0 fLMCH 

29.60 pgMCHC 34.0 g/dLRDW SD 46 RDW CV 14.60 %MPV 9.80 fLPLT 354 NRBC# 0.00 
NRBC% 0.0 %NEUT 63.60 %%LYMP 23.40 %%MONO 4.90 %%EOS 7.70 %%BASO 0.40 %#NEUT 
6.56 #LYMP 2.42 #MONO 0.51 #EOS 0.79 #BASO 0.04 MANUAL DIFF NOT IND HGB A1C 6.20
 %Est Avg Glucose 131.2 mg/dLGLUCOSE 90.0 mg/dLSODIUM 142.0 mmol/LPOTASSIUM 3.80
 mmol/LCHLORIDE 102.0 mmol/LCO2 29.0 mmol/LBUN 19.0 mg/dLCREATININE 0.90 
mg/dLSGOT/AST 24.0 IU/LSGPT/ALT 17.0 IU/LALK PHOS 77.0 IU/LTOTAL PROTEIN 7.30 
g/dLALBUMIN 4.20 g/dLTOTAL BILI 0.40 mg/dLCALCIUM 10.60 mg/dLAGE 73 GFR NonAA 62
 GFR AA 75 eGFR 60 eGFR AA* 60 

 

                          4/10/2015 9:58 AM         WBC 8.5 RBC 4.40 HGB 12.90 g

/dLHCT 39.40 %MCV 90.0 fLMCH 29.30

 pgMCHC 32.70 g/dLRDW SD 47 RDW CV 14.40 %MPV 9.90 fLPLT 393 NRBC# 0.00 NRBC% 
0.0 %NEUT 56.0 %%LYMP 25.60 %%MONO 5.40 %%EOS 12.50 %%BASO 0.50 %#NEUT 4.75 
#LYMP 2.17 #MONO 0.46 #EOS 1.06 #BASO 0.04 MANUAL DIFF NOT IND GLUCOSE 123.0 
mg/dLSODIUM 142.0 mmol/LPOTASSIUM 4.10 mmol/LCHLORIDE 105.0 mmol/LCO2 26.0 
mmol/LBUN 22.0 mg/dLCREATININE 1.10 mg/dLSGOT/AST 20.0 IU/LSGPT/ALT 15.0 IU/LALK
 PHOS 94.0 IU/LTOTAL PROTEIN 7.70 g/dLALBUMIN 4.20 g/dLTOTAL BILI 0.30 
mg/dLCALCIUM 10.50 mg/dLAGE 75 GFR NonAA 49 GFR AA 59 eGFR 49 eGFR AA* 59 
TRIGLYCERIDES 111.0 mg/dLCHOLESTEROL 201.0 mg/dLHDL 35.0 mg/dLTOT CHOL/HDL 5.7 
LDL (CALC) 144.0 mg/dLURIC ACID 4.7 mg/dLHGB A1C 6.30 %Est Avg Glucose 134.1 
mg/dL

 

                          2016 8:47 AM         WBC 7.8 RBC 4.67 HGB 13.50 g

/dLHCT 40.90 %MCV 88.0 fLMCH 28.90

 pgMCHC 33.0 g/dLRDW SD 46 RDW CV 14.50 %MPV 10.30 fLPLT 360 NRBC# 0.00 NRBC% 
0.0 %NEUT 59.50 %%LYMP 25.90 %%MONO 6.10 %%EOS 7.90 %%BASO 0.60 %#NEUT 4.65 
#LYMP 2.03 #MONO 0.48 #EOS 0.62 #BASO 0.05 MANUAL DIFF NOT IND GLUCOSE 115.0 
mg/dLSODIUM 144.0 mmol/LPOTASSIUM 3.90 mmol/LCHLORIDE 99.0 mmol/LCO2 30.0 
mmol/LBUN 30.0 mg/dLCREATININE 1.30 mg/dLSGOT/AST 19.0 IU/LSGPT/ALT 14.0 IU/LALK
 PHOS 96.0 IU/LTOTAL PROTEIN 6.90 g/dLALBUMIN 4.40 g/dLTOTAL BILI 0.40 
mg/dLCALCIUM 10.80 mg/dLAGE 76 GFR NonAA 40 GFR AA 48 eGFR 40 eGFR AA* 48 
TRIGLYCERIDES 127.0 mg/dLCHOLESTEROL 204.0 mg/dLHDL 42.0 mg/dLTOT CHOL/HDL 4.9 
LDL (CALC) 137.0 mg/dLHemoglobin A1c 6.30 %Estim. Avg Glu (eAG) 134 

 

                          3/1/2017 9:48 AM          GLUCOSE 101.0 mg/dLSODIUM 14

3.0 mmol/LPOTASSIUM 4.10 

mmol/LCHLORIDE 108.0 mmol/LCO2 25.0 mmol/LBUN 20.0 mg/dLCREATININE 1.20 
mg/dLSGOT/AST 19.0 IU/LSGPT/ALT 10.0 IU/LALK PHOS 96.0 IU/LTOTAL PROTEIN 7.60 
g/dLALBUMIN 4.0 g/dLTOTAL BILI 0.30 mg/dLCALCIUM 10.0 mg/dLAGE 77 GFR NonAA 44 
GFR AA 53 eGFR 44 eGFR AA* 53 TRIGLYCERIDES 61.0 mg/dLCHOLESTEROL 190.0 mg/dLHDL
 40.0 mg/dLTOT CHOL/HDL 4.8 LDL (CALC) 138.0 mg/dLWBC 10.8 RBC 4.50 HGB 13.10 
g/dLHCT 38.90 %MCV 86.0 fLMCH 29.10 pgMCHC 33.70 g/dLRDW SD 45 RDW CV 14.40 %MPV
 9.70 fLPLT 341 NRBC# 0.00 NRBC% 0.0 %NEUT 66.20 %%LYMP 15.80 %%MONO 4.80 %%EOS 
12.30 %%BASO 0.50 %#NEUT 7.19 #LYMP 1.71 #MONO 0.52 #EOS 1.33 #BASO 0.05 MANUAL 
DIFF NOT IND TSH 1.950 uIU/mLVITAMIN B12 331.0 pg/mL

 

                          10/12/2017 9:44 AM        Color Ur lt. yellow Glucose 

Ur-sCnc neg Bilirub Ur Ql Strip 

neg Ketones Ur Ql Strip neg Sp Gr Ur Qn 1.015 Hgb Ur Ql Strip neg pH Ur-LsCnc 
5.5 Prot Ur Ql Strip trace Urobilinogen Ur-mCnc 0.2eu/dl Nitrite Ur Ql Strip neg
 WBC Est Ur Ql Strip large 

 

                          2018 2:05 PM        Falls in last 6 months? No U

nsteady or worry about falling? 

Yes Fall Risk Assessment At Risk 

 

                          2019 10:44 AM        WBC 8.0 RBC 4.11 HGB 10.70 g

/dLHCT 34.60 %MCV 84.0 fLMCH 26.0

 pgMCHC 30.90 g/dLRDW SD 48 fLRDW CV 15.70 %MPV 9.90 fLPLT 517 NRBC# 0.00 NRBC% 
0.0 %NEUT 75.1 %LYMP 14.4 %MONO 5.9 %EOS 3.9 %BASO 0.4 #NEUT 6.02 #LYMP 1.15 
#MONO 0.47 #EOS 0.31 #BASO 0.03 MANUAL DIFF NOT IND GLUCOSE 101 SODIUM 142 
POTASSIUM 3.9 CHLORIDE 107.0 mmol/LCO2 27 BUN 18.0 mg/dLCREATININE 1.040 
mg/dLSGOT/AST 13 SGPT/ALT 6 ALK PHOS 111 TOTAL PROTEIN 7.5 ALBUMIN 3.5 TOTAL 
BILI 0.2 CALCIUM 10.20 mg/dLAGE 79 GFR NonAA 51 GFR AA 62 eGFR 51 eGFR AA* >60 
mL/min/1.73 [id number] AD 0.06 TOXIGENIC C DIFF NEGATIVE 027 NAP1 STRAIN 
PRESUMPTIVE NEGATIVE Lyme IgG/IgM Ab <0.91 Lyme Disease Ab, Quant,IgM WILL 
FOLLOW RMSF, IgG, EIA WILL FOLLOW [facility] Spotted Fever,IgM WILL FOLLOW E. 
chaffeensis (HME) IgGTiter WILL FOLLOW E. chaffeensis ([facility]ME) IgMTiter WILL FOLLOW
 

 

                          2019 3:40 PM         Color Ur lt. yellow Glucose 

Ur-sCnc neg Bilirub Ur Ql Strip 

neg Ketones Ur Ql Strip neg Sp Gr Ur Qn 1.010 Hgb Ur Ql Strip neg pH Ur-LsCnc 
5.0 Prot Ur Ql Strip 100mg/dl Urobilinogen Ur-mCnc 0.2eu/dl Nitrite Ur Ql Strip 
neg WBC Est Ur Ql Strip neg 







History Of Immunizations





      Name  Date Admin Mfg Name Mfg Code Trade Name Lot#  Route Inj   Vis Given 

Vis Pub 

CVX

 

        Pneumococcal 10/1/2019 Not Entered NE      PREVNAR 13 [id number]  Intramuscu

lar Left Arm 

10/1/2019                 1                  133

 

        Influenza 10/1/2019 Not Entered NE      FLUZONE-HIGH DOSE [id number] Intram

uscular Left 

Arm                 10/1/2019           1            135

 

        Influenza 2020 Not Entered NE      FLUZONE-HIGH DOSE         Not Ent

ered Not Entered 

1                  135







History of Past Illness





                    Name                Date of Onset       Comments

 

                    Hypertension        2010  8:42AM  

 

                    Hyperlipidemia, unspecified 2010  8:42AM  

 

                    Arthritis unspecified                      

 

                    Heart disease                            

 

                    Hypertension                             

 

                    Hyperlipidemia                           

 

                    Gout                                     

 

                    Hypertension        2010  9:09AM  

 

                    Gout                2010  9:09AM  

 

                    Osteoarthritis      2010  9:09AM  

 

                    Hyperglycemia       2010  9:09AM  

 

                    Onychodystrophy                          

 

                    Contact Dermatitis                       

 

                    Actinic Keratosis   2015           

 

                    Onychomycosis       2015           

 

                    Seborrheic keratoses 2015           

 

                    Coronary Artery Disease Feb 15 2011  9:03AM  

 

                    Hypertension        Feb 15 2011  9:03AM  

 

                    Hyperlipidemia      Feb 15 2011  9:03AM  

 

                    Gout                Feb 15 2011  9:03AM  

 

                    Hypertension        Mar  1 2011  9:06AM  

 

                    Hyperlipidemia, unspecified Mar  1 2011  9:06AM  

 

                    Gout                Mar  1 2011  9:06AM  

 

                    Hypertension        Mar 31 2011 10:01AM  

 

                    Headache            Mar 31 2011 10:01AM  

 

                    BCC (basal cell carcinoma of skin) 2017           

 

                    Keratosis           2017           

 

                    Dyspepsia           2019           

 

                    Change in bowel habit 2019           

 

                    Hypertension        2012  8:54AM  

 

                    Hyperlipidemia, unspecified 2012  8:54AM  

 

                    Diabetes Mellitus, Type II 2012  8:54AM  

 

                    Gout                2012  8:54AM  

 

                    Night sweats        2012  8:54AM  

 

                    Dysuria             May 21 2012  2:12PM  

 

                    Chest Pain          Dec 13 2012  1:51PM  

 

                    Cough               Mar  4 2013 11:11AM  

 

                    Hypertension        Mar 15 2013 10:45AM  

 

                    Hypertension        Aug  9 2013  9:47AM  

 

                    Hyperglycemia       Aug  9 2013  9:47AM  

 

                    Onychomycosis of Fingernail Oct  7 2013  2:24PM  

 

                    Onychomycosis of Fingernail Oct  8 2013  9:23AM  

 

                    Onychomycosis of Fingernail 2014  9:35AM  

 

                    Atopic Dermatitis   2014  9:35AM  

 

                    Lung Nodule         2014  9:35AM  

 

                    Hypertension        2014  9:35AM  

 

                    Hypertension        2014  2:16PM  

 

                    Onychomycosis of Fingernail 2014  2:16PM  

 

                    Gout                2014  2:16PM  

 

                    Actinic keratosis   2015  2:14PM  

 

                    Onychomycosis       2015  2:14PM  

 

                    Seborrheic keratoses 2015  2:14PM  

 

                    Hypertension        2015  1:45PM  

 

                    Gout                2015  1:45PM  

 

                    Hyperglycemia       2015  1:45PM  

 

                    Hyperlipidemia, unspecified 2016  8:25AM  

 

                    Hyperglycemia       2016  8:25AM  

 

                    Esophageal Reflux   Sep 26 2016 11:00AM  

 

                    Hyperlipidemia, unspecified Mar  1 2017  9:27AM  

 

                    Fatigue             Mar  1 2017  9:27AM  

 

                    BCC (basal cell carcinoma of skin) Mar 24 2017 10:21AM  

 

                    BCC (basal cell carcinoma of skin) Mar 24 2017 10:21AM  

 

                    Keratosis           Mar 24 2017 10:21AM  

 

                    Hypertension        2017 11:14AM  

 

                    Hypertension        2017  8:16AM  

 

                    Gout                2017  8:16AM  

 

                    Fatigue             2017  8:16AM  

 

                    Weight loss         2017  8:16AM  

 

                    Acute cystitis without hematuria Oct 12 2017  9:21AM  

 

                    Hypertension        2018  9:14AM  

 

                    Hyperglycemia       2018  9:14AM  

 

                    Gout                2018  9:14AM  

 

                    Hypertension        May  4 2018  8:41AM  

 

                    TMJ syndrome        May  4 2018  8:41AM  

 

                    Chest pain          2018 11:10AM  

 

                    Dyspnea             2018 11:10AM  

 

                    Risk for falls      Dec 18 2018  2:05PM  

 

                    Gastritis           2019  9:00AM  

 

                    Upper Respiratory Infection Mar 21 2019  8:56AM  

 

                    IBS (irritable bowel syndrome) 2019  9:13AM  

 

                    Irritable bowel syndrome (IBS) 2019  3:27PM  

 

                    Blood tests prior to treatment or procedure 2019  3:2

7PM  

 

                    Renal cyst          May  8 2019 12:28PM  

 

                    Shortness Of Breath May 16 2019  9:52AM  

 

                    Chest pain          May 16 2019  9:52AM  

 

                    Diarrhea            May 16 2019  9:52AM  

 

                    GERD without esophagitis May 16 2019  9:52AM  

 

                                        Bitten or stung by nonvenomous insect an

d other nonvenomous arthropods, initial 

encounter                 May 16 2019  9:52AM        

 

                    Hypertension        May 21 2019  9:06AM  

 

                    Anemia              May 21 2019  9:06AM  

 

                    Gastritis           May 21 2019  9:06AM  

 

                    Change in bowel habit May 28 2019  8:40AM  

 

                    Dyspepsia           May 28 2019  8:40AM  

 

                    Abdominal Pain      [place] 10 2019 12:24PM  

 

                    Dyspepsia           [place] 10 2019 12:24PM  

 

                    Anemia, Iron Deficiency 2019 11:09AM  

 

                    Hypokalemia         2019 11:09AM  

 

                    Dyspepsia           2019  2:17PM  

 

                    Diarrhea            2019  2:17PM  

 

                    Biliary dyskinesia  2019  2:17PM  

 

                    Basal cell carcinoma Aug  7 2019  1:58PM  

 

                    Sebaceous cyst      Aug  7 2019  1:58PM  

 

                    Basal cell carcinoma of skin, site unspecified Aug  7 2019  

1:59PM  

 

                    Dyspnea             Oct 11 2019 10:58AM  

 

                    Chest pain          Oct 11 2019 10:58AM  

 

                    Blood tests prior to treatment or procedure Oct 14 2019 12:1

3PM  

 

                    Coronary Artery Disease 2020  3:09PM  

 

                    Hypertension        2020  3:09PM  

 

                    Hyperlipidemia      2020  3:09PM  

 

                    Pneumonia           2020  3:12PM  

 

                    Abnormal CT scan, chest 2020  3:12PM  

 

                    Anemia              2021 10:51AM  

 

                    Fatigue             2021 10:51AM  

 

                    Hypertension        2021 10:03AM  

 

                    Chest pain          2021 10:03AM  

 

                    CAD (coronary artery disease) 2021 10:03AM  

 

                    Gout                2021 10:03AM  

 

                    Anemia, Iron Deficiency 2021  3:28PM  

 

                    Anemia              Sep  3 2021 11:24AM  

 

                    Fatigue             Sep  3 2021 11:24AM  

 

                    CAD (coronary artery disease) Sep  3 2021 11:24AM  

 

                    Dyspnea             Sep  3 2021 11:24AM  

 

                    Cellulitis          Sep  3 2021 10:48AM  







Payers





           Insurance Name Company Name Plan Name  Plan Number Policy Number Antony

cy Group 

Number                                  Start Date

 

                    Medicare RHC Medicare RHC           [id number]           N/

A

 

                    AAR      AAR                [id number]           N/A

 

                    Railroad Medicare Railroad Medicare           [id number]   

        

 

                    BCBS      [employer] [place]           [id number]           N/

A

 

                    Railroad Medicare - Railroad Railroad Medicare           [id number]

[id number]           N/A

 

                    Medicare Part A Medicare Part A           [id number]       

    N/A

 

                    Medicare Part A Medicare - Lab/Xray           [id number]   

        N/A

 

                    Medicare RHC Medicare RH           [id number]           N/

A

 

                    Medicare Part B Medicare Max Alvarez           [id number]    

       N/A







History of Encounters





                    Visit Date          Visit Type          Provider

 

                    9/3/2021            Office visit        Joe Conley MD

 

                    2021           Office visit        Joe Conley MD

 

                    10/11/2019          Office visit        Joe Conley MD

 

                    2019           Procedures          Mike Botom DO

 

                    2019            Procedures          Mike Mantilla DO

 

                    2019           Surgery             Mike Bouman DO

 

                    2019           [facility]            CHERISE Castillo MD

 

                    2019           Surgery             Mike Bouman DO

 

                    6/10/2019           Surgery             Mike Bouman DO

 

                    2019           Office visit        Mike Mantilla DO

 

                    2019           Office visit        Joe Conley MD

 

                    2019           Office visit        Ailyn BEARD

 

                    2019           Office visit        Joe Conley MD

 

                    3/21/2019           Office visit        Joe Conley MD

 

                    2019            Office visit        Joe Conley MD

 

                    2019            [facility]            CHERISE Castillo MD

 

                    2018          Office visit        Joe Conley MD

 

                    2018            Office visit        Joe Cnoley MD

 

                    2018           Office visit        Jeo Conley MD

 

                    10/12/2017          Office visit        Ailyn BEARD

 

                    2017            Office visit        Joe Conley MD

 

                    2017           Office visit        Joe Conley MD

 

                    2017            Procedures          Mike Mantilla DO

 

                    3/24/2017           Procedures          Mike Mantilla DO

 

                    3/1/2017            Office visit        Joe Conley MD

 

                    2016           Office visit        Joe Conley MD

 

                    2016           Office visit        Joe Conley MD

 

                    2015            Office visit        Joe Conley MD

 

                    2015           Office visit        ADIS SAGASTUME

 

                    2014           Office visit        ADIS SAGASTUME

 

                    2014           Office visit        Joe Conley MD

 

                    2014           Office visit        Joe Conley MD

 

                    10/7/2013           Office visit        Joe Conley MD

 

                    2013            Office visit        Joe Conley MD

 

                    3/15/2013           Office visit        Joe Conley MD

 

                    2012          Office visit        Joe Conley MD

 

                    2012           Office visit        Joe Conley MD

 

                    3/31/2011           Office visit        Joe Conley MD

 

                    3/1/2011            Office visit        Joe Conley MD

 

                    2010           Laboratory          Ramy Raines MD

 

                    2010           Office visit        Joe Conley MD

 

                    2010           Office visit        Joe Conley MD

 

                    10/5/2009           Office visit        Joe Conley MD

## 2021-10-05 NOTE — XMS REPORT
MU2 Ambulatory Summary

                             Created on: 09/10/2021



Laury Og

External Reference #: [id number]

: 1940

Sex: Female



Demographics





                          Address                   [address]

[place]  [address]

 

                          Home Phone                [phone]

 

                          Preferred Language        English

 

                          Marital Status            

 

                          Jain Affiliation     Unknown

 

                          Race                      White

 

                          Ethnic Group              Not  or 





Author





                          Author                    Laury Lopez

 

                          Organization              [facility] 

[facility]

 

                          Address                   [address]

[place], [place]  [id number]



 

                          Phone                     [phone]







Care Team Providers





                    Care Team Member Name Role                Phone

 

                    Vijaya Lopez   PCP                 [phone]

 

                    Joe Conley     PreferredProvider   [phone]







Allergies and Adverse Reactions





                    Name                Reaction            Notes

 

                    erythromycin        vomiting             

 

                    STATINS: HMG-COA REDUCTASE INHIBITORS joint pain           

 

                    BANANAS             severe stomache pain  

 

                    Demerol             vomiting             







Plan of Treatment





             Planned Activity Comments     Planned Date Planned Time Plan/Goal

 

             gastritis and anemia, needing endsocpy                             

            

 

             EKG.                      2018   12:00 AM      

 

             CBC W/ AUTO DIFF (RFLX MAN DIFF IF IND).              9/10/2021    

12:00 AM      







Medications





                                        Active 

 

             Name         Start Date   Estimated Completion Date SIG          Co

mments

 

             aspirin 81 mg oral tablet                           take 1 tablet b

y oral route daily  

 

             biotin 5 mg oral capsule                           take 1 capsule b

y oral route daily  

 

                vitamin B12-folic acid 500-400 mcg oral tablet                  

               take 1 tablet by oral route 

daily                                    

 

                ibuprofen 800 mg oral tablet                                 noemi

e 1 tablet (800 mg) by oral route 3 times 

per day with food                        

 

                potassium chloride 20 mEq oral tablet extended release 2019

                       take 1 tablet

(20 meq) by oral route 2 times per day with food  

 

                Lomotil 2.5-0.025 mg oral tablet                                

 take 2 tablets (5 mg) by oral route once 

daily as needed                          

 

                Levaquin 500 mg oral tablet                                 take

 1 tablet (500 mg) by oral route once daily 

for 7 days                               

 

                hydrochlorothiazide 25 mg oral tablet 2019                

      TAKE 1 TABLET BY MOUTH EVERY 

DAY                                      

 

                HYDROCHLOROTHIAZIDE 25MG TABLETS 12/10/2020      2021      

 TAKE 1 TABLET BY MOUTH 

EVERY DAY                                

 

                clopidogrel 75 mg oral tablet                                 ta

ke 1 tablet (75 mg) by oral route once daily

for 30 days                              

 

                Iron (ferrous sulfate) 325 mg (65 mg iron) oral tablet 2021       take 

1 tablet (325 mg) by oral route once daily for 90 days  

 

                isosorbide mononitrate 30 mg oral tablet extended release 24 hr 

                                take 1 

tablet (30 mg) by oral route once daily in the morning for 90 days  

 

                melatonin 3 mg oral tablet 2021       take 

1 tablet by oral route daily

for 90 days                              

 

                nitroglycerin 0.4 mg sublingual tablet, sublingual 2021       give 1 

tablet sublingually every 5min. as needed for chest pain times 3 doses, if not 
relieved go to ER                        

 

                pantoprazole 40 mg oral tablet,delayed release (DR/EC) 2021

                       TAKE 1 TABLET

BY MOUTH EVERY DAY                       

 

                Protonix 40 mg oral tablet,delayed release (DR/EC) 2021       take 1 

tablet (40 mg) by oral route once daily for 90 days  

 

             allopurinol 300 mg oral tablet 2021                 TAKE 1 TAB

LET BY MOUTH ONCE DAILY  

 

                FERROUS SULFATE 325MG (5GR) TABS 2021      

 TAKE 1 TABLET BY MOUTH 

EVERY DAY                                

 

                carvedilol 25 mg oral tablet 9/3/2021        2022        noemi

e 1 tablet (25 mg) by oral 

route every 12 hours with food for 30 days  

 

                amoxicillin 875 mg oral tablet 9/3/2021        9/10/2021       t

irvin 1 tablet (875 mg) by oral

route every 12 hours for 7 days          







                                         

 

             Name         Start Date   Expiration Date SIG          Comments

 

             Biotin Oral                                          

 

                Zithromax Z-Jones 250 mg oral tablet 5/3/2010        2010     

   take 2 tablets (500 mg) by 

oral route once daily for 1 day then 1 tablet (250 mg) by oral route once daily 
for 4 days                               

 

                Coreg 12.5 mg oral tablet 2010        1BID -

 TAKE ONE TABLET BY MOUTH 

TWICE DAILY                              

 

                ALLOPURINOL 300MG    each 2011        2/3/2011       

 1QD - TAKE ONE TABLET BY 

MOUTH EVERY DAY                          

 

                Edarbyclor 40-12.5 mg oral tablet 4/15/2013       2013    

  take 1 tablet by oral 

route once daily                         

 

                carvedilol 12.5 mg oral tablet 2013       T

IRVIN ONE TABLET BY MOUTH TWICE

DAILY                                    

 

                gemfibrozil 600 mg oral tablet 2013       T

IRVIN ONE TABLET BY MOUTH TWICE

DAILY (BEFORE  MORNING  AND  EVENING  MEALS)  

 

                losartan-hydrochlorothiazide 100-25 mg oral tablet 2013    

   10/16/2013      TAKE ONE

TABLET BY MOUTH IN THE MORNING           

 

                Levaquin 500 mg oral tablet 2013      take

 1 tablet (500 mg) by oral 

route once daily for 7 days              

 

                Sporanox 100 mg oral capsule                                 noemi

e 1 capsule (100 mg) by oral route once 

daily with food                          

 

                Sunscreen SPF 30 2015                       apply q2h while

 outdoors, q1h if swimming or 

sweating                                 

 

             Vaseline     2015                 apply to treated area  

 

                urea 40 % topical cream 2015                       apply to

 the affected area(s) by topical 

route once a day                         

 

                econazole 1 % topical cream 2015                       appl

y to the affected and surrounding 

areas of skin by topical route once daily  

 

                Zetia 10 mg oral tablet                                 take 1 t

ablet (10 mg) by oral route once daily for 

30 days                                  

 

                amoxicillin 875 mg oral tablet                                 t

irvin 1 tablet (875 mg) by oral route every 12

hours for 10 days, finish 16        

 

                Nexium 40 mg oral capsule,delayed release(DR/EC) 2016      

 10/24/2016      take 1 

capsule (40 mg) by oral route once daily for 4 weeks  

 

                Keflex 500 mg oral capsule 4/3/2017        2017       take 

1 capsule (500 mg) by oral 

route every 12 hours for 14 days         

 

                Cipro 500 mg oral tablet                                 take 1 

tablet (500 mg) by oral route every 12 hours

for 7 days                               

 

                Hyzaar 100-25 mg oral tablet 2018       noemi

e 1 tablet by oral route once

daily for 30 days                        

 

                Naprosyn 500 mg oral tablet 2018       take

 1 tablet (500 mg) by oral 

route 2 times per day with food for 14 days  

 

                Cipro 250 mg oral tablet                                 take 1 

tablet (250 mg) by oral route every 12 hours

for 5 days                               

 

                Zithromax 500 mg oral tablet 3/21/2019       3/28/2019       noemi

e 1 tablet (500 mg) by oral 

route once daily for 7 days              

 

                Carafate 1 gram oral tablet 2019       take

 1 tablet by oral route 4 

times a day for 14 days                  

 

                Suprep Bowel Prep Kit 17.5-3.13-1.6 gram oral recon soln 20

19                       take as 

directed                                 

 

                Augmentin 875-125 mg oral tablet 10/11/2019      10/18/2019     

 take 1 tablet by oral 

route every 12 hours for 7 days          

 

                gemfibrozil 600 mg oral tablet 2019        T

IRVIN 1 TABLET BY MOUTH TWICE 

DAILY BEFORE MORNING AND EVENING MEALS for 30 days  







                                        Discontinued 

 

             Name         Start Date   Discontinued Date SIG          Comments

 

                Vaseretic 10-25 mg oral tablet                 3/1/2011        t

irvin 1 tablet by oral route once daily

                                         

 

                Omnaris 50 mcg nasal spray,non-aerosol 2010 

       inhale 2 sprays (100 

mcg) in each nostril by intranasal route once daily  

 

                Mobic 15 mg oral tablet 2010       3/1/2011        take 1 t

ablet (15 mg) by oral route 

once daily                               

 

                Cipro 500 mg oral tablet                 2013        take 1 

tablet (500 mg) by oral route every 

12 hours for 7 days                      

 

                amoxicillin 500 mg oral capsule                 2013        

take 1 capsule (500 mg) by oral route

3 times per day x 7days                  

 

                Levaquin 500 mg oral tablet                 2013        take

 1 tablet (500 mg) by oral route once

daily for 7 days                         

 

                hydrocortisone 2.5 % topical cream 2014    

   apply to the affected 

area(s) by topical route once daily      

 

                losartan-hydrochlorothiazide 100-25 mg oral tablet 10/13/2014   

   2015        TAKE ONE 

TABLET BY MOUTH IN THE MORNING           

 

                Hyzaar 100-25 mg oral tablet 2017        noemi

e 1 tablet by oral route once

daily for 30 days                        

 

                Diflucan 150 mg oral tablet                 2018       take

 1 tablet (150 mg) by oral route 

once                                     

 

                Macrobid 100 mg oral capsule 10/12/2017      2018       noemi

e 1 capsule (100 mg) by 

oral route every 12 hours with food for 7 days  

 

                Cipro 500 mg oral tablet 10/17/2017      2018       take 1 

tablet (500 mg) by oral 

route every 12 hours for 7 days          

 

                Iron (ferrous sulfate) 325 mg (65 mg iron) oral tablet          

       2021       take 1 tablet

(325 mg) by oral route 2 times per day   

 

                Dexilant 60 mg oral capsule,biphase delayed releas 2019       take 1 

capsule (60 mg) by oral route once daily  







Problem List





                    Description         Status              Onset

 

                    Hypertension        Active               

 

                    Gout                Active               

 

                    Heart disease       Active               

 

                    Hyperlipidemia      Active               

 

                    Arthritis unspecified Active               

 

                    Actinic Keratosis   Active              2015

 

                    Onychomycosis       Active              2015

 

                    Seborrheic keratoses Active              2015

 

                    BCC (basal cell carcinoma of skin) Active              

 

                    Keratosis           Active              2017

 

                    Dyspepsia           Active              2019

 

                    Change in bowel habit Active              2019







Vital Signs





     Date Time BP-Sys(mm[Hg] BP-Ester(mm[Hg]) HR(bpm) RR(rpm) Temp WT   HT   HC   

BMI  BSA  BMI

 Percentile                             O2 Sat(%)

 

        9/3/2021 10:48:00  mm[Hg] 70 mm[Hg] 74 {beats}/min 20 rpm  98.1 F 

 161.5 lbs

           66 in                 26.0665 kg/m2 1.847 m2              97 %

 

        2021 10:04:00  mm[Hg] 72 mm[Hg] 70 {beats}/min 18 rpm  98.6 F

  150.25 

lbs        66 in                 24.25 kg/m2 1.78 m2               98 %

 

        10/11/2019 10:55:00  mm[Hg] 60 mm[Hg] 65 {beats}/min 18 rpm  97.9 

F  141 lbs

           66 in                 22.7578 kg/m2 1.7258 m2             98 %

 

        2019 1:58:00  mm[Hg] 72 mm[Hg] 65 {beats}/min 20 rpm  97 F    

145.5 lbs 66

 in                       23.48 kg/m2  1.75 m2                    

 

        2019 8:53:00  mm[Hg] 63 mm[Hg] 70 {beats}/min 70 rpm  97.5 F 

 145.5 lbs

           66 in                 23.4841 kg/m2 1.7531 m2              

 

        2019 8:47:00  mm[Hg] 63 mm[Hg] 75 {beats}/min 20 rpm  98.8 F 

 150 lbs 

66 in                     24.21 kg/m2  1.78 m2                    

 

        2019 9:02:00  mm[Hg] 78 mm[Hg] 65 {beats}/min 18 rpm  98.4 F 

 156.375 

lbs        67 in                 24.4915 kg/m2 1.8311 m2             98 %

 

        2019 9:50:00  mm[Hg] 68 mm[Hg] 68 {beats}/min 18 rpm  97.9 F 

 156 lbs 

67 in                     24.43 kg/m2  1.83 m2                   100 %

 

        2019 9:10:00  mm[Hg] 70 mm[Hg] 65 {beats}/min 18 rpm  97.2 F 

 158.5 lbs

           67 in                 24.8244 kg/m2 1.8435 m2             97 %

 

        3/21/2019 8:53:00  mm[Hg] 72 mm[Hg] 80 {beats}/min 20 rpm  97.5 F 

 154.375 

lbs        67 in                 24.18 kg/m2 1.82 m2               96 %

 

        2019 8:54:00  mm[Hg] 72 mm[Hg] 64 {beats}/min 14 rpm  97.9 F  

156 lbs 67

 in                       24.4328 kg/m2 1.8289 m2                 98 %

 

        2018 11:03:00  mm[Hg] 70 mm[Hg] 68 {beats}/min 16 rpm  98.1 

F  163 lbs

           67 in                 25.53 kg/m2 1.87 m2               98 %

 

        2018 8:39:00  mm[Hg] 74 mm[Hg] 65 {beats}/min 18 rpm  97.7 F  

171.25 lbs

           67 in                 26.8213 kg/m2 1.9163 m2             97 %

 

        2018 9:06:00  mm[Hg] 92 mm[Hg] 62 {beats}/min 16 rpm  96.1 F 

 169 lbs 

67 in                     26.47 kg/m2  1.90 m2                   97 %

 

        10/12/2017 9:18:00  mm[Hg] 70 mm[Hg] 61 {beats}/min 14 rpm  97.3 F

  168.25 

lbs        67 in                 26.3514 kg/m2 1.8994 m2             99 %

 

        2017 8:13:00  mm[Hg] 76 mm[Hg] 69 {beats}/min 18 rpm  97.3 F  

172 lbs 67

 in                       26.94 kg/m2  1.92 m2                   98 %

 

        2017 11:06:00  mm[Hg] 98 mm[Hg] 62 {beats}/min 16 rpm  96.9 F

  178 lbs 

67 in                     27.8785 kg/m2 1.9537 m2                 9 %

 

        2017 10:35:00  mm[Hg] 88 mm[Hg] 67 {beats}/min 20 rpm  97.8 F 

 179 lbs 

67 in                     28.04 kg/m2  1.96 m2                    

 

        3/24/2017 9:50:00  mm[Hg] 88 mm[Hg] 67 {beats}/min 20 rpm  97.8 F 

 179 lbs 

67 in                     28.0351 kg/m2 1.9591 m2                  

 

        3/1/2017 9:25:00  mm[Hg] 78 mm[Hg] 62 {beats}/min 17 rpm  97.6 F  

180 lbs 67

 in                       28.19 kg/m2  1.96 m2                   97 %

 

        2016 10:56:00  mm[Hg] 88 mm[Hg] 60 {beats}/min 18 rpm  97.6 F

  179 lbs 

67 in                     28.0351 kg/m2 1.9591 m2                 96 %

 

        2016 8:24:00  mm[Hg] 70 mm[Hg] 72 {beats}/min 18 rpm  97.5 F 

 174 lbs 

67 in                     27.25 kg/m2  1.93 m2                   99 %

 

        2015 1:40:00  mm[Hg] 72 mm[Hg] 57 {beats}/min 18 rpm  98.5 F  

191.25 lbs

           67 in                 29.9537 kg/m2 2.0251 m2             96 %

 

        2014 2:11:00  mm[Hg] 64 mm[Hg] 74 {beats}/min 20 rpm  97.6 F 

 205 lbs 

67 in                     32.11 kg/m2  2.10 m2                    

 

        2014 9:28:00  mm[Hg] 80 mm[Hg] 72 {beats}/min 18 rpm  96.8 F 

 203 lbs 

67 in                     31.794 kg/m2 2.0863 m2                  

 

        10/7/2013 2:22:00  mm[Hg] 75 mm[Hg] 70 {beats}/min 18 rpm  98 F   

 206 lbs 67 

in                        32.26 kg/m2  2.10 m2                   95 %

 

        2013 9:42:00  mm[Hg] 72 mm[Hg] 68 {beats}/min 18 rpm  97.4 F  

205 lbs 67

 in                       32.1072 kg/m2 2.0966 m2                  

 

        3/15/2013 10:41:00  mm[Hg] 70 mm[Hg] 60 {beats}/min 18 rpm  97.2 F

  201 lbs 

66 in                     32.44 kg/m2  2.06 m2                    

 

        2012 1:46:00  mm[Hg] 78 mm[Hg] 60 {beats}/min 18 rpm  97.2 F

  203 lbs 

                                                                  

 

        2012 8:48:00  mm[Hg] 80 mm[Hg] 62 {beats}/min 18 rpm  97 F   

 207 lbs 66 

in                        33.41 kg/m2  2.091 m2                   

 

        3/31/2011 9:58:00  mm[Hg] 82 mm[Hg] 68 {beats}/min 20 rpm  97.2 F 

 203 lbs  

                                                                  

 

      3/1/2011 9:05:00  mm[Hg] 74 mm[Hg] 64 {beats}/min 20 rpm 97 F  203 l

bs             

                                                             

 

      2010 9:03:00  mm[Hg] 72 mm[Hg] 58 {beats}/min 20 rpm       203 

lbs                   

                                                     

 

        2010 8:40:00  mm[Hg] 88 mm[Hg] 68 {beats}/min 18 rpm  96.9 F 

 209 lbs  

                                                                  







Social History





                    Name                Description         Comments

 

                    denies alcohol use                       

 

                    Lives with spouse                        

 

                    Tobacco             Never smoker         







History of Procedures





                    Date Ordered        Description         Order Status

 

                    2016 12:00 AM  COMPLETE CBC W/AUTO DIFF WBC Reviewed

 

                    2016 12:00 AM  COMPREHEN METABOLIC PANEL Reviewed

 

                    2016 12:00 AM  LIPID PANEL         Reviewed

 

                    2016 12:00 AM  GLYCOSYLATED HEMOGLOBIN TEST Reviewed

 

                    2012 12:00 AM  COMPLETE CBC W/AUTO DIFF WBC Reviewed

 

                    2012 12:00 AM  COMPREHEN METABOLIC PANEL Reviewed

 

                    2012 12:00 AM  LIPID PANEL         Reviewed

 

                    2012 12:00 AM  GLYCOSYLATED HEMOGLOBIN TEST Reviewed

 

                    2012 12:00 AM  ASSAY THYROID STIM HORMONE Reviewed

 

                    2012 12:00 AM  ASSAY OF BLOOD/URIC ACID Reviewed

 

                    2012 12:00 AM  URINALYSIS AUTO W/SCOPE Reviewed

 

                    3/1/2017 12:00 AM   COMPLETE CBC W/AUTO DIFF WBC Reviewed

 

                    3/1/2017 12:00 AM   COMPREHEN METABOLIC PANEL Reviewed

 

                    3/1/2017 12:00 AM   LIPID PANEL         Reviewed

 

                    3/1/2017 12:00 AM   ASSAY THYROID STIM HORMONE Reviewed

 

                    3/1/2017 12:00 AM   VITAMIN B-12        Reviewed

 

                    3/24/2017 12:00 AM  CMPLX RPR F/C/C/M/N/AX/G/H/F Reviewed

 

                    2017 12:00 AM   COMPLETE CBC W/AUTO DIFF WBC Returned

 

                    2017 12:00 AM   COMPREHEN METABOLIC PANEL Returned

 

                    2017 12:00 AM   ASSAY THYROID STIM HORMONE Returned

 

                    2017 12:00 AM   VITAMIN B-12        Returned

 

                    2017 12:00 AM   ASSAY OF BLOOD/URIC ACID Returned

 

                    2017 12:00 AM   X-RAY EXAM OF HAND  Returned

 

                    2017 12:00 AM   ASSAY OF PREALBUMIN Returned

 

                    10/12/2017 12:00 AM URINE CULTURE/COLONY COUNT Reviewed

 

                    10/12/2017 9:44 AM  URINALYSIS AUTO W/O SCOPE Reviewed

 

                    2018 12:00 AM  COMPLETE CBC W/AUTO DIFF WBC Returned

 

                    2018 12:00 AM  COMPREHEN METABOLIC PANEL Returned

 

                    2018 12:00 AM  LIPID PANEL         Returned

 

                    2018 12:00 AM  GLYCOSYLATED HEMOGLOBIN TEST Returned

 

                    2018 12:00 AM  ASSAY OF BLOOD/URIC ACID Returned

 

                    2018 12:00 AM  ASSAY OF URINE/URIC ACID Returned

 

                    3/4/2013 12:00 AM   CHEST X-RAY 2VW FRONTAL&LATL Reviewed

 

                    2010 12:00 AM  COMPLETE CBC W/AUTO DIFF WBC Reviewed

 

                    2010 12:00 AM  COMPREHEN METABOLIC PANEL Reviewed

 

                    2010 12:00 AM  LIPID PANEL         Reviewed

 

                    2010 12:00 AM  ASSAY OF BLOOD/URIC ACID Reviewed

 

                    2010 12:00 AM  GLYCOSYLATED HEMOGLOBIN TEST Reviewed

 

                    2018 12:00 AM CHEST X-RAY 2VW FRONTAL&LATL Returned

 

                    2018 12:00 AM COMPLETE CBC W/AUTO DIFF WBC Returned

 

                    2018 12:00 AM COMPREHEN METABOLIC PANEL Returned

 

                    2018 12:00 AM ASSAY OF TROPONIN QUANT Returned

 

                    2013 12:00 AM   COMPLETE CBC W/AUTO DIFF WBC Reviewed

 

                    2013 12:00 AM   COMPREHEN METABOLIC PANEL Reviewed

 

                    2013 12:00 AM   GLYCOSYLATED HEMOGLOBIN TEST Reviewed

 

                    2019 12:00 AM  METABOLIC PANEL TOTAL CA Returned

 

                    2019 12:00 AM  CT ABD & PELV W/CONTRAST Returned

 

                    2019 12:00 AM   US EXAM ABDO BACK WALL COMP Returned

 

                    2019 3:40 PM   URINALYSIS AUTO W/O SCOPE Reviewed

 

                    2019 12:00 AM  COMPLETE CBC W/AUTO DIFF WBC Reviewed

 

                    2019 12:00 AM  COMPREHEN METABOLIC PANEL Reviewed

 

                    2019 12:00 AM  ELECTROCARDIOGRAM TRACING Reviewed

 

                    2019 12:00 AM  ASSAY OF TROPONIN QUANT Reviewed

 

                    2019 12:00 AM  FECES CULTURE AEROBIC BACT Reviewed

 

                    2019 12:00 AM  C DIFF AMPLIFIED PROBE Reviewed

 

                    2019 12:00 AM  Tick Panel          Reviewed

 

                    2019 12:00 AM  METABOLIC PANEL TOTAL CA Returned

 

                    2019 12:00 AM  COMPLETE CBC W/AUTO DIFF WBC Returned

 

                    2019 12:00 AM  VITAMIN B-12        Returned

 

                    2019 12:00 AM  ASSAY OF IRON       Returned

 

                    6/10/2019 12:00 AM  ECHO EXAM OF ABDOMEN Reviewed

 

                    6/10/2019 12:00 AM  HEPATOBILIARY SYSTEM IMAGING Reviewed

 

                    2019 12:00 AM  METABOLIC PANEL TOTAL CA Returned

 

                    2019 12:00 AM  ASSAY OF IRON       Returned

 

                    10/11/2019 12:00 AM CT THORAX W/DYE     Returned

 

                    10/14/2019 12:00 AM METABOLIC PANEL TOTAL CA Returned

 

                    2020 12:00 AM   COMPLETE CBC W/AUTO DIFF WBC Returned

 

                    2020 12:00 AM   COMPREHEN METABOLIC PANEL Returned

 

                    2020 12:00 AM   LIPID PANEL         Returned

 

                    2020 12:00 AM   CT THORAX W/DYE     Returned

 

                    2021 12:00 AM  COMPLETE CBC W/AUTO DIFF WBC Returned

 

                    2021 12:00 AM  COMPREHEN METABOLIC PANEL Returned

 

                    2021 12:00 AM  ASSAY THYROID STIM HORMONE Returned

 

                    2021 12:00 AM  ASSAY OF TROPONIN QUANT Returned

 

                    2021 12:00 AM  ASSAY OF BLOOD/URIC ACID Returned

 

                    2021 12:00 AM  ASSAY OF IRON       Returned

 

                    2021 12:00 AM  IRON BINDING TEST   Returned

 

                    2021 12:00 AM  ASSAY OF TRANSFERRIN Returned

 

                    2021 12:00 AM  VITAMIN B-12        Returned

 

                    2021 12:00 AM  COMPLETE CBC W/AUTO DIFF WBC Returned

 

                    2021 12:00 AM  METABOLIC PANEL TOTAL CA Returned

 

                    9/3/2021 12:00 AM   COMPLETE CBC W/AUTO DIFF WBC Returned

 

                    9/3/2021 12:00 AM   COMPREHEN METABOLIC PANEL Returned

 

                    9/3/2021 12:00 AM   ASSAY OF NATRIURETIC PEPTIDE Returned

 

                    9/3/2021 12:00 AM   ASSAY THYROID STIM HORMONE Returned

 

                    9/3/2021 12:00 AM   ASSAY OF BLOOD/URIC ACID Returned

 

                    9/3/2021 12:00 AM   ASSAY OF MAGNESIUM  Returned

 

                    9/3/2021 12:00 AM   ASSAY OF IRON       Returned

 

                    9/3/2021 12:00 AM   IRON BINDING TEST   Returned

 

                    9/3/2021 12:00 AM   ASSAY OF TRANSFERRIN Returned

 

                    9/3/2021 12:00 AM   VITAMIN B-12        Returned

 

                    2021 12:00 AM   COMPLETE CBC W/AUTO DIFF WBC Returned

 

                    2/15/2011 12:00 AM  ROUTINE VENIPUNCTURE Reviewed

 

                    2/15/2011 12:00 AM  COMPREHEN METABOLIC PANEL Reviewed

 

                    2/15/2011 12:00 AM  LIPID PANEL         Reviewed

 

                    2/15/2011 12:00 AM  ASSAY OF BLOOD/URIC ACID Reviewed

 

                    2010 12:00 AM  TTE W/O DOPPLER COMPLETE Reviewed

 

                    2015 12:00 AM  DESTRUCT PREMALG LESION Reviewed

 

                    2015 12:00 AM   COMPLETE CBC W/AUTO DIFF WBC Reviewed

 

                    2015 12:00 AM   COMPREHEN METABOLIC PANEL Reviewed

 

                    2015 12:00 AM   LIPID PANEL         Reviewed

 

                    2015 12:00 AM   GLYCOSYLATED HEMOGLOBIN TEST Reviewed

 

                    2015 12:00 AM   ASSAY OF BLOOD/URIC ACID Reviewed







Results Summary





                          Date and Description      Results

 

                          2010 9:45 AM         GLYCOHEMOGLOBIN A1C 6.30 %UR

IC ACID 5.3 mg/dLTRIGLYCERIDES 

90.0 mg/dLCHOLESTEROL 209.0 mg/dLHDL 43.0 mg/dLTOT CHOL/HDL 4.9 LDL (CALC) 148.0
 mg/dLWBC 10.0 RBC 4.75 HGB 14.10 g/dLHCT 41.0 %MCV 86.0 fLMCH 29.70 pgMCHC 
34.40 g/dLRDW SD 45 RDW CV 14.50 %MPV 10.40 fLPLT 354 NRBC# 0.00 NRBC% 0.0 %NEUT
 63.60 %%LYMP 21.60 %%MONO 5.90 %%EOS 8.30 %%BASO 0.60 %#NEUT 6.35 #LYMP 2.16 
#MONO 0.59 #EOS 0.83 #BASO 0.06 MANUAL DIFF NOT IND GLUCOSE 112.0 mg/dLSODIUM 
142.0 mmol/LPOTASSIUM 3.90 mmol/LCHLORIDE 103.0 mmol/LCO2 28.0 mmol/LBUN 20.0 
mg/dLCREATININE 0.90 mg/dLSGOT/AST 23.0 IU/LSGPT/ALT 13.0 IU/LALK PHOS 75.0 
IU/LTOTAL PROTEIN 6.90 g/dLALBUMIN 4.30 g/dLTOTAL BILI 0.50 mg/dLCALCIUM 9.0 
mg/dLAGE 70 GFR NonAA 62 GFR AA 75 eGFR >60 mL/min/1.73 m2eGFR AA* >60 

 

                          2/15/2011 9:15 AM         TRIGLYCERIDES 97.0 mg/dLCHOL

ESTEROL 212.0 mg/dLHDL 48.0 

mg/dLTOT CHOL/HDL 4.4 LDL (CALC) 145.0 mg/dLURIC ACID 5.3 mg/dLGLUCOSE 110.0 
mg/dLSODIUM 141.0 mmol/LPOTASSIUM 4.10 mmol/LCHLORIDE 102.0 mmol/LCO2 29.0 
mmol/LBUN 18.0 mg/dLCREATININE 0.90 mg/dLSGOT/AST 31.0 IU/LSGPT/ALT 17.0 IU/LALK
 PHOS 74.0 IU/LTOTAL PROTEIN 7.60 g/dLALBUMIN 4.30 g/dLTOTAL BILI 0.30 
mg/dLCALCIUM 10.20 mg/dLAGE 71 GFR NonAA 62 GFR AA 75 eGFR >60 mL/min/1.73 
m2eGFR AA* >60 

 

                          2012 9:55 AM         WBC 8.7 RBC 4.63 HGB 13.40 g

/dLHCT 40.70 %MCV 88.0 fLMCH 28.90

 pgMCHC 32.90 g/dLRDW SD 47 RDW CV 14.70 %MPV 10.20 fLPLT 388 NRBC# 0.00 NRBC% 
0.0 %NEUT 59.90 %%LYMP 22.40 %%MONO 5.60 %%EOS 11.50 %%BASO 0.60 %#NEUT 5.20 
#LYMP 1.94 #MONO 0.49 #EOS 1.00 #BASO 0.05 MANUAL DIFF NOT IND GLUCOSE 114.0 
mg/dLSODIUM 142.0 mmol/LPOTASSIUM 3.80 mmol/LCHLORIDE 104.0 mmol/LCO2 28.0 
mmol/LBUN 23.0 mg/dLCREATININE 0.90 mg/dLSGOT/AST 20.0 IU/LSGPT/ALT 15.0 IU/LALK
 PHOS 69.0 IU/LTOTAL PROTEIN 7.20 g/dLALBUMIN 4.30 g/dLTOTAL BILI 0.30 
mg/dLCALCIUM 9.90 mg/dLAGE 72 GFR NonAA 62 GFR AA 75 eGFR >60 mL/min/1.73 m2eGFR
 AA* >60 TRIGLYCERIDES 94.0 mg/dLCHOLESTEROL 217.0 mg/dLHDL 43.0 mg/dLTOT 
CHOL/HDL 5.0 LDL (CALC) 155.0 mg/dLURIC ACID 5.6 mg/dLTSH 1.920 
uIU/mLGLYCOHEMOGLOBIN A1C 6.30 %

 

                          2012 2:42 PM         COLOR YELLOW APPEARANCE HAZY

 SPEC GRAV 1.025 pH 5.5 PROTEIN 

NEGATIVE GLUCOSE NEGATIVE KETONE NEGATIVE BILIRUBIN NEGATIVE BLOOD TRACE-INTACT 
NITRITE NEGATIVE LEUK SCREEN MODERATE WBC/HPF  RBC/HPF 0-5 CASTS/LPF 
NEGATIVE CRYSTALS NEGATIVE MUCOUS THRDS NEGATIVE BACTERIA 1+ EPITH CELLS 1+ 
RENAL TRICHOMONAS NEGATIVE YEAST NEGATIVE CULT SET UP? YES 

 

                          2013 10:48 AM         WBC 10.3 RBC 4.50 HGB 13.30 

g/dLHCT 39.10 %MCV 87.0 fLMCH 

29.60 pgMCHC 34.0 g/dLRDW SD 46 RDW CV 14.60 %MPV 9.80 fLPLT 354 NRBC# 0.00 
NRBC% 0.0 %NEUT 63.60 %%LYMP 23.40 %%MONO 4.90 %%EOS 7.70 %%BASO 0.40 %#NEUT 
6.56 #LYMP 2.42 #MONO 0.51 #EOS 0.79 #BASO 0.04 MANUAL DIFF NOT IND HGB A1C 6.20
 %Est Avg Glucose 131.2 mg/dLGLUCOSE 90.0 mg/dLSODIUM 142.0 mmol/LPOTASSIUM 3.80
 mmol/LCHLORIDE 102.0 mmol/LCO2 29.0 mmol/LBUN 19.0 mg/dLCREATININE 0.90 
mg/dLSGOT/AST 24.0 IU/LSGPT/ALT 17.0 IU/LALK PHOS 77.0 IU/LTOTAL PROTEIN 7.30 
g/dLALBUMIN 4.20 g/dLTOTAL BILI 0.40 mg/dLCALCIUM 10.60 mg/dLAGE 73 GFR NonAA 62
 GFR AA 75 eGFR 60 eGFR AA* 60 

 

                          4/10/2015 9:58 AM         WBC 8.5 RBC 4.40 HGB 12.90 g

/dLHCT 39.40 %MCV 90.0 fLMCH 29.30

 pgMCHC 32.70 g/dLRDW SD 47 RDW CV 14.40 %MPV 9.90 fLPLT 393 NRBC# 0.00 NRBC% 
0.0 %NEUT 56.0 %%LYMP 25.60 %%MONO 5.40 %%EOS 12.50 %%BASO 0.50 %#NEUT 4.75 
#LYMP 2.17 #MONO 0.46 #EOS 1.06 #BASO 0.04 MANUAL DIFF NOT IND GLUCOSE 123.0 
mg/dLSODIUM 142.0 mmol/LPOTASSIUM 4.10 mmol/LCHLORIDE 105.0 mmol/LCO2 26.0 
mmol/LBUN 22.0 mg/dLCREATININE 1.10 mg/dLSGOT/AST 20.0 IU/LSGPT/ALT 15.0 IU/LALK
 PHOS 94.0 IU/LTOTAL PROTEIN 7.70 g/dLALBUMIN 4.20 g/dLTOTAL BILI 0.30 
mg/dLCALCIUM 10.50 mg/dLAGE 75 GFR NonAA 49 GFR AA 59 eGFR 49 eGFR AA* 59 
TRIGLYCERIDES 111.0 mg/dLCHOLESTEROL 201.0 mg/dLHDL 35.0 mg/dLTOT CHOL/HDL 5.7 
LDL (CALC) 144.0 mg/dLURIC ACID 4.7 mg/dLHGB A1C 6.30 %Est Avg Glucose 134.1 
mg/dL

 

                          2016 8:47 AM         WBC 7.8 RBC 4.67 HGB 13.50 g

/dLHCT 40.90 %MCV 88.0 fLMCH 28.90

 pgMCHC 33.0 g/dLRDW SD 46 RDW CV 14.50 %MPV 10.30 fLPLT 360 NRBC# 0.00 NRBC% 
0.0 %NEUT 59.50 %%LYMP 25.90 %%MONO 6.10 %%EOS 7.90 %%BASO 0.60 %#NEUT 4.65 
#LYMP 2.03 #MONO 0.48 #EOS 0.62 #BASO 0.05 MANUAL DIFF NOT IND GLUCOSE 115.0 
mg/dLSODIUM 144.0 mmol/LPOTASSIUM 3.90 mmol/LCHLORIDE 99.0 mmol/LCO2 30.0 
mmol/LBUN 30.0 mg/dLCREATININE 1.30 mg/dLSGOT/AST 19.0 IU/LSGPT/ALT 14.0 IU/LALK
 PHOS 96.0 IU/LTOTAL PROTEIN 6.90 g/dLALBUMIN 4.40 g/dLTOTAL BILI 0.40 
mg/dLCALCIUM 10.80 mg/dLAGE 76 GFR NonAA 40 GFR AA 48 eGFR 40 eGFR AA* 48 
TRIGLYCERIDES 127.0 mg/dLCHOLESTEROL 204.0 mg/dLHDL 42.0 mg/dLTOT CHOL/HDL 4.9 
LDL (CALC) 137.0 mg/dLHemoglobin A1c 6.30 %Estim. Avg Glu (eAG) 134 

 

                          3/1/2017 9:48 AM          GLUCOSE 101.0 mg/dLSODIUM 14

3.0 mmol/LPOTASSIUM 4.10 

mmol/LCHLORIDE 108.0 mmol/LCO2 25.0 mmol/LBUN 20.0 mg/dLCREATININE 1.20 
mg/dLSGOT/AST 19.0 IU/LSGPT/ALT 10.0 IU/LALK PHOS 96.0 IU/LTOTAL PROTEIN 7.60 
g/dLALBUMIN 4.0 g/dLTOTAL BILI 0.30 mg/dLCALCIUM 10.0 mg/dLAGE 77 GFR NonAA 44 
GFR AA 53 eGFR 44 eGFR AA* 53 TRIGLYCERIDES 61.0 mg/dLCHOLESTEROL 190.0 mg/dLHDL
 40.0 mg/dLTOT CHOL/HDL 4.8 LDL (CALC) 138.0 mg/dLWBC 10.8 RBC 4.50 HGB 13.10 
g/dLHCT 38.90 %MCV 86.0 fLMCH 29.10 pgMCHC 33.70 g/dLRDW SD 45 RDW CV 14.40 %MPV
 9.70 fLPLT 341 NRBC# 0.00 NRBC% 0.0 %NEUT 66.20 %%LYMP 15.80 %%MONO 4.80 %%EOS 
12.30 %%BASO 0.50 %#NEUT 7.19 #LYMP 1.71 #MONO 0.52 #EOS 1.33 #BASO 0.05 MANUAL 
DIFF NOT IND TSH 1.950 uIU/mLVITAMIN B12 331.0 pg/mL

 

                          10/12/2017 9:44 AM        Color Ur lt. yellow Glucose 

Ur-sCnc neg Bilirub Ur Ql Strip 

neg Ketones Ur Ql Strip neg Sp Gr Ur Qn 1.015 Hgb Ur Ql Strip neg pH Ur-LsCnc 
5.5 Prot Ur Ql Strip trace Urobilinogen Ur-mCnc 0.2eu/dl Nitrite Ur Ql Strip neg
 WBC Est Ur Ql Strip large 

 

                          2018 2:05 PM        Falls in last 6 months? No U

nsteady or worry about falling? 

Yes Fall Risk Assessment At Risk 

 

                          2019 10:44 AM        WBC 8.0 RBC 4.11 HGB 10.70 g

/dLHCT 34.60 %MCV 84.0 fLMCH 26.0

 pgMCHC 30.90 g/dLRDW SD 48 fLRDW CV 15.70 %MPV 9.90 fLPLT 517 NRBC# 0.00 NRBC% 
0.0 %NEUT 75.1 %LYMP 14.4 %MONO 5.9 %EOS 3.9 %BASO 0.4 #NEUT 6.02 #LYMP 1.15 
#MONO 0.47 #EOS 0.31 #BASO 0.03 MANUAL DIFF NOT IND GLUCOSE 101 SODIUM 142 
POTASSIUM 3.9 CHLORIDE 107.0 mmol/LCO2 27 BUN 18.0 mg/dLCREATININE 1.040 
mg/dLSGOT/AST 13 SGPT/ALT 6 ALK PHOS 111 TOTAL PROTEIN 7.5 ALBUMIN 3.5 TOTAL 
BILI 0.2 CALCIUM 10.20 mg/dLAGE 79 GFR NonAA 51 GFR AA 62 eGFR 51 eGFR AA* >60 
mL/min/1.73 [id number] AD 0.06 TOXIGENIC C DIFF NEGATIVE 027 NAP1 STRAIN 
PRESUMPTIVE NEGATIVE Lyme IgG/IgM Ab <0.91 Lyme Disease Ab, Quant,IgM WILL 
FOLLOW RMSF, IgG, EIA WILL FOLLOW [facility] M[employer] Spotted Fever,IgM WILL FOLLOW E. 
chaffeensis (HME) IgGTiter WILL FOLLOW E. chaffeensis (HME) IgMTiter WILL FOLLOW
 

 

                          2019 3:40 PM         Color Ur lt. yellow Glucose 

Ur-sCnc neg Bilirub Ur Ql Strip 

neg Ketones Ur Ql Strip neg Sp Gr Ur Qn 1.010 Hgb Ur Ql Strip neg pH Ur-LsCnc 
5.0 Prot Ur Ql Strip 100mg/dl Urobilinogen Ur-mCnc 0.2eu/dl Nitrite Ur Ql Strip 
neg WBC Est Ur Ql Strip neg 







History Of Immunizations





      Name  Date Admin Mfg Name Mfg Code Trade Name Lot#  Route Inj   Vis Given 

Vis Pub 

CVX

 

        Pneumococcal 10/1/2019 Not Entered NE      PREVNAR 13 [id number]  Intramuscu

lar Left Arm 

10/1/2019                 1                  133

 

        Influenza 10/1/2019 Not Entered NE      FLUZONE-HIGH DOSE [id number] Intram

uscular Left 

Arm                 10/1/2019           1            135

 

        Influenza 2020 Not Entered NE      FLUZONE-HIGH DOSE         Not Ent

ered Not Entered 

1                  135







History of Past Illness





                    Name                Date of Onset       Comments

 

                    Hypertension        2010  8:42AM  

 

                    Hyperlipidemia, unspecified 2010  8:42AM  

 

                    Arthritis unspecified                      

 

                    Heart disease                            

 

                    Hypertension                             

 

                    Hyperlipidemia                           

 

                    Gout                                     

 

                    Hypertension        2010  9:09AM  

 

                    Gout                2010  9:09AM  

 

                    Osteoarthritis      2010  9:09AM  

 

                    Hyperglycemia       2010  9:09AM  

 

                    Onychodystrophy                          

 

                    Contact Dermatitis                       

 

                    Actinic Keratosis   2015           

 

                    Onychomycosis       2015           

 

                    Seborrheic keratoses 2015           

 

                    Coronary Artery Disease Feb 15 2011  9:03AM  

 

                    Hypertension        Feb 15 2011  9:03AM  

 

                    Hyperlipidemia      Feb 15 2011  9:03AM  

 

                    Gout                Feb 15 2011  9:03AM  

 

                    Hypertension        Mar  1 2011  9:06AM  

 

                    Hyperlipidemia, unspecified Mar  1 2011  9:06AM  

 

                    Gout                Mar  1 2011  9:06AM  

 

                    Hypertension        Mar 31 2011 10:01AM  

 

                    Headache            Mar 31 2011 10:01AM  

 

                    BCC (basal cell carcinoma of skin) 2017           

 

                    Keratosis           2017           

 

                    Dyspepsia           2019           

 

                    Change in bowel habit 2019           

 

                    Hypertension        2012  8:54AM  

 

                    Hyperlipidemia, unspecified 2012  8:54AM  

 

                    Diabetes Mellitus, Type II 2012  8:54AM  

 

                    Gout                2012  8:54AM  

 

                    Night sweats        2012  8:54AM  

 

                    Dysuria             May 21 2012  2:12PM  

 

                    Chest Pain          Dec 13 2012  1:51PM  

 

                    Cough               Mar  4 2013 11:11AM  

 

                    Hypertension        Mar 15 2013 10:45AM  

 

                    Hypertension        Aug  9 2013  9:47AM  

 

                    Hyperglycemia       Aug  9 2013  9:47AM  

 

                    Onychomycosis of Fingernail Oct  7 2013  2:24PM  

 

                    Onychomycosis of Fingernail Oct  8 2013  9:23AM  

 

                    Onychomycosis of Fingernail 2014  9:35AM  

 

                    Atopic Dermatitis   2014  9:35AM  

 

                    Lung Nodule         2014  9:35AM  

 

                    Hypertension        2014  9:35AM  

 

                    Hypertension        2014  2:16PM  

 

                    Onychomycosis of Fingernail 2014  2:16PM  

 

                    Gout                2014  2:16PM  

 

                    Actinic keratosis   2015  2:14PM  

 

                    Onychomycosis       2015  2:14PM  

 

                    Seborrheic keratoses 2015  2:14PM  

 

                    Hypertension        2015  1:45PM  

 

                    Gout                2015  1:45PM  

 

                    Hyperglycemia       2015  1:45PM  

 

                    Hyperlipidemia, unspecified 2016  8:25AM  

 

                    Hyperglycemia       2016  8:25AM  

 

                    Esophageal Reflux   Sep 26 2016 11:00AM  

 

                    Hyperlipidemia, unspecified Mar  1 2017  9:27AM  

 

                    Fatigue             Mar  1 2017  9:27AM  

 

                    BCC (basal cell carcinoma of skin) Mar 24 2017 10:21AM  

 

                    BCC (basal cell carcinoma of skin) Mar 24 2017 10:21AM  

 

                    Keratosis           Mar 24 2017 10:21AM  

 

                    Hypertension        2017 11:14AM  

 

                    Hypertension        2017  8:16AM  

 

                    Gout                2017  8:16AM  

 

                    Fatigue             2017  8:16AM  

 

                    Weight loss         2017  8:16AM  

 

                    Acute cystitis without hematuria Oct 12 2017  9:21AM  

 

                    Hypertension        2018  9:14AM  

 

                    Hyperglycemia       2018  9:14AM  

 

                    Gout                2018  9:14AM  

 

                    Hypertension        May  4 2018  8:41AM  

 

                    TMJ syndrome        May  4 2018  8:41AM  

 

                    Chest pain          2018 11:10AM  

 

                    Dyspnea             2018 11:10AM  

 

                    Risk for falls      Dec 18 2018  2:05PM  

 

                    Gastritis           2019  9:00AM  

 

                    Upper Respiratory Infection Mar 21 2019  8:56AM  

 

                    IBS (irritable bowel syndrome) 2019  9:13AM  

 

                    Irritable bowel syndrome (IBS) 2019  3:27PM  

 

                    Blood tests prior to treatment or procedure 2019  3:2

7PM  

 

                    Renal cyst          May  8 2019 12:28PM  

 

                    Shortness Of Breath May 16 2019  9:52AM  

 

                    Chest pain          May 16 2019  9:52AM  

 

                    Diarrhea            May 16 2019  9:52AM  

 

                    GERD without esophagitis May 16 2019  9:52AM  

 

                                        Bitten or stung by nonvenomous insect an

d other nonvenomous arthropods, initial 

encounter                 May 16 2019  9:52AM        

 

                    Hypertension        May 21 2019  9:06AM  

 

                    Anemia              May 21 2019  9:06AM  

 

                    Gastritis           May 21 2019  9:06AM  

 

                    Change in bowel habit May 28 2019  8:40AM  

 

                    Dyspepsia           May 28 2019  8:40AM  

 

                    Abdominal Pain      [place] 10 2019 12:24PM  

 

                    Dyspepsia           [place] 10 2019 12:24PM  

 

                    Anemia, Iron Deficiency 2019 11:09AM  

 

                    Hypokalemia         2019 11:09AM  

 

                    Dyspepsia           2019  2:17PM  

 

                    Diarrhea            2019  2:17PM  

 

                    Biliary dyskinesia  2019  2:17PM  

 

                    Basal cell carcinoma Aug  7 2019  1:58PM  

 

                    Sebaceous cyst      Aug  7 2019  1:58PM  

 

                    Basal cell carcinoma of skin, site unspecified Aug  7 2019  

1:59PM  

 

                    Dyspnea             Oct 11 2019 10:58AM  

 

                    Chest pain          Oct 11 2019 10:58AM  

 

                    Blood tests prior to treatment or procedure Oct 14 2019 12:1

3PM  

 

                    Coronary Artery Disease 2020  3:09PM  

 

                    Hypertension        2020  3:09PM  

 

                    Hyperlipidemia      2020  3:09PM  

 

                    Pneumonia           2020  3:12PM  

 

                    Abnormal CT scan, chest 2020  3:12PM  

 

                    Anemia              2021 10:51AM  

 

                    Fatigue             2021 10:51AM  

 

                    Hypertension        2021 10:03AM  

 

                    Chest pain          2021 10:03AM  

 

                    CAD (coronary artery disease) 2021 10:03AM  

 

                    Gout                2021 10:03AM  

 

                    Anemia, Iron Deficiency 2021  3:28PM  

 

                    Anemia              Sep  3 2021 11:24AM  

 

                    Fatigue             Sep  3 2021 11:24AM  

 

                    CAD (coronary artery disease) Sep  3 2021 11:24AM  

 

                    Dyspnea             Sep  3 2021 11:24AM  

 

                    Cellulitis          Sep  3 2021 10:48AM  

 

                    Chronic Obstructive Pulmonary Disease Sep  3 2021 10:48AM  

 

                    PAD (peripheral artery disease) Sep  3 2021 10:48AM  

 

                    Anemia              Sep  7 2021  4:10PM  

 

                    Anemia              Sep 10 2021 11:12AM  







Payers





           Insurance Name Company Name Plan Name  Plan Number Policy Number Antony

cy Group 

Number                                  Start Date

 

                    Medicare RHC Medicare RHC           [id number]           N/

A

 

                    AARP      AARP                [id number]           N/A

 

                    Railroad Medicare Railroad Medicare           [id number]   

        

 

                    BCBS      [employer] [place]           [id number]           N/

A

 

                    Railroad Medicare - Railroad Railroad Medicare           [id number]

[id number]           N/A

 

                    Medicare Part A Medicare Part A           [id number]       

    N/A

 

                    Medicare Part A Medicare - Lab/Xray           [id number]   

        N/A

 

                    Medicare RHC Medicare RHC           [id number]           N/

A

 

                    Medicare Part B Medicare [place]           [id number]    

       N/A







History of Encounters





                    Visit Date          Visit Type          Provider

 

                    2021            Laboratory          Vijaya SHARP RN

 

                    9/3/2021            Office visit        Joe Conley MD

 

                    2021           Office visit        Joe Conley MD

 

                    10/11/2019          Office visit        Joe Conley MD

 

                    2019           Procedures          Mike Mantilla DO

 

                    2019            Procedures          Mike Mantilla DO

 

                    2019           Surgery             Mike Mantilla DO

 

                    2019           [facility]            CHERISE Castillo MD

 

                    2019           Surgery             Mike Mantilla DO

 

                    6/10/2019           Surgery             Mike Mantilla DO

 

                    2019           Office visit        Mike Mantilla DO

 

                    2019           Office visit        Joe Conley MD

 

                    2019           Office visit        Ailyn BEARD

 

                    2019           Office visit        Joe Conley MD

 

                    3/21/2019           Office visit        Joe Conley MD

 

                    2019            Office visit        Joe Conley MD

 

                    2019            [facility]            CHERISE Castillo MD

 

                    2018          Office visit        Joe Conley MD

 

                    2018            Office visit        Joe Conley MD

 

                    2018           Office visit        Joe Conley MD

 

                    10/12/2017          Office visit        Ailyn BEARD

 

                    2017            Office visit        Joe Conley MD

 

                    2017           Office visit        Joe Conley MD

 

                    2017            Procedures          Mike Mantilla DO

 

                    3/24/2017           Procedures          Mike Mantilla DO

 

                    3/1/2017            Office visit        Joe Conley MD

 

                    2016           Office visit        Joe Conley MD

 

                    2016           Office visit        Joe Conley MD

 

                    2015            Office visit        Joe Conley MD

 

                    2015           Office visit        ADIS SAGASTUME

 

                    2014           Office visit        ADIS SAGASTUME

 

                    2014           Office visit        Joe Conley MD

 

                    2014           Office visit        Joe Conley MD

 

                    10/7/2013           Office visit        Joe Conley MD

 

                    2013            Office visit        Joe Conley MD

 

                    3/15/2013           Office visit        Joe Conley MD

 

                    2012          Office visit        Joe Conley MD

 

                    2012           Office visit        Joe Conley MD

 

                    3/31/2011           Office visit        Joe Conley MD

 

                    3/1/2011            Office visit        Joe Conley MD

 

                    2010           Laboratory          Ramy Raines MD

 

                    2010           Office visit        Joe Conley MD

 

                    2010           Office visit        Joe Conley MD

 

                    10/5/2009           Office visit        Joe Conley MD

## 2021-10-05 NOTE — XMS REPORT
MU2 Ambulatory Summary

                             Created on: 2021



Laury Og

External Reference #: [id number]

: 1940

Sex: Female



Demographics





                          Address                   [address]

[place]  [address]

 

                          Home Phone                [phone]

 

                          Preferred Language        English

 

                          Marital Status            

 

                          [employer] Affiliation     Unknown

 

                          Race                      White

 

                          Ethnic Group              Not  or 





Author





                          Author                    Laury Conley

 

                          Organization              [facility]

[place]

 

                          Address                   [address]

[place], [place]  [id number]



 

                          Phone                     [phone]







Care Team Providers





                    Care Team Member Name Role                Phone

 

                    Joe Conley     PCP                 [phone]

 

                    Joe Conley     PreferredProvider   [phone]







Allergies and Adverse Reactions





                    Name                Reaction            Notes

 

                    erythromycin        vomiting             

 

                    STATINS: HMG-COA REDUCTASE INHIBITORS joint pain           

 

                    BANANAS             severe stomache pain  

 

                    Demerol             vomiting             







Plan of Treatment





             Planned Activity Comments     Planned Date Planned Time Plan/Goal

 

             gastritis and anemia, needing endsocpy                             

            

 

             EKG.                      2018   12:00 AM      

 

             CBC W/ AUTO DIFF (RFLX MAN DIFF IF IND).              2021     

12:00 AM      







Medications





                                        Active 

 

             Name         Start Date   Estimated Completion Date SIG          Co

mments

 

             aspirin 81 mg oral tablet                           take 1 tablet b

y oral route daily  

 

             biotin 5 mg oral capsule                           take 1 capsule b

y oral route daily  

 

                vitamin B12-folic acid 500-400 mcg oral tablet                  

               take 1 tablet by oral route 

daily                                    

 

                ibuprofen 800 mg oral tablet                                 noemi

e 1 tablet (800 mg) by oral route 3 times 

per day with food                        

 

                potassium chloride 20 mEq oral tablet extended release 2019

                       take 1 tablet

(20 meq) by oral route 2 times per day with food  

 

                Lomotil 2.5-0.025 mg oral tablet                                

 take 2 tablets (5 mg) by oral route once 

daily as needed                          

 

                Levaquin 500 mg oral tablet                                 take

 1 tablet (500 mg) by oral route once daily 

for 7 days                               

 

                hydrochlorothiazide 25 mg oral tablet 2019                

      TAKE 1 TABLET BY MOUTH EVERY 

DAY                                      

 

                HYDROCHLOROTHIAZIDE 25MG TABLETS 12/10/2020      2021      

 TAKE 1 TABLET BY MOUTH 

EVERY DAY                                

 

                clopidogrel 75 mg oral tablet                                 ta

ke 1 tablet (75 mg) by oral route once daily

for 30 days                              

 

                Iron (ferrous sulfate) 325 mg (65 mg iron) oral tablet 2021       take 

1 tablet (325 mg) by oral route once daily for 90 days  

 

                isosorbide mononitrate 30 mg oral tablet extended release 24 hr 

                                take 1 

tablet (30 mg) by oral route once daily in the morning for 90 days  

 

                melatonin 3 mg oral tablet 2021       take 

1 tablet by oral route daily

for 90 days                              

 

                nitroglycerin 0.4 mg sublingual tablet, sublingual 2021       give 1 

tablet sublingually every 5min. as needed for chest pain times 3 doses, if not 
relieved go to ER                        

 

                pantoprazole 40 mg oral tablet,delayed release (DR/EC) 2021

                       TAKE 1 TABLET

BY MOUTH EVERY DAY                       

 

                Protonix 40 mg oral tablet,delayed release (DR/EC) 2021       take 1 

tablet (40 mg) by oral route once daily for 90 days  

 

             allopurinol 300 mg oral tablet 2021                 TAKE 1 TAB

LET BY MOUTH ONCE DAILY  

 

                FERROUS SULFATE 325MG (5GR) TABS 2021      

 TAKE 1 TABLET BY MOUTH 

EVERY DAY                                

 

                carvedilol 25 mg oral tablet 9/3/2021        2022        noemi

e 1 tablet (25 mg) by oral 

route every 12 hours with food for 30 days  

 

                amoxicillin 875 mg oral tablet 9/3/2021        9/10/2021       t

irvin 1 tablet (875 mg) by oral

route every 12 hours for 7 days          







                                         

 

             Name         Start Date   Expiration Date SIG          Comments

 

             Biotin Oral                                          

 

                Zithromax Z-Jones 250 mg oral tablet 5/3/2010        2010     

   take 2 tablets (500 mg) by 

oral route once daily for 1 day then 1 tablet (250 mg) by oral route once daily 
for 4 days                               

 

                Coreg 12.5 mg oral tablet 2010        1BID -

 TAKE ONE TABLET BY MOUTH 

TWICE DAILY                              

 

                ALLOPURINOL 300MG    each 2011        2/3/2011       

 1QD - TAKE ONE TABLET BY 

MOUTH EVERY DAY                          

 

                Edarbyclor 40-12.5 mg oral tablet 4/15/2013       2013    

  take 1 tablet by oral 

route once daily                         

 

                carvedilol 12.5 mg oral tablet 2013       T

IRVIN ONE TABLET BY MOUTH TWICE

DAILY                                    

 

                gemfibrozil 600 mg oral tablet 2013       T

IRVIN ONE TABLET BY MOUTH TWICE

DAILY (BEFORE  MORNING  AND  EVENING  MEALS)  

 

                losartan-hydrochlorothiazide 100-25 mg oral tablet 2013    

   10/16/2013      TAKE ONE

TABLET BY MOUTH IN THE MORNING           

 

                Levaquin 500 mg oral tablet 2013      take

 1 tablet (500 mg) by oral 

route once daily for 7 days              

 

                Sporanox 100 mg oral capsule                                 noemi

e 1 capsule (100 mg) by oral route once 

daily with food                          

 

                Sunscreen SPF 30 2015                       apply q2h while

 outdoors, q1h if swimming or 

sweating                                 

 

             Vaseline     2015                 apply to treated area  

 

                urea 40 % topical cream 2015                       apply to

 the affected area(s) by topical 

route once a day                         

 

                econazole 1 % topical cream 2015                       appl

y to the affected and surrounding 

areas of skin by topical route once daily  

 

                Zetia 10 mg oral tablet                                 take 1 t

ablet (10 mg) by oral route once daily for 

30 days                                  

 

                amoxicillin 875 mg oral tablet                                 t

irvin 1 tablet (875 mg) by oral route every 12

hours for 10 days, finish 16        

 

                Nexium 40 mg oral capsule,delayed release(DR/EC) 2016      

 10/24/2016      take 1 

capsule (40 mg) by oral route once daily for 4 weeks  

 

                Keflex 500 mg oral capsule 4/3/2017        2017       take 

1 capsule (500 mg) by oral 

route every 12 hours for 14 days         

 

                Cipro 500 mg oral tablet                                 take 1 

tablet (500 mg) by oral route every 12 hours

for 7 days                               

 

                Hyzaar 100-25 mg oral tablet 2018       noemi

e 1 tablet by oral route once

daily for 30 days                        

 

                Naprosyn 500 mg oral tablet 2018       take

 1 tablet (500 mg) by oral 

route 2 times per day with food for 14 days  

 

                Cipro 250 mg oral tablet                                 take 1 

tablet (250 mg) by oral route every 12 hours

for 5 days                               

 

                Zithromax 500 mg oral tablet 3/21/2019       3/28/2019       noemi

e 1 tablet (500 mg) by oral 

route once daily for 7 days              

 

                Carafate 1 gram oral tablet 2019       take

 1 tablet by oral route 4 

times a day for 14 days                  

 

                Suprep Bowel Prep Kit 17.5-3.13-1.6 gram oral recon soln 20

19                       take as 

directed                                 

 

                Augmentin 875-125 mg oral tablet 10/11/2019      10/18/2019     

 take 1 tablet by oral 

route every 12 hours for 7 days          

 

                gemfibrozil 600 mg oral tablet 2019        T

IRVIN 1 TABLET BY MOUTH TWICE 

DAILY BEFORE MORNING AND EVENING MEALS for 30 days  







                                        Discontinued 

 

             Name         Start Date   Discontinued Date SIG          Comments

 

                Vaseretic 10-25 mg oral tablet                 3/1/2011        t

irvin 1 tablet by oral route once daily

                                         

 

                Omnaris 50 mcg nasal spray,non-aerosol 2010 

       inhale 2 sprays (100 

mcg) in each nostril by intranasal route once daily  

 

                Mobic 15 mg oral tablet 2010       3/1/2011        take 1 t

ablet (15 mg) by oral route 

once daily                               

 

                Cipro 500 mg oral tablet                 2013        take 1 

tablet (500 mg) by oral route every 

12 hours for 7 days                      

 

                amoxicillin 500 mg oral capsule                 2013        

take 1 capsule (500 mg) by oral route

3 times per day x 7days                  

 

                Levaquin 500 mg oral tablet                 2013        take

 1 tablet (500 mg) by oral route once

daily for 7 days                         

 

                hydrocortisone 2.5 % topical cream 2014    

   apply to the affected 

area(s) by topical route once daily      

 

                losartan-hydrochlorothiazide 100-25 mg oral tablet 10/13/2014   

   2015        TAKE ONE 

TABLET BY MOUTH IN THE MORNING           

 

                Hyzaar 100-25 mg oral tablet 2017        noemi

e 1 tablet by oral route once

daily for 30 days                        

 

                Diflucan 150 mg oral tablet                 2018       take

 1 tablet (150 mg) by oral route 

once                                     

 

                Macrobid 100 mg oral capsule 10/12/2017      2018       noemi

e 1 capsule (100 mg) by 

oral route every 12 hours with food for 7 days  

 

                Cipro 500 mg oral tablet 10/17/2017      2018       take 1 

tablet (500 mg) by oral 

route every 12 hours for 7 days          

 

                Iron (ferrous sulfate) 325 mg (65 mg iron) oral tablet          

       2021       take 1 tablet

(325 mg) by oral route 2 times per day   

 

                Dexilant 60 mg oral capsule,biphase delayed releas 2019       take 1 

capsule (60 mg) by oral route once daily  







Problem List





                    Description         Status              Onset

 

                    Hypertension        Active               

 

                    Gout                Active               

 

                    Heart disease       Active               

 

                    Hyperlipidemia      Active               

 

                    Arthritis unspecified Active               

 

                    Actinic Keratosis   Active              2015

 

                    Onychomycosis       Active              2015

 

                    Seborrheic keratoses Active              2015

 

                    BCC (basal cell carcinoma of skin) Active              

 

                    Keratosis           Active              2017

 

                    Dyspepsia           Active              2019

 

                    Change in bowel habit Active              2019







Vital Signs





     Date Time BP-Sys(mm[Hg] BP-Ester(mm[Hg]) HR(bpm) RR(rpm) Temp WT   HT   HC   

BMI  BSA  BMI

 Percentile                             O2 Sat(%)

 

        9/3/2021 10:48:00  mm[Hg] 70 mm[Hg] 74 {beats}/min 20 rpm  98.1 F 

 161.5 lbs

           66 in                 26.0665 kg/m2 1.847 m2              97 %

 

        2021 10:04:00  mm[Hg] 72 mm[Hg] 70 {beats}/min 18 rpm  98.6 F

  150.25 

lbs        66 in                 24.25 kg/m2 1.78 m2               98 %

 

        10/11/2019 10:55:00  mm[Hg] 60 mm[Hg] 65 {beats}/min 18 rpm  97.9 

F  141 lbs

           66 in                 22.7578 kg/m2 1.7258 m2             98 %

 

        2019 1:58:00  mm[Hg] 72 mm[Hg] 65 {beats}/min 20 rpm  97 F    

145.5 lbs 66

 in                       23.48 kg/m2  1.75 m2                    

 

        2019 8:53:00  mm[Hg] 63 mm[Hg] 70 {beats}/min 70 rpm  97.5 F 

 145.5 lbs

           66 in                 23.4841 kg/m2 1.7531 m2              

 

        2019 8:47:00  mm[Hg] 63 mm[Hg] 75 {beats}/min 20 rpm  98.8 F 

 150 lbs 

66 in                     24.21 kg/m2  1.78 m2                    

 

        2019 9:02:00  mm[Hg] 78 mm[Hg] 65 {beats}/min 18 rpm  98.4 F 

 156.375 

lbs        67 in                 24.4915 kg/m2 1.8311 m2             98 %

 

        2019 9:50:00  mm[Hg] 68 mm[Hg] 68 {beats}/min 18 rpm  97.9 F 

 156 lbs 

67 in                     24.43 kg/m2  1.83 m2                   100 %

 

        2019 9:10:00  mm[Hg] 70 mm[Hg] 65 {beats}/min 18 rpm  97.2 F 

 158.5 lbs

           67 in                 24.8244 kg/m2 1.8435 m2             97 %

 

        3/21/2019 8:53:00  mm[Hg] 72 mm[Hg] 80 {beats}/min 20 rpm  97.5 F 

 154.375 

lbs        67 in                 24.18 kg/m2 1.82 m2               96 %

 

        2019 8:54:00  mm[Hg] 72 mm[Hg] 64 {beats}/min 14 rpm  97.9 F  

156 lbs 67

 in                       24.4328 kg/m2 1.8289 m2                 98 %

 

        2018 11:03:00  mm[Hg] 70 mm[Hg] 68 {beats}/min 16 rpm  98.1 

F  163 lbs

           67 in                 25.53 kg/m2 1.87 m2               98 %

 

        2018 8:39:00  mm[Hg] 74 mm[Hg] 65 {beats}/min 18 rpm  97.7 F  

171.25 lbs

           67 in                 26.8213 kg/m2 1.9163 m2             97 %

 

        2018 9:06:00  mm[Hg] 92 mm[Hg] 62 {beats}/min 16 rpm  96.1 F 

 169 lbs 

67 in                     26.47 kg/m2  1.90 m2                   97 %

 

        10/12/2017 9:18:00  mm[Hg] 70 mm[Hg] 61 {beats}/min 14 rpm  97.3 F

  168.25 

lbs        67 in                 26.3514 kg/m2 1.8994 m2             99 %

 

        2017 8:13:00  mm[Hg] 76 mm[Hg] 69 {beats}/min 18 rpm  97.3 F  

172 lbs 67

 in                       26.94 kg/m2  1.92 m2                   98 %

 

        2017 11:06:00  mm[Hg] 98 mm[Hg] 62 {beats}/min 16 rpm  96.9 F

  178 lbs 

67 in                     27.8785 kg/m2 1.9537 m2                 9 %

 

        2017 10:35:00  mm[Hg] 88 mm[Hg] 67 {beats}/min 20 rpm  97.8 F 

 179 lbs 

67 in                     28.04 kg/m2  1.96 m2                    

 

        3/24/2017 9:50:00  mm[Hg] 88 mm[Hg] 67 {beats}/min 20 rpm  97.8 F 

 179 lbs 

67 in                     28.0351 kg/m2 1.9591 m2                  

 

        3/1/2017 9:25:00  mm[Hg] 78 mm[Hg] 62 {beats}/min 17 rpm  97.6 F  

180 lbs 67

 in                       28.19 kg/m2  1.96 m2                   97 %

 

        2016 10:56:00  mm[Hg] 88 mm[Hg] 60 {beats}/min 18 rpm  97.6 F

  179 lbs 

67 in                     28.0351 kg/m2 1.9591 m2                 96 %

 

        2016 8:24:00  mm[Hg] 70 mm[Hg] 72 {beats}/min 18 rpm  97.5 F 

 174 lbs 

67 in                     27.25 kg/m2  1.93 m2                   99 %

 

        2015 1:40:00  mm[Hg] 72 mm[Hg] 57 {beats}/min 18 rpm  98.5 F  

191.25 lbs

           67 in                 29.9537 kg/m2 2.0251 m2             96 %

 

        2014 2:11:00  mm[Hg] 64 mm[Hg] 74 {beats}/min 20 rpm  97.6 F 

 205 lbs 

67 in                     32.11 kg/m2  2.10 m2                    

 

        2014 9:28:00  mm[Hg] 80 mm[Hg] 72 {beats}/min 18 rpm  96.8 F 

 203 lbs 

67 in                     31.794 kg/m2 2.0863 m2                  

 

        10/7/2013 2:22:00  mm[Hg] 75 mm[Hg] 70 {beats}/min 18 rpm  98 F   

 206 lbs 67 

in                        32.26 kg/m2  2.10 m2                   95 %

 

        2013 9:42:00  mm[Hg] 72 mm[Hg] 68 {beats}/min 18 rpm  97.4 F  

205 lbs 67

 in                       32.1072 kg/m2 2.0966 m2                  

 

        3/15/2013 10:41:00  mm[Hg] 70 mm[Hg] 60 {beats}/min 18 rpm  97.2 F

  201 lbs 

66 in                     32.44 kg/m2  2.06 m2                    

 

        2012 1:46:00  mm[Hg] 78 mm[Hg] 60 {beats}/min 18 rpm  97.2 F

  203 lbs 

                                                                  

 

        2012 8:48:00  mm[Hg] 80 mm[Hg] 62 {beats}/min 18 rpm  97 F   

 207 lbs 66 

in                        33.41 kg/m2  2.091 m2                   

 

        3/31/2011 9:58:00  mm[Hg] 82 mm[Hg] 68 {beats}/min 20 rpm  97.2 F 

 203 lbs  

                                                                  

 

      3/1/2011 9:05:00  mm[Hg] 74 mm[Hg] 64 {beats}/min 20 rpm 97 F  203 l

bs             

                                                             

 

      2010 9:03:00  mm[Hg] 72 mm[Hg] 58 {beats}/min 20 rpm       203 

lbs                   

                                                     

 

        2010 8:40:00  mm[Hg] 88 mm[Hg] 68 {beats}/min 18 rpm  96.9 F 

 209 lbs  

                                                                  







Social History





                    Name                Description         Comments

 

                    denies alcohol use                       

 

                    Lives with spouse                        

 

                    Tobacco             Never smoker         







History of Procedures





                    Date Ordered        Description         Order Status

 

                    2016 12:00 AM  COMPLETE CBC W/AUTO DIFF WBC Reviewed

 

                    2016 12:00 AM  COMPREHEN METABOLIC PANEL Reviewed

 

                    2016 12:00 AM  LIPID PANEL         Reviewed

 

                    2016 12:00 AM  GLYCOSYLATED HEMOGLOBIN TEST Reviewed

 

                    2012 12:00 AM  COMPLETE CBC W/AUTO DIFF WBC Reviewed

 

                    2012 12:00 AM  COMPREHEN METABOLIC PANEL Reviewed

 

                    2012 12:00 AM  LIPID PANEL         Reviewed

 

                    2012 12:00 AM  GLYCOSYLATED HEMOGLOBIN TEST Reviewed

 

                    2012 12:00 AM  ASSAY THYROID STIM HORMONE Reviewed

 

                    2012 12:00 AM  ASSAY OF BLOOD/URIC ACID Reviewed

 

                    2012 12:00 AM  URINALYSIS AUTO W/SCOPE Reviewed

 

                    3/1/2017 12:00 AM   COMPLETE CBC W/AUTO DIFF WBC Reviewed

 

                    3/1/2017 12:00 AM   COMPREHEN METABOLIC PANEL Reviewed

 

                    3/1/2017 12:00 AM   LIPID PANEL         Reviewed

 

                    3/1/2017 12:00 AM   ASSAY THYROID STIM HORMONE Reviewed

 

                    3/1/2017 12:00 AM   VITAMIN B-12        Reviewed

 

                    3/24/2017 12:00 AM  CMPLX RPR F/C/C/M/N/AX/G/H/F Reviewed

 

                    2017 12:00 AM   COMPLETE CBC W/AUTO DIFF WBC Returned

 

                    2017 12:00 AM   COMPREHEN METABOLIC PANEL Returned

 

                    2017 12:00 AM   ASSAY THYROID STIM HORMONE Returned

 

                    2017 12:00 AM   VITAMIN B-12        Returned

 

                    2017 12:00 AM   ASSAY OF BLOOD/URIC ACID Returned

 

                    2017 12:00 AM   X-RAY EXAM OF HAND  Returned

 

                    2017 12:00 AM   ASSAY OF PREALBUMIN Returned

 

                    10/12/2017 12:00 AM URINE CULTURE/COLONY COUNT Reviewed

 

                    10/12/2017 9:44 AM  URINALYSIS AUTO W/O SCOPE Reviewed

 

                    2018 12:00 AM  COMPLETE CBC W/AUTO DIFF WBC Returned

 

                    2018 12:00 AM  COMPREHEN METABOLIC PANEL Returned

 

                    2018 12:00 AM  LIPID PANEL         Returned

 

                    2018 12:00 AM  GLYCOSYLATED HEMOGLOBIN TEST Returned

 

                    2018 12:00 AM  ASSAY OF BLOOD/URIC ACID Returned

 

                    2018 12:00 AM  ASSAY OF URINE/URIC ACID Returned

 

                    3/4/2013 12:00 AM   CHEST X-RAY 2VW FRONTAL&LATL Reviewed

 

                    2010 12:00 AM  COMPLETE CBC W/AUTO DIFF WBC Reviewed

 

                    2010 12:00 AM  COMPREHEN METABOLIC PANEL Reviewed

 

                    2010 12:00 AM  LIPID PANEL         Reviewed

 

                    2010 12:00 AM  ASSAY OF BLOOD/URIC ACID Reviewed

 

                    2010 12:00 AM  GLYCOSYLATED HEMOGLOBIN TEST Reviewed

 

                    2018 12:00 AM CHEST X-RAY 2VW FRONTAL&LATL Returned

 

                    2018 12:00 AM COMPLETE CBC W/AUTO DIFF WBC Returned

 

                    2018 12:00 AM COMPREHEN METABOLIC PANEL Returned

 

                    2018 12:00 AM ASSAY OF TROPONIN QUANT Returned

 

                    2013 12:00 AM   COMPLETE CBC W/AUTO DIFF WBC Reviewed

 

                    2013 12:00 AM   COMPREHEN METABOLIC PANEL Reviewed

 

                    2013 12:00 AM   GLYCOSYLATED HEMOGLOBIN TEST Reviewed

 

                    2019 12:00 AM  METABOLIC PANEL TOTAL CA Returned

 

                    2019 12:00 AM  CT ABD & PELV W/CONTRAST Returned

 

                    2019 12:00 AM   US EXAM ABDO BACK WALL COMP Returned

 

                    2019 3:40 PM   URINALYSIS AUTO W/O SCOPE Reviewed

 

                    2019 12:00 AM  COMPLETE CBC W/AUTO DIFF WBC Reviewed

 

                    2019 12:00 AM  COMPREHEN METABOLIC PANEL Reviewed

 

                    2019 12:00 AM  ELECTROCARDIOGRAM TRACING Reviewed

 

                    2019 12:00 AM  ASSAY OF TROPONIN QUANT Reviewed

 

                    2019 12:00 AM  FECES CULTURE AEROBIC BACT Reviewed

 

                    2019 12:00 AM  C DIFF AMPLIFIED PROBE Reviewed

 

                    2019 12:00 AM  Tick Panel          Reviewed

 

                    2019 12:00 AM  METABOLIC PANEL TOTAL CA Returned

 

                    2019 12:00 AM  COMPLETE CBC W/AUTO DIFF WBC Returned

 

                    2019 12:00 AM  VITAMIN B-12        Returned

 

                    2019 12:00 AM  ASSAY OF IRON       Returned

 

                    6/10/2019 12:00 AM  ECHO EXAM OF ABDOMEN Reviewed

 

                    6/10/2019 12:00 AM  HEPATOBILIARY SYSTEM IMAGING Reviewed

 

                    2019 12:00 AM  METABOLIC PANEL TOTAL CA Returned

 

                    2019 12:00 AM  ASSAY OF IRON       Returned

 

                    10/11/2019 12:00 AM CT THORAX W/DYE     Returned

 

                    10/14/2019 12:00 AM METABOLIC PANEL TOTAL CA Returned

 

                    2020 12:00 AM   COMPLETE CBC W/AUTO DIFF WBC Returned

 

                    2020 12:00 AM   COMPREHEN METABOLIC PANEL Returned

 

                    2020 12:00 AM   LIPID PANEL         Returned

 

                    2020 12:00 AM   CT THORAX W/DYE     Returned

 

                    2021 12:00 AM  COMPLETE CBC W/AUTO DIFF WBC Returned

 

                    2021 12:00 AM  COMPREHEN METABOLIC PANEL Returned

 

                    2021 12:00 AM  ASSAY THYROID STIM HORMONE Returned

 

                    2021 12:00 AM  ASSAY OF TROPONIN QUANT Returned

 

                    2021 12:00 AM  ASSAY OF BLOOD/URIC ACID Returned

 

                    2021 12:00 AM  ASSAY OF IRON       Returned

 

                    2021 12:00 AM  IRON BINDING TEST   Returned

 

                    2021 12:00 AM  ASSAY OF TRANSFERRIN Returned

 

                    2021 12:00 AM  VITAMIN B-12        Returned

 

                    2021 12:00 AM  COMPLETE CBC W/AUTO DIFF WBC Returned

 

                    2021 12:00 AM  METABOLIC PANEL TOTAL CA Returned

 

                    9/3/2021 12:00 AM   COMPLETE CBC W/AUTO DIFF WBC Returned

 

                    9/3/2021 12:00 AM   COMPREHEN METABOLIC PANEL Returned

 

                    9/3/2021 12:00 AM   ASSAY OF NATRIURETIC PEPTIDE Returned

 

                    9/3/2021 12:00 AM   ASSAY THYROID STIM HORMONE Returned

 

                    9/3/2021 12:00 AM   ASSAY OF BLOOD/URIC ACID Returned

 

                    9/3/2021 12:00 AM   ASSAY OF MAGNESIUM  Returned

 

                    9/3/2021 12:00 AM   ASSAY OF IRON       Returned

 

                    9/3/2021 12:00 AM   IRON BINDING TEST   Returned

 

                    9/3/2021 12:00 AM   ASSAY OF TRANSFERRIN Returned

 

                    9/3/2021 12:00 AM   VITAMIN B-12        Returned

 

                    2/15/2011 12:00 AM  ROUTINE VENIPUNCTURE Reviewed

 

                    2/15/2011 12:00 AM  COMPREHEN METABOLIC PANEL Reviewed

 

                    2/15/2011 12:00 AM  LIPID PANEL         Reviewed

 

                    2/15/2011 12:00 AM  ASSAY OF BLOOD/URIC ACID Reviewed

 

                    2010 12:00 AM  TTE W/O DOPPLER COMPLETE Reviewed

 

                    2015 12:00 AM  DESTRUCT PREMALG LESION Reviewed

 

                    2015 12:00 AM   COMPLETE CBC W/AUTO DIFF WBC Reviewed

 

                    2015 12:00 AM   COMPREHEN METABOLIC PANEL Reviewed

 

                    2015 12:00 AM   LIPID PANEL         Reviewed

 

                    2015 12:00 AM   GLYCOSYLATED HEMOGLOBIN TEST Reviewed

 

                    2015 12:00 AM   ASSAY OF BLOOD/URIC ACID Reviewed







Results Summary





                          Date and Description      Results

 

                          2010 9:45 AM         GLYCOHEMOGLOBIN A1C 6.30 %UR

IC ACID 5.3 mg/dLTRIGLYCERIDES 

90.0 mg/dLCHOLESTEROL 209.0 mg/dLHDL 43.0 mg/dLTOT CHOL/HDL 4.9 LDL (CALC) 148.0
 mg/dLWBC 10.0 RBC 4.75 HGB 14.10 g/dLHCT 41.0 %MCV 86.0 fLMCH 29.70 pgMCHC 
34.40 g/dLRDW SD 45 RDW CV 14.50 %MPV 10.40 fLPLT 354 NRBC# 0.00 NRBC% 0.0 %NEUT
 63.60 %%LYMP 21.60 %%MONO 5.90 %%EOS 8.30 %%BASO 0.60 %#NEUT 6.35 #LYMP 2.16 
#MONO 0.59 #EOS 0.83 #BASO 0.06 MANUAL DIFF NOT IND GLUCOSE 112.0 mg/dLSODIUM 
142.0 mmol/LPOTASSIUM 3.90 mmol/LCHLORIDE 103.0 mmol/LCO2 28.0 mmol/LBUN 20.0 
mg/dLCREATININE 0.90 mg/dLSGOT/AST 23.0 IU/LSGPT/ALT 13.0 IU/LALK PHOS 75.0 
IU/LTOTAL PROTEIN 6.90 g/dLALBUMIN 4.30 g/dLTOTAL BILI 0.50 mg/dLCALCIUM 9.0 
mg/dLAGE 70 GFR NonAA 62 GFR AA 75 eGFR >60 mL/min/1.73 m2eGFR AA* >60 

 

                          2/15/2011 9:15 AM         TRIGLYCERIDES 97.0 mg/dLCHOL

ESTEROL 212.0 mg/dLHDL 48.0 

mg/dLTOT CHOL/HDL 4.4 LDL (CALC) 145.0 mg/dLURIC ACID 5.3 mg/dLGLUCOSE 110.0 
mg/dLSODIUM 141.0 mmol/LPOTASSIUM 4.10 mmol/LCHLORIDE 102.0 mmol/LCO2 29.0 
mmol/LBUN 18.0 mg/dLCREATININE 0.90 mg/dLSGOT/AST 31.0 IU/LSGPT/ALT 17.0 IU/LALK
 PHOS 74.0 IU/LTOTAL PROTEIN 7.60 g/dLALBUMIN 4.30 g/dLTOTAL BILI 0.30 
mg/dLCALCIUM 10.20 mg/dLAGE 71 GFR NonAA 62 GFR AA 75 eGFR >60 mL/min/1.73 
m2eGFR AA* >60 

 

                          2012 9:55 AM         WBC 8.7 RBC 4.63 HGB 13.40 g

/dLHCT 40.70 %MCV 88.0 fLMCH 28.90

 pgMCHC 32.90 g/dLRDW SD 47 RDW CV 14.70 %MPV 10.20 fLPLT 388 NRBC# 0.00 NRBC% 
0.0 %NEUT 59.90 %%LYMP 22.40 %%MONO 5.60 %%EOS 11.50 %%BASO 0.60 %#NEUT 5.20 
#LYMP 1.94 #MONO 0.49 #EOS 1.00 #BASO 0.05 MANUAL DIFF NOT IND GLUCOSE 114.0 
mg/dLSODIUM 142.0 mmol/LPOTASSIUM 3.80 mmol/LCHLORIDE 104.0 mmol/LCO2 28.0 
mmol/LBUN 23.0 mg/dLCREATININE 0.90 mg/dLSGOT/AST 20.0 IU/LSGPT/ALT 15.0 IU/LALK
 PHOS 69.0 IU/LTOTAL PROTEIN 7.20 g/dLALBUMIN 4.30 g/dLTOTAL BILI 0.30 
mg/dLCALCIUM 9.90 mg/dLAGE 72 GFR NonAA 62 GFR AA 75 eGFR >60 mL/min/1.73 m2eGFR
 AA* >60 TRIGLYCERIDES 94.0 mg/dLCHOLESTEROL 217.0 mg/dLHDL 43.0 mg/dLTOT 
CHOL/HDL 5.0 LDL (CALC) 155.0 mg/dLURIC ACID 5.6 mg/dLTSH 1.920 
uIU/mLGLYCOHEMOGLOBIN A1C 6.30 %

 

                          2012 2:42 PM         COLOR YELLOW APPEARANCE HAZY

 SPEC GRAV 1.025 pH 5.5 PROTEIN 

NEGATIVE GLUCOSE NEGATIVE KETONE NEGATIVE BILIRUBIN NEGATIVE BLOOD TRACE-INTACT 
NITRITE NEGATIVE LEUK SCREEN MODERATE WBC/HPF  RBC/HPF 0-5 CASTS/LPF 
NEGATIVE CRYSTALS NEGATIVE MUCOUS THRDS NEGATIVE BACTERIA 1+ EPITH CELLS 1+ 
RENAL TRICHOMONAS NEGATIVE YEAST NEGATIVE CULT SET UP? YES 

 

                          2013 10:48 AM         WBC 10.3 RBC 4.50 HGB 13.30 

g/dLHCT 39.10 %MCV 87.0 fLMCH 

29.60 pgMCHC 34.0 g/dLRDW SD 46 RDW CV 14.60 %MPV 9.80 fLPLT 354 NRBC# 0.00 
NRBC% 0.0 %NEUT 63.60 %%LYMP 23.40 %%MONO 4.90 %%EOS 7.70 %%BASO 0.40 %#NEUT 
6.56 #LYMP 2.42 #MONO 0.51 #EOS 0.79 #BASO 0.04 MANUAL DIFF NOT IND HGB A1C 6.20
 %Est Avg Glucose 131.2 mg/dLGLUCOSE 90.0 mg/dLSODIUM 142.0 mmol/LPOTASSIUM 3.80
 mmol/LCHLORIDE 102.0 mmol/LCO2 29.0 mmol/LBUN 19.0 mg/dLCREATININE 0.90 
mg/dLSGOT/AST 24.0 IU/LSGPT/ALT 17.0 IU/LALK PHOS 77.0 IU/LTOTAL PROTEIN 7.30 
g/dLALBUMIN 4.20 g/dLTOTAL BILI 0.40 mg/dLCALCIUM 10.60 mg/dLAGE 73 GFR NonAA 62
 GFR AA 75 eGFR 60 eGFR AA* 60 

 

                          4/10/2015 9:58 AM         WBC 8.5 RBC 4.40 HGB 12.90 g

/dLHCT 39.40 %MCV 90.0 fLMCH 29.30

 pgMCHC 32.70 g/dLRDW SD 47 RDW CV 14.40 %MPV 9.90 fLPLT 393 NRBC# 0.00 NRBC% 
0.0 %NEUT 56.0 %%LYMP 25.60 %%MONO 5.40 %%EOS 12.50 %%BASO 0.50 %#NEUT 4.75 
#LYMP 2.17 #MONO 0.46 #EOS 1.06 #BASO 0.04 MANUAL DIFF NOT IND GLUCOSE 123.0 
mg/dLSODIUM 142.0 mmol/LPOTASSIUM 4.10 mmol/LCHLORIDE 105.0 mmol/LCO2 26.0 
mmol/LBUN 22.0 mg/dLCREATININE 1.10 mg/dLSGOT/AST 20.0 IU/LSGPT/ALT 15.0 IU/LALK
 PHOS 94.0 IU/LTOTAL PROTEIN 7.70 g/dLALBUMIN 4.20 g/dLTOTAL BILI 0.30 
mg/dLCALCIUM 10.50 mg/dLAGE 75 GFR NonAA 49 GFR AA 59 eGFR 49 eGFR AA* 59 
TRIGLYCERIDES 111.0 mg/dLCHOLESTEROL 201.0 mg/dLHDL 35.0 mg/dLTOT CHOL/HDL 5.7 
LDL (CALC) 144.0 mg/dLURIC ACID 4.7 mg/dLHGB A1C 6.30 %Est Avg Glucose 134.1 
mg/dL

 

                          2016 8:47 AM         WBC 7.8 RBC 4.67 HGB 13.50 g

/dLHCT 40.90 %MCV 88.0 fLMCH 28.90

 pgMCHC 33.0 g/dLRDW SD 46 RDW CV 14.50 %MPV 10.30 fLPLT 360 NRBC# 0.00 NRBC% 
0.0 %NEUT 59.50 %%LYMP 25.90 %%MONO 6.10 %%EOS 7.90 %%BASO 0.60 %#NEUT 4.65 
#LYMP 2.03 #MONO 0.48 #EOS 0.62 #BASO 0.05 MANUAL DIFF NOT IND GLUCOSE 115.0 
mg/dLSODIUM 144.0 mmol/LPOTASSIUM 3.90 mmol/LCHLORIDE 99.0 mmol/LCO2 30.0 
mmol/LBUN 30.0 mg/dLCREATININE 1.30 mg/dLSGOT/AST 19.0 IU/LSGPT/ALT 14.0 IU/LALK
 PHOS 96.0 IU/LTOTAL PROTEIN 6.90 g/dLALBUMIN 4.40 g/dLTOTAL BILI 0.40 
mg/dLCALCIUM 10.80 mg/dLAGE 76 GFR NonAA 40 GFR AA 48 eGFR 40 eGFR AA* 48 
TRIGLYCERIDES 127.0 mg/dLCHOLESTEROL 204.0 mg/dLHDL 42.0 mg/dLTOT CHOL/HDL 4.9 
LDL (CALC) 137.0 mg/dLHemoglobin A1c 6.30 %Estim. Avg Glu (eAG) 134 

 

                          3/1/2017 9:48 AM          GLUCOSE 101.0 mg/dLSODIUM 14

3.0 mmol/LPOTASSIUM 4.10 

mmol/LCHLORIDE 108.0 mmol/LCO2 25.0 mmol/LBUN 20.0 mg/dLCREATININE 1.20 
mg/dLSGOT/AST 19.0 IU/LSGPT/ALT 10.0 IU/LALK PHOS 96.0 IU/LTOTAL PROTEIN 7.60 
g/dLALBUMIN 4.0 g/dLTOTAL BILI 0.30 mg/dLCALCIUM 10.0 mg/dLAGE 77 GFR NonAA 44 
GFR AA 53 eGFR 44 eGFR AA* 53 TRIGLYCERIDES 61.0 mg/dLCHOLESTEROL 190.0 mg/dLHDL
 40.0 mg/dLTOT CHOL/HDL 4.8 LDL (CALC) 138.0 mg/dLWBC 10.8 RBC 4.50 HGB 13.10 
g/dLHCT 38.90 %MCV 86.0 fLMCH 29.10 pgMCHC 33.70 g/dLRDW SD 45 RDW CV 14.40 %MPV
 9.70 fLPLT 341 NRBC# 0.00 NRBC% 0.0 %NEUT 66.20 %%LYMP 15.80 %%MONO 4.80 %%EOS 
12.30 %%BASO 0.50 %#NEUT 7.19 #LYMP 1.71 #MONO 0.52 #EOS 1.33 #BASO 0.05 MANUAL 
DIFF NOT IND TSH 1.950 uIU/mLVITAMIN B12 331.0 pg/mL

 

                          10/12/2017 9:44 AM        Color Ur lt. yellow Glucose 

Ur-sCnc neg Bilirub Ur Ql Strip 

neg Ketones Ur Ql Strip neg Sp Gr Ur Qn 1.015 Hgb Ur Ql Strip neg pH Ur-LsCnc 
5.5 Prot Ur Ql Strip trace Urobilinogen Ur-mCnc 0.2eu/dl Nitrite Ur Ql Strip neg
 WBC Est Ur Ql Strip large 

 

                          2018 2:05 PM        Falls in last 6 months? No U

nsteady or worry about falling? 

Yes Fall Risk Assessment At Risk 

 

                          2019 10:44 AM        WBC 8.0 RBC 4.11 HGB 10.70 g

/dLHCT 34.60 %MCV 84.0 fLMCH 26.0

 pgMCHC 30.90 g/dLRDW SD 48 fLRDW CV 15.70 %MPV 9.90 fLPLT 517 NRBC# 0.00 NRBC% 
0.0 %NEUT 75.1 %LYMP 14.4 %MONO 5.9 %EOS 3.9 %BASO 0.4 #NEUT 6.02 #LYMP 1.15 
#MONO 0.47 #EOS 0.31 #BASO 0.03 MANUAL DIFF NOT IND GLUCOSE 101 SODIUM 142 
POTASSIUM 3.9 CHLORIDE 107.0 mmol/LCO2 27 BUN 18.0 mg/dLCREATININE 1.040 
mg/dLSGOT/AST 13 SGPT/ALT 6 ALK PHOS 111 TOTAL PROTEIN 7.5 ALBUMIN 3.5 TOTAL 
BILI 0.2 CALCIUM 10.20 mg/dLAGE 79 GFR NonAA 51 GFR AA 62 eGFR 51 eGFR AA* >60 
mL/min/1.73 [id number] AD 0.06 TOXIGENIC C DIFF NEGATIVE 027 NAP1 STRAIN 
PRESUMPTIVE NEGATIVE Lyme IgG/IgM Ab <0.91 Lyme Disease Ab, Quant,IgM WILL 
FOLLOW RMSF, IgG, EIA WILL FOLLOW [facility] M[employer] Spotted Fever,IgM WILL FOLLOW E. 
chaffeensis (HME) IgGTiter WILL FOLLOW E. chaffeensis (HME) IgMTiter WILL FOLLOW
 

 

                          2019 3:40 PM         Color Ur lt. yellow Glucose 

Ur-sCnc neg Bilirub Ur Ql Strip 

neg Ketones Ur Ql Strip neg Sp Gr Ur Qn 1.010 Hgb Ur Ql Strip neg pH Ur-LsCnc 
5.0 Prot Ur Ql Strip 100mg/dl Urobilinogen Ur-mCnc 0.2eu/dl Nitrite Ur Ql Strip 
neg WBC Est Ur Ql Strip neg 







History Of Immunizations





      Name  Date Admin Mfg Name Mfg Code Trade Name Lot#  Route Inj   Vis Given 

Vis Pub 

CVX

 

        Pneumococcal 10/1/2019 Not Entered NE      PREVNAR 13 [id number]  Intramuscu

lar Left Arm 

10/1/2019                 1                  133

 

        Influenza 10/1/2019 Not Entered NE      FLUZONE-HIGH DOSE [id number] Intram

uscular Left 

Arm                 10/1/2019           1            135

 

        Influenza 2020 Not Entered NE      FLUZONE-HIGH DOSE         Not Ent

ered Not Entered 

1                  135







History of Past Illness





                    Name                Date of Onset       Comments

 

                    Hypertension        2010  8:42AM  

 

                    Hyperlipidemia, unspecified 2010  8:42AM  

 

                    Arthritis unspecified                      

 

                    Heart disease                            

 

                    Hypertension                             

 

                    Hyperlipidemia                           

 

                    Gout                                     

 

                    Hypertension        2010  9:09AM  

 

                    Gout                2010  9:09AM  

 

                    Osteoarthritis      2010  9:09AM  

 

                    Hyperglycemia       2010  9:09AM  

 

                    Onychodystrophy                          

 

                    Contact Dermatitis                       

 

                    Actinic Keratosis   2015           

 

                    Onychomycosis       2015           

 

                    Seborrheic keratoses 2015           

 

                    Coronary Artery Disease Feb 15 2011  9:03AM  

 

                    Hypertension        Feb 15 2011  9:03AM  

 

                    Hyperlipidemia      Feb 15 2011  9:03AM  

 

                    Gout                Feb 15 2011  9:03AM  

 

                    Hypertension        Mar  1 2011  9:06AM  

 

                    Hyperlipidemia, unspecified Mar  1 2011  9:06AM  

 

                    Gout                Mar  1 2011  9:06AM  

 

                    Hypertension        Mar 31 2011 10:01AM  

 

                    Headache            Mar 31 2011 10:01AM  

 

                    BCC (basal cell carcinoma of skin) 2017           

 

                    Keratosis           2017           

 

                    Dyspepsia           2019           

 

                    Change in bowel habit 2019           

 

                    Hypertension        2012  8:54AM  

 

                    Hyperlipidemia, unspecified 2012  8:54AM  

 

                    Diabetes Mellitus, Type II 2012  8:54AM  

 

                    Gout                2012  8:54AM  

 

                    Night sweats        2012  8:54AM  

 

                    Dysuria             May 21 2012  2:12PM  

 

                    Chest Pain          Dec 13 2012  1:51PM  

 

                    Cough               Mar  4 2013 11:11AM  

 

                    Hypertension        Mar 15 2013 10:45AM  

 

                    Hypertension        Aug  9 2013  9:47AM  

 

                    Hyperglycemia       Aug  9 2013  9:47AM  

 

                    Onychomycosis of Fingernail Oct  7 2013  2:24PM  

 

                    Onychomycosis of Fingernail Oct  8 2013  9:23AM  

 

                    Onychomycosis of Fingernail 2014  9:35AM  

 

                    Atopic Dermatitis   2014  9:35AM  

 

                    Lung Nodule         2014  9:35AM  

 

                    Hypertension        2014  9:35AM  

 

                    Hypertension        2014  2:16PM  

 

                    Onychomycosis of Fingernail 2014  2:16PM  

 

                    Gout                2014  2:16PM  

 

                    Actinic keratosis   2015  2:14PM  

 

                    Onychomycosis       2015  2:14PM  

 

                    Seborrheic keratoses 2015  2:14PM  

 

                    Hypertension        2015  1:45PM  

 

                    Gout                2015  1:45PM  

 

                    Hyperglycemia       2015  1:45PM  

 

                    Hyperlipidemia, unspecified 2016  8:25AM  

 

                    Hyperglycemia       2016  8:25AM  

 

                    Esophageal Reflux   Sep 26 2016 11:00AM  

 

                    Hyperlipidemia, unspecified Mar  1 2017  9:27AM  

 

                    Fatigue             Mar  1 2017  9:27AM  

 

                    BCC (basal cell carcinoma of skin) Mar 24 2017 10:21AM  

 

                    BCC (basal cell carcinoma of skin) Mar 24 2017 10:21AM  

 

                    Keratosis           Mar 24 2017 10:21AM  

 

                    Hypertension        2017 11:14AM  

 

                    Hypertension        2017  8:16AM  

 

                    Gout                2017  8:16AM  

 

                    Fatigue             2017  8:16AM  

 

                    Weight loss         2017  8:16AM  

 

                    Acute cystitis without hematuria Oct 12 2017  9:21AM  

 

                    Hypertension        2018  9:14AM  

 

                    Hyperglycemia       2018  9:14AM  

 

                    Gout                2018  9:14AM  

 

                    Hypertension        May  4 2018  8:41AM  

 

                    TMJ syndrome        May  4 2018  8:41AM  

 

                    Chest pain          2018 11:10AM  

 

                    Dyspnea             2018 11:10AM  

 

                    Risk for falls      Dec 18 2018  2:05PM  

 

                    Gastritis           2019  9:00AM  

 

                    Upper Respiratory Infection Mar 21 2019  8:56AM  

 

                    IBS (irritable bowel syndrome) 2019  9:13AM  

 

                    Irritable bowel syndrome (IBS) 2019  3:27PM  

 

                    Blood tests prior to treatment or procedure 2019  3:2

7PM  

 

                    Renal cyst          May  8 2019 12:28PM  

 

                    Shortness Of Breath May 16 2019  9:52AM  

 

                    Chest pain          May 16 2019  9:52AM  

 

                    Diarrhea            May 16 2019  9:52AM  

 

                    GERD without esophagitis May 16 2019  9:52AM  

 

                                        Bitten or stung by nonvenomous insect an

d other nonvenomous arthropods, initial 

encounter                 May 16 2019  9:52AM        

 

                    Hypertension        May 21 2019  9:06AM  

 

                    Anemia              May 21 2019  9:06AM  

 

                    Gastritis           May 21 2019  9:06AM  

 

                    Change in bowel habit May 28 2019  8:40AM  

 

                    Dyspepsia           May 28 2019  8:40AM  

 

                    Abdominal Pain      [place] 10 2019 12:24PM  

 

                    Dyspepsia           [place] 10 2019 12:24PM  

 

                    Anemia, Iron Deficiency 2019 11:09AM  

 

                    Hypokalemia         2019 11:09AM  

 

                    Dyspepsia           2019  2:17PM  

 

                    Diarrhea            2019  2:17PM  

 

                    Biliary dyskinesia  2019  2:17PM  

 

                    Basal cell carcinoma Aug  7 2019  1:58PM  

 

                    Sebaceous cyst      Aug  7 2019  1:58PM  

 

                    Basal cell carcinoma of skin, site unspecified Aug  7 2019  

1:59PM  

 

                    Dyspnea             Oct 11 2019 10:58AM  

 

                    Chest pain          Oct 11 2019 10:58AM  

 

                    Blood tests prior to treatment or procedure Oct 14 2019 12:1

3PM  

 

                    Coronary Artery Disease 2020  3:09PM  

 

                    Hypertension        2020  3:09PM  

 

                    Hyperlipidemia      2020  3:09PM  

 

                    Pneumonia           2020  3:12PM  

 

                    Abnormal CT scan, chest 2020  3:12PM  

 

                    Anemia              2021 10:51AM  

 

                    Fatigue             2021 10:51AM  

 

                    Hypertension        2021 10:03AM  

 

                    Chest pain          2021 10:03AM  

 

                    CAD (coronary artery disease) 2021 10:03AM  

 

                    Gout                2021 10:03AM  

 

                    Anemia, Iron Deficiency 2021  3:28PM  

 

                    Anemia              Sep  3 2021 11:24AM  

 

                    Fatigue             Sep  3 2021 11:24AM  

 

                    CAD (coronary artery disease) Sep  3 2021 11:24AM  

 

                    Dyspnea             Sep  3 2021 11:24AM  

 

                    Cellulitis          Sep  3 2021 10:48AM  

 

                    Chronic Obstructive Pulmonary Disease Sep  3 2021 10:48AM  

 

                    PAD (peripheral artery disease) Sep  3 2021 10:48AM  

 

                    Anemia              Sep  7 2021  4:10PM  







Payers





           Insurance Name Company Name Plan Name  Plan Number Policy Number Antony

cy Group 

Number                                  Start Date

 

                    Medicare RHC Medicare RHC           [id number]           N/

A

 

                    AARP      AARP                [id number]           N/A

 

                    Railroad Medicare Railroad Medicare           [id number]   

        

 

                    BCBS      Bcbs [facility]           [id number]           N/

A

 

                    Railroad Medicare - Railroad [employer] Medicare           [id number]

[id number]           N/A

 

                    Medicare Part A Medicare Part A           [id number]       

    N/A

 

                    Medicare Part A Medicare - Lab/Xray           [id number]   

        N/A

 

                    Medicare RHC Medicare RHC           [id number]           N/

A

 

                    Medicare Part B Medicare [place]           [id number]    

       N/A







History of Encounters





                    Visit Date          Visit Type          Provider

 

                    9/3/2021            Office visit        Joe Conley MD

 

                    2021           Office visit        Joe Conley MD

 

                    10/11/2019          Office visit        Joe Conley MD

 

                    2019           Procedures          Mike Mantilla DO

 

                    2019            Procedures          Mike Mantilla DO

 

                    2019           Surgery             Mike Mantilla DO

 

                    2019           [facility]            CHERISE Castillo MD

 

                    2019           Surgery             Mike Mantilla DO

 

                    6/10/2019           Surgery             Mike Mantilla DO

 

                    2019           Office visit        Mike Mantilla DO

 

                    2019           Office visit        Joe Conley MD

 

                    2019           Office visit        Ailyn BEARD

 

                    2019           Office visit        Joe Conley MD

 

                    3/21/2019           Office visit        Joe Conley MD

 

                    2019            Office visit        Joe Conley MD

 

                    2019            [facility]            CHERISE Castillo MD

 

                    2018          Office visit        Joe Conley MD

 

                    2018            Office visit        Joe Conley MD

 

                    2018           Office visit        Joe Conley MD

 

                    10/12/2017          Office visit        Ailyn BEARD

 

                    2017            Office visit        Joe Conley MD

 

                    2017           Office visit        Joe Conley MD

 

                    2017            Procedures          Mike Mantilla DO

 

                    3/24/2017           Procedures          Mike Mantilla DO

 

                    3/1/2017            Office visit        Joe Conley MD

 

                    2016           Office visit        Joe Conley MD

 

                    2016           Office visit        Joe Conley MD

 

                    2015            Office visit        Joe Conley MD

 

                    2015           Office visit        ADIS SAGASTUME

 

                    2014           Office visit        ADIS SAGASTUME

 

                    2014           Office visit        Joe Conley MD

 

                    2014           Office visit        Joe Conley MD

 

                    10/7/2013           Office visit        Joe Conley MD

 

                    2013            Office visit        Joe Conley MD

 

                    3/15/2013           Office visit        Joe Conley MD

 

                    2012          Office visit        Joe Conley MD

 

                    2012           Office visit        Joe Conley MD

 

                    3/31/2011           Office visit        Joe Conley MD

 

                    3/1/2011            Office visit        Joe Conley MD

 

                    2010           Laboratory          Ramy Raines MD

 

                    2010           Office visit        Joe Conley MD

 

                    2010           Office visit        Joe Conley MD

 

                    10/5/2009           Office visit        Joe Conley MD

## 2021-10-05 NOTE — CARDIAC CATHETERIZATION
DATE OF SERVICE:  10/05/2021



CARDIAC CATHETERIZATION REPORT



INDICATION FOR PROCEDURE:

The patient is a 81-year-old lady with known coronary artery disease and

peripheral vascular disease.  Lately, she has been having symptoms of chest

discomfort and shortness of breath.  Because of that and her prior history of

coronary artery disease for which she has required coronary interventions, we

proceeded with cardiac catheterization after having obtained an informed

consent.



DESCRIPTION OF PROCEDURE:

She was brought to the cardiac catheterization laboratory in a fasting state. 

Right groin was prepared and draped in the usual sterile fashion.  Lidocaine 1%

was used for local anesthesia.  Modified Seldinger technique was used to advance

a 5-Comoran sheath into the left femoral artery.  Angiography of the left femoral

artery was carried out through the sheath.  The sheath was found to be present

in the deep femoral extending into the common femoral.  The left superficial

femoral appears to be occluded in its proximal portion.  We used a 5-Comoran JL4

catheter for left coronary angiography and a 5-Comoran JR4 catheter for right

coronary angiography.  We used a 5-Comoran pigtail catheter for left heart

catheterization.  Left ventricular angiography was not performed.  This was to

conserve contrast because the patient had an estimated GFR of approximately 40. 

The patient tolerated the procedure well.  At the end of the procedure, manual

pressure was used to achieve hemostasis following sheath removal.



HEMODYNAMICS:

Left ventricular end-diastolic pressure following coronary angiography was 14

mmHg.  There was no significant pressure gradient on pullback across the aortic

valve.



CORONARY ANGIOGRAPHY:

Coronary calcification is seen in all coronary vessels.  Left main coronary

artery is free of significant disease.  Left anterior descending artery has a

patent stent in its proximal portion that extends across the first diagonal

branch.  The first diagonal branch itself has approximately 70% ostial stenosis.

 The rest of the left anterior descending artery does not exhibit significant

disease.  The left circumflex artery has mild generalized plaque.  The first

obtuse marginal branch is occluded at its ostium.  The right coronary artery is

dominant and has diffuse mild to moderate plaque.



CONCLUSIONS:

Coronary artery disease primarily consisting of approximately 70% ostial

stenosis of a diagonal branch of the left anterior descending artery that

originates from a stented segment of the left anterior descending.  The left

anterior descending artery stent is known to be an Alpine Xience 2.75 x 28 mm

that was placed in 04/2021.  The stent is widely patent and free of significant

disease.  The first obtuse marginal branch of the left circumflex artery is

occluded in its proximal portion.  The rest of the coronary vessels have diffuse

mild to moderate plaque.  Left ventricular end-diastolic pressure of 14 mmHg.



DISCUSSION AND RECOMMENDATIONS:

Based on the results of the study, it appears appropriate to continue a

conservative approach.  Risk factor modification has been reviewed.  Outpatient

followup is advised.  If there is evidence of continuing angina, consideration

can be given to percutaneous intervention to the ostium of the first diagonal

branch that exhibits 70% stenosis and originates in a stentedd segment of the

left anterior descending.





Job ID: [id number]

DocumentID: [id number]

Dictated Date:  10/05/2021 09:39:18

Transcription Date: 10/05/2021 13:04:00

Dictated By: EZEKIEL CAMPOS MD, MA, FACP, FACC,

MTDD

## 2021-10-05 NOTE — DISCHARGE INST-CARDIOLOGY
Discharge Inst-Cardiac


Discharge Medications


Continued Medications:  


Acetaminophen (Tylenol) 325 Mg Tablet


650 MG PO Q6H PRN for PAIN-MILD (1-4), TAB





Allopurinol (Allopurinol) 300 Mg Tablet


300 MG PO DAILY, TAB





Ascorbic Acid/Ascorbate Sodium (Vitamin C 500 mg Wafer) 500 Mg Wafer


500 MG PO DAILY, WAFER





Aspirin (Aspirin EC) 81 Mg Tablet.dr


81 MG PO HS, TAB





Biotin (Biotin) 5,000 Mcg Tab.rapdis


5000 MCG PO DAILY, TAB





Carvedilol (Carvedilol) 25 Mg Tablet


25 MG PO BID, TAB





Clopidogrel Bisulfate (Clopidogrel) 75 Mg Tablet


75 MG PO DAILY, TAB





Cyanocobalamin (Vitamin B-12) (Vitamin B-12) 1,000 Mcg Tablet


1000 MCG PO DAILY, TAB





Ferrous Sulfate (Iron) 325 Mg Tablet


325 MG PO DAILY, TAB





Isosorbide Mononitrate (Isosorbide Mononitrate ER) 30 Mg Tab.er.24h


30 MG PO DAILY, TAB





Multivitamin/Iron/Folic Acid (Centrum Adults Tablet) 1 Each Tablet


1 EACH PO DAILY, TAB





Nitroglycerin (Nitroglycerin) 0.4 Mg Tab.subl


0.4 MG SL UD PRN for CHEST PAIN, TAB





Pantoprazole Sodium (Pantoprazole Sodium) 40 Mg Tablet.


40 MG PO DAILY, TAB

















[facility]EL CAMPOS [facility] CCDS    Oct 5, 2021 09:44

## 2021-10-05 NOTE — CARDIAC PROCEDURE NOTE-CS/ASA
Pre-Procedure Note


Pre-Op Procedure Note


H&P Reviewed


The H&P was reviewed, patient examined and no changes noted.


Date H&P Reviewed:  Oct 5, 2021


Time H&P Reviewed:  09:00





Conscious Sedation Pre-Proced


Time


09:00





ASA Score


3


For ASA 3 and 4: Consider anesthesia and medical clearance. Also, for patients

with a history of failed moderate sedation consider anesthesia.

















Airway 


 


Lungs 


 


Heart 


 


 ASA score


 


 ASA 1: a normal healthy patient


 


 ASA 2:  a patient with a mild systemic disease (mid diabetes, controlled 

hypertension, obesity 


 


 ASA 3:  a patient with a severe systemic disease that limits activity  (angina,

COPD, prior Myocardial infarction)


 


 ASA 4:  a patient with an incapacitating disease that is a constant threat to 

life (CHF, renal failure)


 


 ASA 5:  a moribund patient not expected to survive 24 hrs.  (ruptured aneurysm)


 


 ASA 6:  a declared brain-dead patient whose organs are being harvested.


 


 For emergent operations, add the letter E after the classification











Mallampati Classification


Grade 2





Sedation Plan


Analgesia, Amnesia, Plan communicated to team members, Discussed options with 

patient/fam, Discussed risks with patient/fam


The patient is an appropriate candidate to undergo the planned procedure, 

sedation, and anesthesia.





The patient immediately re-assessed prior to indication.











EZEKIEL CAMPOS MD FACP FAC CCDS    Oct 5, 2021 09:42

## 2021-10-05 NOTE — XMS REPORT
MU2 Ambulatory Summary

                             Created on: 2021



Laury Og

External Reference #: [id number]

: 1940

Sex: Female



Demographics





                          Address                   [address]

[place]  [address]

 

                          Home Phone                [phone]

 

                          Preferred Language        English

 

                          Marital Status            

 

                          Anglican Affiliation     Unknown

 

                          Race                      White

 

                          Ethnic Group              Not  or 





Author





                          Author                    Laury Lopez

 

                          Organization              [facility] 

oup

 

                          Address                   [address]

[place], [place]  [address]



 

                          Phone                     [phone]







Care Team Providers





                    Care Team Member Name Role                Phone

 

                    Vijaya Lopez   PCP                 [phone]

 

                    Joe Conley     PreferredProvider   [phone]







Allergies and Adverse Reactions





                    Name                Reaction            Notes

 

                    erythromycin        vomiting             

 

                    STATINS: HMG-COA REDUCTASE INHIBITORS joint pain           

 

                    BANANAS             severe stomache pain  

 

                    Demerol             vomiting             







Plan of Treatment





             Planned Activity Comments     Planned Date Planned Time Plan/Goal

 

             gastritis and anemia, needing endsocpy                             

            

 

             EKG.                      2018   12:00 AM      

 

             CBC W/ AUTO DIFF (RFLX MAN DIFF IF IND).              9/10/2021    

12:00 AM      







Medications





                                        Active 

 

             Name         Start Date   Estimated Completion Date SIG          Co

mments

 

             aspirin 81 mg oral tablet                           take 1 tablet b

y oral route daily  

 

             biotin 5 mg oral capsule                           take 1 capsule b

y oral route daily  

 

                vitamin B12-folic acid 500-400 mcg oral tablet                  

               take 1 tablet by oral route 

daily                                    

 

                ibuprofen 800 mg oral tablet                                 noemi

e 1 tablet (800 mg) by oral route 3 times 

per day with food                        

 

                potassium chloride 20 mEq oral tablet extended release 2019

                       take 1 tablet

(20 meq) by oral route 2 times per day with food  

 

                Lomotil 2.5-0.025 mg oral tablet                                

 take 2 tablets (5 mg) by oral route once 

daily as needed                          

 

                Levaquin 500 mg oral tablet                                 take

 1 tablet (500 mg) by oral route once daily 

for 7 days                               

 

                hydrochlorothiazide 25 mg oral tablet 2019                

      TAKE 1 TABLET BY MOUTH EVERY 

DAY                                      

 

                HYDROCHLOROTHIAZIDE 25MG TABLETS 12/10/2020      2021      

 TAKE 1 TABLET BY MOUTH 

EVERY DAY                                

 

                clopidogrel 75 mg oral tablet                                 ta

ke 1 tablet (75 mg) by oral route once daily

for 30 days                              

 

                Iron (ferrous sulfate) 325 mg (65 mg iron) oral tablet 2021       take 

1 tablet (325 mg) by oral route once daily for 90 days  

 

                isosorbide mononitrate 30 mg oral tablet extended release 24 hr 

                                take 1 

tablet (30 mg) by oral route once daily in the morning for 90 days  

 

                melatonin 3 mg oral tablet 2021       take 

1 tablet by oral route daily

for 90 days                              

 

                nitroglycerin 0.4 mg sublingual tablet, sublingual 2021       give 1 

tablet sublingually every 5min. as needed for chest pain times 3 doses, if not 
relieved go to ER                        

 

                pantoprazole 40 mg oral tablet,delayed release (DR/EC) 2021

                       TAKE 1 TABLET

BY MOUTH EVERY DAY                       

 

                Protonix 40 mg oral tablet,delayed release (DR/EC) 2021       take 1 

tablet (40 mg) by oral route once daily for 90 days  

 

             allopurinol 300 mg oral tablet 2021                 TAKE 1 TAB

LET BY MOUTH ONCE DAILY  

 

                FERROUS SULFATE 325MG (5GR) TABS 2021      

 TAKE 1 TABLET BY MOUTH 

EVERY DAY                                

 

                carvedilol 25 mg oral tablet 9/3/2021        2022        noemi

e 1 tablet (25 mg) by oral 

route every 12 hours with food for 30 days  







                                         

 

             Name         Start Date   Expiration Date SIG          Comments

 

             Biotin Oral                                          

 

                Zithromax Z-Jones 250 mg oral tablet 5/3/2010        2010     

   take 2 tablets (500 mg) by 

oral route once daily for 1 day then 1 tablet (250 mg) by oral route once daily 
for 4 days                               

 

                Coreg 12.5 mg oral tablet 2010        1BID -

 TAKE ONE TABLET BY MOUTH 

TWICE DAILY                              

 

                ALLOPURINOL 300MG    each 2011        2/3/2011       

 1QD - TAKE ONE TABLET BY 

MOUTH EVERY DAY                          

 

                Edarbyclor 40-12.5 mg oral tablet 4/15/2013       2013    

  take 1 tablet by oral 

route once daily                         

 

                carvedilol 12.5 mg oral tablet 2013       T

IRVIN ONE TABLET BY MOUTH TWICE

DAILY                                    

 

                gemfibrozil 600 mg oral tablet 2013       T

IRVIN ONE TABLET BY MOUTH TWICE

DAILY (BEFORE  MORNING  AND  EVENING  MEALS)  

 

                losartan-hydrochlorothiazide 100-25 mg oral tablet 2013    

   10/16/2013      TAKE ONE

TABLET BY MOUTH IN THE MORNING           

 

                Levaquin 500 mg oral tablet 2013      take

 1 tablet (500 mg) by oral 

route once daily for 7 days              

 

                Sporanox 100 mg oral capsule                                 noemi

e 1 capsule (100 mg) by oral route once 

daily with food                          

 

                Sunscreen SPF 30 2015                       apply q2h while

 outdoors, q1h if swimming or 

sweating                                 

 

             Vaseline     2015                 apply to treated area  

 

                urea 40 % topical cream 2015                       apply to

 the affected area(s) by topical 

route once a day                         

 

                econazole 1 % topical cream 2015                       appl

y to the affected and surrounding 

areas of skin by topical route once daily  

 

                Zetia 10 mg oral tablet                                 take 1 t

ablet (10 mg) by oral route once daily for 

30 days                                  

 

                amoxicillin 875 mg oral tablet                                 t

irvin 1 tablet (875 mg) by oral route every 12

hours for 10 days, finish 16        

 

                Nexium 40 mg oral capsule,delayed release(DR/EC) 2016      

 10/24/2016      take 1 

capsule (40 mg) by oral route once daily for 4 weeks  

 

                Keflex 500 mg oral capsule 4/3/2017        2017       take 

1 capsule (500 mg) by oral 

route every 12 hours for 14 days         

 

                Cipro 500 mg oral tablet                                 take 1 

tablet (500 mg) by oral route every 12 hours

for 7 days                               

 

                Hyzaar 100-25 mg oral tablet 2018       noemi

e 1 tablet by oral route once

daily for 30 days                        

 

                Naprosyn 500 mg oral tablet 2018       take

 1 tablet (500 mg) by oral 

route 2 times per day with food for 14 days  

 

                Cipro 250 mg oral tablet                                 take 1 

tablet (250 mg) by oral route every 12 hours

for 5 days                               

 

                Zithromax 500 mg oral tablet 3/21/2019       3/28/2019       noemi

e 1 tablet (500 mg) by oral 

route once daily for 7 days              

 

                Carafate 1 gram oral tablet 2019       take

 1 tablet by oral route 4 

times a day for 14 days                  

 

                Suprep Bowel Prep Kit 17.5-3.13-1.6 gram oral recon soln 20

19                       take as 

directed                                 

 

                Augmentin 875-125 mg oral tablet 10/11/2019      10/18/2019     

 take 1 tablet by oral 

route every 12 hours for 7 days          

 

                gemfibrozil 600 mg oral tablet 2019        T

IRVIN 1 TABLET BY MOUTH TWICE 

DAILY BEFORE MORNING AND EVENING MEALS for 30 days  

 

                amoxicillin 875 mg oral tablet 9/3/2021        9/10/2021       t

irvin 1 tablet (875 mg) by oral

route every 12 hours for 7 days          







                                        Discontinued 

 

             Name         Start Date   Discontinued Date SIG          Comments

 

                Vaseretic 10-25 mg oral tablet                 3/1/2011        t

irvin 1 tablet by oral route once daily

                                         

 

                Omnaris 50 mcg nasal spray,non-aerosol 2010 

       inhale 2 sprays (100 

mcg) in each nostril by intranasal route once daily  

 

                Mobic 15 mg oral tablet 2010       3/1/2011        take 1 t

ablet (15 mg) by oral route 

once daily                               

 

                Cipro 500 mg oral tablet                 2013        take 1 

tablet (500 mg) by oral route every 

12 hours for 7 days                      

 

                amoxicillin 500 mg oral capsule                 2013        

take 1 capsule (500 mg) by oral route

3 times per day x 7days                  

 

                Levaquin 500 mg oral tablet                 2013        take

 1 tablet (500 mg) by oral route once

daily for 7 days                         

 

                hydrocortisone 2.5 % topical cream 2014    

   apply to the affected 

area(s) by topical route once daily      

 

                losartan-hydrochlorothiazide 100-25 mg oral tablet 10/13/2014   

   2015        TAKE ONE 

TABLET BY MOUTH IN THE MORNING           

 

                Hyzaar 100-25 mg oral tablet 2017        noemi

e 1 tablet by oral route once

daily for 30 days                        

 

                Diflucan 150 mg oral tablet                 2018       take

 1 tablet (150 mg) by oral route 

once                                     

 

                Macrobid 100 mg oral capsule 10/12/2017      2018       noemi

e 1 capsule (100 mg) by 

oral route every 12 hours with food for 7 days  

 

                Cipro 500 mg oral tablet 10/17/2017      2018       take 1 

tablet (500 mg) by oral 

route every 12 hours for 7 days          

 

                Iron (ferrous sulfate) 325 mg (65 mg iron) oral tablet          

       2021       take 1 tablet

(325 mg) by oral route 2 times per day   

 

                Dexilant 60 mg oral capsule,biphase delayed releas 2019       take 1 

capsule (60 mg) by oral route once daily  







Problem List





                    Description         Status              Onset

 

                    Hypertension        Active               

 

                    Gout                Active               

 

                    Heart disease       Active               

 

                    Hyperlipidemia      Active               

 

                    Arthritis unspecified Active               

 

                    Actinic Keratosis   Active              2015

 

                    Onychomycosis       Active              2015

 

                    Seborrheic keratoses Active              2015

 

                    BCC (basal cell carcinoma of skin) Active              

 

                    Keratosis           Active              2017

 

                    Dyspepsia           Active              2019

 

                    Change in bowel habit Active              2019







Vital Signs





     Date Time BP-Sys(mm[Hg] BP-Ester(mm[Hg]) HR(bpm) RR(rpm) Temp WT   HT   HC   

BMI  BSA  BMI

 Percentile                             O2 Sat(%)

 

        9/3/2021 10:48:00  mm[Hg] 70 mm[Hg] 74 {beats}/min 20 rpm  98.1 F 

 161.5 lbs

           66 in                 26.0665 kg/m2 1.847 m2              97 %

 

        2021 10:04:00  mm[Hg] 72 mm[Hg] 70 {beats}/min 18 rpm  98.6 F

  150.25 

lbs        66 in                 24.25 kg/m2 1.78 m2               98 %

 

        10/11/2019 10:55:00  mm[Hg] 60 mm[Hg] 65 {beats}/min 18 rpm  97.9 

F  141 lbs

           66 in                 22.7578 kg/m2 1.7258 m2             98 %

 

        2019 1:58:00  mm[Hg] 72 mm[Hg] 65 {beats}/min 20 rpm  97 F    

145.5 lbs 66

 in                       23.48 kg/m2  1.75 m2                    

 

        2019 8:53:00  mm[Hg] 63 mm[Hg] 70 {beats}/min 70 rpm  97.5 F 

 145.5 lbs

           66 in                 23.4841 kg/m2 1.7531 m2              

 

        2019 8:47:00  mm[Hg] 63 mm[Hg] 75 {beats}/min 20 rpm  98.8 F 

 150 lbs 

66 in                     24.21 kg/m2  1.78 m2                    

 

        2019 9:02:00  mm[Hg] 78 mm[Hg] 65 {beats}/min 18 rpm  98.4 F 

 156.375 

lbs        67 in                 24.4915 kg/m2 1.8311 m2             98 %

 

        2019 9:50:00  mm[Hg] 68 mm[Hg] 68 {beats}/min 18 rpm  97.9 F 

 156 lbs 

67 in                     24.43 kg/m2  1.83 m2                   100 %

 

        2019 9:10:00  mm[Hg] 70 mm[Hg] 65 {beats}/min 18 rpm  97.2 F 

 158.5 lbs

           67 in                 24.8244 kg/m2 1.8435 m2             97 %

 

        3/21/2019 8:53:00  mm[Hg] 72 mm[Hg] 80 {beats}/min 20 rpm  97.5 F 

 154.375 

lbs        67 in                 24.18 kg/m2 1.82 m2               96 %

 

        2019 8:54:00  mm[Hg] 72 mm[Hg] 64 {beats}/min 14 rpm  97.9 F  

156 lbs 67

 in                       24.4328 kg/m2 1.8289 m2                 98 %

 

        2018 11:03:00  mm[Hg] 70 mm[Hg] 68 {beats}/min 16 rpm  98.1 

F  163 lbs

           67 in                 25.53 kg/m2 1.87 m2               98 %

 

        2018 8:39:00  mm[Hg] 74 mm[Hg] 65 {beats}/min 18 rpm  97.7 F  

171.25 lbs

           67 in                 26.8213 kg/m2 1.9163 m2             97 %

 

        2018 9:06:00  mm[Hg] 92 mm[Hg] 62 {beats}/min 16 rpm  96.1 F 

 169 lbs 

67 in                     26.47 kg/m2  1.90 m2                   97 %

 

        10/12/2017 9:18:00  mm[Hg] 70 mm[Hg] 61 {beats}/min 14 rpm  97.3 F

  168.25 

lbs        67 in                 26.3514 kg/m2 1.8994 m2             99 %

 

        2017 8:13:00  mm[Hg] 76 mm[Hg] 69 {beats}/min 18 rpm  97.3 F  

172 lbs 67

 in                       26.94 kg/m2  1.92 m2                   98 %

 

        2017 11:06:00  mm[Hg] 98 mm[Hg] 62 {beats}/min 16 rpm  96.9 F

  178 lbs 

67 in                     27.8785 kg/m2 1.9537 m2                 9 %

 

        2017 10:35:00  mm[Hg] 88 mm[Hg] 67 {beats}/min 20 rpm  97.8 F 

 179 lbs 

67 in                     28.04 kg/m2  1.96 m2                    

 

        3/24/2017 9:50:00  mm[Hg] 88 mm[Hg] 67 {beats}/min 20 rpm  97.8 F 

 179 lbs 

67 in                     28.0351 kg/m2 1.9591 m2                  

 

        3/1/2017 9:25:00  mm[Hg] 78 mm[Hg] 62 {beats}/min 17 rpm  97.6 F  

180 lbs 67

 in                       28.19 kg/m2  1.96 m2                   97 %

 

        2016 10:56:00  mm[Hg] 88 mm[Hg] 60 {beats}/min 18 rpm  97.6 F

  179 lbs 

67 in                     28.0351 kg/m2 1.9591 m2                 96 %

 

        2016 8:24:00  mm[Hg] 70 mm[Hg] 72 {beats}/min 18 rpm  97.5 F 

 174 lbs 

67 in                     27.25 kg/m2  1.93 m2                   99 %

 

        2015 1:40:00  mm[Hg] 72 mm[Hg] 57 {beats}/min 18 rpm  98.5 F  

191.25 lbs

           67 in                 29.9537 kg/m2 2.0251 m2             96 %

 

        2014 2:11:00  mm[Hg] 64 mm[Hg] 74 {beats}/min 20 rpm  97.6 F 

 205 lbs 

67 in                     32.11 kg/m2  2.10 m2                    

 

        2014 9:28:00  mm[Hg] 80 mm[Hg] 72 {beats}/min 18 rpm  96.8 F 

 203 lbs 

67 in                     31.794 kg/m2 2.0863 m2                  

 

        10/7/2013 2:22:00  mm[Hg] 75 mm[Hg] 70 {beats}/min 18 rpm  98 F   

 206 lbs 67 

in                        32.26 kg/m2  2.10 m2                   95 %

 

        2013 9:42:00  mm[Hg] 72 mm[Hg] 68 {beats}/min 18 rpm  97.4 F  

205 lbs 67

 in                       32.1072 kg/m2 2.0966 m2                  

 

        3/15/2013 10:41:00  mm[Hg] 70 mm[Hg] 60 {beats}/min 18 rpm  97.2 F

  201 lbs 

66 in                     32.44 kg/m2  2.06 m2                    

 

        2012 1:46:00  mm[Hg] 78 mm[Hg] 60 {beats}/min 18 rpm  97.2 F

  203 lbs 

                                                                  

 

        2012 8:48:00  mm[Hg] 80 mm[Hg] 62 {beats}/min 18 rpm  97 F   

 207 lbs 66 

in                        33.41 kg/m2  2.091 m2                   

 

        3/31/2011 9:58:00  mm[Hg] 82 mm[Hg] 68 {beats}/min 20 rpm  97.2 F 

 203 lbs  

                                                                  

 

      3/1/2011 9:05:00  mm[Hg] 74 mm[Hg] 64 {beats}/min 20 rpm 97 F  203 l

bs             

                                                             

 

      2010 9:03:00  mm[Hg] 72 mm[Hg] 58 {beats}/min 20 rpm       203 

lbs                   

                                                     

 

        2010 8:40:00  mm[Hg] 88 mm[Hg] 68 {beats}/min 18 rpm  96.9 F 

 209 lbs  

                                                                  







Social History





                    Name                Description         Comments

 

                    denies alcohol use                       

 

                    Lives with spouse                        

 

                    Tobacco             Never smoker         







History of Procedures





                    Date Ordered        Description         Order Status

 

                    2016 12:00 AM  COMPLETE CBC W/AUTO DIFF WBC Reviewed

 

                    2016 12:00 AM  COMPREHEN METABOLIC PANEL Reviewed

 

                    2016 12:00 AM  LIPID PANEL         Reviewed

 

                    2016 12:00 AM  GLYCOSYLATED HEMOGLOBIN TEST Reviewed

 

                    2012 12:00 AM  COMPLETE CBC W/AUTO DIFF WBC Reviewed

 

                    2012 12:00 AM  COMPREHEN METABOLIC PANEL Reviewed

 

                    2012 12:00 AM  LIPID PANEL         Reviewed

 

                    2012 12:00 AM  GLYCOSYLATED HEMOGLOBIN TEST Reviewed

 

                    2012 12:00 AM  ASSAY THYROID STIM HORMONE Reviewed

 

                    2012 12:00 AM  ASSAY OF BLOOD/URIC ACID Reviewed

 

                    2012 12:00 AM  URINALYSIS AUTO W/SCOPE Reviewed

 

                    3/1/2017 12:00 AM   COMPLETE CBC W/AUTO DIFF WBC Reviewed

 

                    3/1/2017 12:00 AM   COMPREHEN METABOLIC PANEL Reviewed

 

                    3/1/2017 12:00 AM   LIPID PANEL         Reviewed

 

                    3/1/2017 12:00 AM   ASSAY THYROID STIM HORMONE Reviewed

 

                    3/1/2017 12:00 AM   VITAMIN B-12        Reviewed

 

                    3/24/2017 12:00 AM  CMPLX RPR F/C/C/M/N/AX/G/H/F Reviewed

 

                    2017 12:00 AM   COMPLETE CBC W/AUTO DIFF WBC Returned

 

                    2017 12:00 AM   COMPREHEN METABOLIC PANEL Returned

 

                    2017 12:00 AM   ASSAY THYROID STIM HORMONE Returned

 

                    2017 12:00 AM   VITAMIN B-12        Returned

 

                    2017 12:00 AM   ASSAY OF BLOOD/URIC ACID Returned

 

                    2017 12:00 AM   X-RAY EXAM OF HAND  Returned

 

                    2017 12:00 AM   ASSAY OF PREALBUMIN Returned

 

                    10/12/2017 12:00 AM URINE CULTURE/COLONY COUNT Reviewed

 

                    10/12/2017 9:44 AM  URINALYSIS AUTO W/O SCOPE Reviewed

 

                    2018 12:00 AM  COMPLETE CBC W/AUTO DIFF WBC Returned

 

                    2018 12:00 AM  COMPREHEN METABOLIC PANEL Returned

 

                    2018 12:00 AM  LIPID PANEL         Returned

 

                    2018 12:00 AM  GLYCOSYLATED HEMOGLOBIN TEST Returned

 

                    2018 12:00 AM  ASSAY OF BLOOD/URIC ACID Returned

 

                    2018 12:00 AM  ASSAY OF URINE/URIC ACID Returned

 

                    3/4/2013 12:00 AM   CHEST X-RAY 2VW FRONTAL&LATL Reviewed

 

                    2010 12:00 AM  COMPLETE CBC W/AUTO DIFF WBC Reviewed

 

                    2010 12:00 AM  COMPREHEN METABOLIC PANEL Reviewed

 

                    2010 12:00 AM  LIPID PANEL         Reviewed

 

                    2010 12:00 AM  ASSAY OF BLOOD/URIC ACID Reviewed

 

                    2010 12:00 AM  GLYCOSYLATED HEMOGLOBIN TEST Reviewed

 

                    2018 12:00 AM CHEST X-RAY 2VW FRONTAL&LATL Returned

 

                    2018 12:00 AM COMPLETE CBC W/AUTO DIFF WBC Returned

 

                    2018 12:00 AM COMPREHEN METABOLIC PANEL Returned

 

                    2018 12:00 AM ASSAY OF TROPONIN QUANT Returned

 

                    2013 12:00 AM   COMPLETE CBC W/AUTO DIFF WBC Reviewed

 

                    2013 12:00 AM   COMPREHEN METABOLIC PANEL Reviewed

 

                    2013 12:00 AM   GLYCOSYLATED HEMOGLOBIN TEST Reviewed

 

                    2019 12:00 AM  METABOLIC PANEL TOTAL CA Returned

 

                    2019 12:00 AM  CT ABD & PELV W/CONTRAST Returned

 

                    2019 12:00 AM   US EXAM ABDO BACK WALL COMP Returned

 

                    2019 3:40 PM   URINALYSIS AUTO W/O SCOPE Reviewed

 

                    2019 12:00 AM  COMPLETE CBC W/AUTO DIFF WBC Reviewed

 

                    2019 12:00 AM  COMPREHEN METABOLIC PANEL Reviewed

 

                    2019 12:00 AM  ELECTROCARDIOGRAM TRACING Reviewed

 

                    2019 12:00 AM  ASSAY OF TROPONIN QUANT Reviewed

 

                    2019 12:00 AM  FECES CULTURE AEROBIC BACT Reviewed

 

                    2019 12:00 AM  C DIFF AMPLIFIED PROBE Reviewed

 

                    2019 12:00 AM  Tick Panel          Reviewed

 

                    2019 12:00 AM  METABOLIC PANEL TOTAL CA Returned

 

                    2019 12:00 AM  COMPLETE CBC W/AUTO DIFF WBC Returned

 

                    2019 12:00 AM  VITAMIN B-12        Returned

 

                    2019 12:00 AM  ASSAY OF IRON       Returned

 

                    6/10/2019 12:00 AM  ECHO EXAM OF ABDOMEN Reviewed

 

                    6/10/2019 12:00 AM  HEPATOBILIARY SYSTEM IMAGING Reviewed

 

                    2019 12:00 AM  METABOLIC PANEL TOTAL CA Returned

 

                    2019 12:00 AM  ASSAY OF IRON       Returned

 

                    10/11/2019 12:00 AM CT THORAX W/DYE     Returned

 

                    10/14/2019 12:00 AM METABOLIC PANEL TOTAL CA Returned

 

                    2020 12:00 AM   COMPLETE CBC W/AUTO DIFF WBC Returned

 

                    2020 12:00 AM   COMPREHEN METABOLIC PANEL Returned

 

                    2020 12:00 AM   LIPID PANEL         Returned

 

                    2020 12:00 AM   CT THORAX W/DYE     Returned

 

                    2021 12:00 AM  COMPLETE CBC W/AUTO DIFF WBC Returned

 

                    2021 12:00 AM  COMPREHEN METABOLIC PANEL Returned

 

                    2021 12:00 AM  ASSAY THYROID STIM HORMONE Returned

 

                    2021 12:00 AM  ASSAY OF TROPONIN QUANT Returned

 

                    2021 12:00 AM  ASSAY OF BLOOD/URIC ACID Returned

 

                    2021 12:00 AM  ASSAY OF IRON       Returned

 

                    2021 12:00 AM  IRON BINDING TEST   Returned

 

                    2021 12:00 AM  ASSAY OF TRANSFERRIN Returned

 

                    2021 12:00 AM  VITAMIN B-12        Returned

 

                    2021 12:00 AM  COMPLETE CBC W/AUTO DIFF WBC Returned

 

                    2021 12:00 AM  METABOLIC PANEL TOTAL CA Returned

 

                    9/3/2021 12:00 AM   COMPLETE CBC W/AUTO DIFF WBC Returned

 

                    9/3/2021 12:00 AM   COMPREHEN METABOLIC PANEL Returned

 

                    9/3/2021 12:00 AM   ASSAY OF NATRIURETIC PEPTIDE Returned

 

                    9/3/2021 12:00 AM   ASSAY THYROID STIM HORMONE Returned

 

                    9/3/2021 12:00 AM   ASSAY OF BLOOD/URIC ACID Returned

 

                    9/3/2021 12:00 AM   ASSAY OF MAGNESIUM  Returned

 

                    9/3/2021 12:00 AM   ASSAY OF IRON       Returned

 

                    9/3/2021 12:00 AM   IRON BINDING TEST   Returned

 

                    9/3/2021 12:00 AM   ASSAY OF TRANSFERRIN Returned

 

                    9/3/2021 12:00 AM   VITAMIN B-12        Returned

 

                    2021 12:00 AM   COMPLETE CBC W/AUTO DIFF WBC Returned

 

                    2/15/2011 12:00 AM  ROUTINE VENIPUNCTURE Reviewed

 

                    2/15/2011 12:00 AM  COMPREHEN METABOLIC PANEL Reviewed

 

                    2/15/2011 12:00 AM  LIPID PANEL         Reviewed

 

                    2/15/2011 12:00 AM  ASSAY OF BLOOD/URIC ACID Reviewed

 

                    2010 12:00 AM  TTE W/O DOPPLER COMPLETE Reviewed

 

                    2015 12:00 AM  DESTRUCT PREMALG LESION Reviewed

 

                    2015 12:00 AM   COMPLETE CBC W/AUTO DIFF WBC Reviewed

 

                    2015 12:00 AM   COMPREHEN METABOLIC PANEL Reviewed

 

                    2015 12:00 AM   LIPID PANEL         Reviewed

 

                    2015 12:00 AM   GLYCOSYLATED HEMOGLOBIN TEST Reviewed

 

                    2015 12:00 AM   ASSAY OF BLOOD/URIC ACID Reviewed







Results Summary





                          Date and Description      Results

 

                          2010 9:45 AM         GLYCOHEMOGLOBIN A1C 6.30 %UR

IC ACID 5.3 mg/dLTRIGLYCERIDES 

90.0 mg/dLCHOLESTEROL 209.0 mg/dLHDL 43.0 mg/dLTOT CHOL/HDL 4.9 LDL (CALC) 148.0
 mg/dLWBC 10.0 RBC 4.75 HGB 14.10 g/dLHCT 41.0 %MCV 86.0 fLMCH 29.70 pgMCHC 
34.40 g/dLRDW SD 45 RDW CV 14.50 %MPV 10.40 fLPLT 354 NRBC# 0.00 NRBC% 0.0 %NEUT
 63.60 %%LYMP 21.60 %%MONO 5.90 %%EOS 8.30 %%BASO 0.60 %#NEUT 6.35 #LYMP 2.16 
#MONO 0.59 #EOS 0.83 #BASO 0.06 MANUAL DIFF NOT IND GLUCOSE 112.0 mg/dLSODIUM 
142.0 mmol/LPOTASSIUM 3.90 mmol/LCHLORIDE 103.0 mmol/LCO2 28.0 mmol/LBUN 20.0 
mg/dLCREATININE 0.90 mg/dLSGOT/AST 23.0 IU/LSGPT/ALT 13.0 IU/LALK PHOS 75.0 
IU/LTOTAL PROTEIN 6.90 g/dLALBUMIN 4.30 g/dLTOTAL BILI 0.50 mg/dLCALCIUM 9.0 
mg/dLAGE 70 GFR NonAA 62 GFR AA 75 eGFR >60 mL/min/1.73 m2eGFR AA* >60 

 

                          2/15/2011 9:15 AM         TRIGLYCERIDES 97.0 mg/dLCHOL

ESTEROL 212.0 mg/dLHDL 48.0 

mg/dLTOT CHOL/HDL 4.4 LDL (CALC) 145.0 mg/dLURIC ACID 5.3 mg/dLGLUCOSE 110.0 
mg/dLSODIUM 141.0 mmol/LPOTASSIUM 4.10 mmol/LCHLORIDE 102.0 mmol/LCO2 29.0 
mmol/LBUN 18.0 mg/dLCREATININE 0.90 mg/dLSGOT/AST 31.0 IU/LSGPT/ALT 17.0 IU/LALK
 PHOS 74.0 IU/LTOTAL PROTEIN 7.60 g/dLALBUMIN 4.30 g/dLTOTAL BILI 0.30 
mg/dLCALCIUM 10.20 mg/dLAGE 71 GFR NonAA 62 GFR AA 75 eGFR >60 mL/min/1.73 
m2eGFR AA* >60 

 

                          2012 9:55 AM         WBC 8.7 RBC 4.63 HGB 13.40 g

/dLHCT 40.70 %MCV 88.0 fLMCH 28.90

 pgMCHC 32.90 g/dLRDW SD 47 RDW CV 14.70 %MPV 10.20 fLPLT 388 NRBC# 0.00 NRBC% 
0.0 %NEUT 59.90 %%LYMP 22.40 %%MONO 5.60 %%EOS 11.50 %%BASO 0.60 %#NEUT 5.20 
#LYMP 1.94 #MONO 0.49 #EOS 1.00 #BASO 0.05 MANUAL DIFF NOT IND GLUCOSE 114.0 
mg/dLSODIUM 142.0 mmol/LPOTASSIUM 3.80 mmol/LCHLORIDE 104.0 mmol/LCO2 28.0 
mmol/LBUN 23.0 mg/dLCREATININE 0.90 mg/dLSGOT/AST 20.0 IU/LSGPT/ALT 15.0 IU/LALK
 PHOS 69.0 IU/LTOTAL PROTEIN 7.20 g/dLALBUMIN 4.30 g/dLTOTAL BILI 0.30 
mg/dLCALCIUM 9.90 mg/dLAGE 72 GFR NonAA 62 GFR AA 75 eGFR >60 mL/min/1.73 m2eGFR
 AA* >60 TRIGLYCERIDES 94.0 mg/dLCHOLESTEROL 217.0 mg/dLHDL 43.0 mg/dLTOT 
CHOL/HDL 5.0 LDL (CALC) 155.0 mg/dLURIC ACID 5.6 mg/dLTSH 1.920 
uIU/mLGLYCOHEMOGLOBIN A1C 6.30 %

 

                          2012 2:42 PM         COLOR YELLOW APPEARANCE HAZY

 SPEC GRAV 1.025 pH 5.5 PROTEIN 

NEGATIVE GLUCOSE NEGATIVE KETONE NEGATIVE BILIRUBIN NEGATIVE BLOOD TRACE-INTACT 
NITRITE NEGATIVE LEUK SCREEN MODERATE WBC/HPF  RBC/HPF 0-5 CASTS/LPF 
NEGATIVE CRYSTALS NEGATIVE MUCOUS THRDS NEGATIVE BACTERIA 1+ EPITH CELLS 1+ 
RENAL TRICHOMONAS NEGATIVE YEAST NEGATIVE CULT SET UP? YES 

 

                          2013 10:48 AM         WBC 10.3 RBC 4.50 HGB 13.30 

g/dLHCT 39.10 %MCV 87.0 fLMCH 

29.60 pgMCHC 34.0 g/dLRDW SD 46 RDW CV 14.60 %MPV 9.80 fLPLT 354 NRBC# 0.00 
NRBC% 0.0 %NEUT 63.60 %%LYMP 23.40 %%MONO 4.90 %%EOS 7.70 %%BASO 0.40 %#NEUT 
6.56 #LYMP 2.42 #MONO 0.51 #EOS 0.79 #BASO 0.04 MANUAL DIFF NOT IND HGB A1C 6.20
 %Est Avg Glucose 131.2 mg/dLGLUCOSE 90.0 mg/dLSODIUM 142.0 mmol/LPOTASSIUM 3.80
 mmol/LCHLORIDE 102.0 mmol/LCO2 29.0 mmol/LBUN 19.0 mg/dLCREATININE 0.90 
mg/dLSGOT/AST 24.0 IU/LSGPT/ALT 17.0 IU/LALK PHOS 77.0 IU/LTOTAL PROTEIN 7.30 
g/dLALBUMIN 4.20 g/dLTOTAL BILI 0.40 mg/dLCALCIUM 10.60 mg/dLAGE 73 GFR NonAA 62
 GFR AA 75 eGFR 60 eGFR AA* 60 

 

                          4/10/2015 9:58 AM         WBC 8.5 RBC 4.40 HGB 12.90 g

/dLHCT 39.40 %MCV 90.0 fLMCH 29.30

 pgMCHC 32.70 g/dLRDW SD 47 RDW CV 14.40 %MPV 9.90 fLPLT 393 NRBC# 0.00 NRBC% 
0.0 %NEUT 56.0 %%LYMP 25.60 %%MONO 5.40 %%EOS 12.50 %%BASO 0.50 %#NEUT 4.75 
#LYMP 2.17 #MONO 0.46 #EOS 1.06 #BASO 0.04 MANUAL DIFF NOT IND GLUCOSE 123.0 
mg/dLSODIUM 142.0 mmol/LPOTASSIUM 4.10 mmol/LCHLORIDE 105.0 mmol/LCO2 26.0 
mmol/LBUN 22.0 mg/dLCREATININE 1.10 mg/dLSGOT/AST 20.0 IU/LSGPT/ALT 15.0 IU/LALK
 PHOS 94.0 IU/LTOTAL PROTEIN 7.70 g/dLALBUMIN 4.20 g/dLTOTAL BILI 0.30 
mg/dLCALCIUM 10.50 mg/dLAGE 75 GFR NonAA 49 GFR AA 59 eGFR 49 eGFR AA* 59 
TRIGLYCERIDES 111.0 mg/dLCHOLESTEROL 201.0 mg/dLHDL 35.0 mg/dLTOT CHOL/HDL 5.7 
LDL (CALC) 144.0 mg/dLURIC ACID 4.7 mg/dLHGB A1C 6.30 %Est Avg Glucose 134.1 
mg/dL

 

                          2016 8:47 AM         WBC 7.8 RBC 4.67 HGB 13.50 g

/dLHCT 40.90 %MCV 88.0 fLMCH 28.90

 pgMCHC 33.0 g/dLRDW SD 46 RDW CV 14.50 %MPV 10.30 fLPLT 360 NRBC# 0.00 NRBC% 
0.0 %NEUT 59.50 %%LYMP 25.90 %%MONO 6.10 %%EOS 7.90 %%BASO 0.60 %#NEUT 4.65 
#LYMP 2.03 #MONO 0.48 #EOS 0.62 #BASO 0.05 MANUAL DIFF NOT IND GLUCOSE 115.0 
mg/dLSODIUM 144.0 mmol/LPOTASSIUM 3.90 mmol/LCHLORIDE 99.0 mmol/LCO2 30.0 
mmol/LBUN 30.0 mg/dLCREATININE 1.30 mg/dLSGOT/AST 19.0 IU/LSGPT/ALT 14.0 IU/LALK
 PHOS 96.0 IU/LTOTAL PROTEIN 6.90 g/dLALBUMIN 4.40 g/dLTOTAL BILI 0.40 
mg/dLCALCIUM 10.80 mg/dLAGE 76 GFR NonAA 40 GFR AA 48 eGFR 40 eGFR AA* 48 
TRIGLYCERIDES 127.0 mg/dLCHOLESTEROL 204.0 mg/dLHDL 42.0 mg/dLTOT CHOL/HDL 4.9 
LDL (CALC) 137.0 mg/dLHemoglobin A1c 6.30 %Estim. Avg Glu (eAG) 134 

 

                          3/1/2017 9:48 AM          GLUCOSE 101.0 mg/dLSODIUM 14

3.0 mmol/LPOTASSIUM 4.10 

mmol/LCHLORIDE 108.0 mmol/LCO2 25.0 mmol/LBUN 20.0 mg/dLCREATININE 1.20 
mg/dLSGOT/AST 19.0 IU/LSGPT/ALT 10.0 IU/LALK PHOS 96.0 IU/LTOTAL PROTEIN 7.60 
g/dLALBUMIN 4.0 g/dLTOTAL BILI 0.30 mg/dLCALCIUM 10.0 mg/dLAGE 77 GFR NonAA 44 
GFR AA 53 eGFR 44 eGFR AA* 53 TRIGLYCERIDES 61.0 mg/dLCHOLESTEROL 190.0 mg/dLHDL
 40.0 mg/dLTOT CHOL/HDL 4.8 LDL (CALC) 138.0 mg/dLWBC 10.8 RBC 4.50 HGB 13.10 
g/dLHCT 38.90 %MCV 86.0 fLMCH 29.10 pgMCHC 33.70 g/dLRDW SD 45 RDW CV 14.40 %MPV
 9.70 fLPLT 341 NRBC# 0.00 NRBC% 0.0 %NEUT 66.20 %%LYMP 15.80 %%MONO 4.80 %%EOS 
12.30 %%BASO 0.50 %#NEUT 7.19 #LYMP 1.71 #MONO 0.52 #EOS 1.33 #BASO 0.05 MANUAL 
DIFF NOT IND TSH 1.950 uIU/mLVITAMIN B12 331.0 pg/mL

 

                          10/12/2017 9:44 AM        Color Ur lt. yellow Glucose 

Ur-sCnc neg Bilirub Ur Ql Strip 

neg Ketones Ur Ql Strip neg Sp Gr Ur Qn 1.015 Hgb Ur Ql Strip neg pH Ur-LsCnc 
5.5 Prot Ur Ql Strip trace Urobilinogen Ur-mCnc 0.2eu/dl Nitrite Ur Ql Strip neg
 WBC Est Ur Ql Strip large 

 

                          2018 2:05 PM        Falls in last 6 months? No U

nsteady or worry about falling? 

Yes Fall Risk Assessment At Risk 

 

                          2019 10:44 AM        WBC 8.0 RBC 4.11 HGB 10.70 g

/dLHCT 34.60 %MCV 84.0 fLMCH 26.0

 pgMCHC 30.90 g/dLRDW SD 48 fLRDW CV 15.70 %MPV 9.90 fLPLT 517 NRBC# 0.00 NRBC% 
0.0 %NEUT 75.1 %LYMP 14.4 %MONO 5.9 %EOS 3.9 %BASO 0.4 #NEUT 6.02 #LYMP 1.15 
#MONO 0.47 #EOS 0.31 #BASO 0.03 MANUAL DIFF NOT IND GLUCOSE 101 SODIUM 142 
POTASSIUM 3.9 CHLORIDE 107.0 mmol/LCO2 27 BUN 18.0 mg/dLCREATININE 1.040 
mg/dLSGOT/AST 13 SGPT/ALT 6 ALK PHOS 111 TOTAL PROTEIN 7.5 ALBUMIN 3.5 TOTAL 
BILI 0.2 CALCIUM 10.20 mg/dLAGE 79 GFR NonAA 51 GFR AA 62 eGFR 51 eGFR AA* >60 
mL/min/1.73 [id number] AD 0.06 TOXIGENIC C DIFF NEGATIVE 027 NAP1 STRAIN 
PRESUMPTIVE NEGATIVE Lyme IgG/IgM Ab <0.91 Lyme Disease Ab, Quant,IgM WILL 
FOLLOW RMSF, IgG, EIA WILL FOLLOW [facility] Mtn Spotted Fever,IgM WILL FOLLOW E. 
chaffeensis (HME) IgGTiter WILL FOLLOW E. chaffeensis (HME) IgMTiter WILL FOLLOW
 

 

                          2019 3:40 PM         Color Ur lt. yellow Glucose 

Ur-sCnc neg Bilirub Ur Ql Strip 

neg Ketones Ur Ql Strip neg Sp Gr Ur Qn 1.010 Hgb Ur Ql Strip neg pH Ur-LsCnc 
5.0 Prot Ur Ql Strip 100mg/dl Urobilinogen Ur-mCnc 0.2eu/dl Nitrite Ur Ql Strip 
neg WBC Est Ur Ql Strip neg 







History Of Immunizations





      Name  Date Admin Mfg Name Mfg Code Trade Name Lot#  Route Inj   Vis Given 

Vis Pub 

CVX

 

        Pneumococcal 10/1/2019 Not Entered NE      PREVNAR 13 [id number]  Intramuscu

lar Left Arm 

10/1/2019                 1                  133

 

        Influenza 10/1/2019 Not Entered NE      FLUZONE-HIGH DOSE [id number] Intram

uscular Left 

Arm                 10/1/2019           1            135

 

        Influenza 2020 Not Entered NE      FLUZONE-HIGH DOSE         Not Ent

ered Not Entered 

1                  135







History of Past Illness





                    Name                Date of Onset       Comments

 

                    Hypertension        2010  8:42AM  

 

                    Hyperlipidemia, unspecified 2010  8:42AM  

 

                    Arthritis unspecified                      

 

                    Heart disease                            

 

                    Hypertension                             

 

                    Hyperlipidemia                           

 

                    Gout                                     

 

                    Hypertension        2010  9:09AM  

 

                    Gout                2010  9:09AM  

 

                    Osteoarthritis      2010  9:09AM  

 

                    Hyperglycemia       2010  9:09AM  

 

                    Onychodystrophy                          

 

                    Contact Dermatitis                       

 

                    Actinic Keratosis   2015           

 

                    Onychomycosis       2015           

 

                    Seborrheic keratoses 2015           

 

                    Coronary Artery Disease Feb 15 2011  9:03AM  

 

                    Hypertension        Feb 15 2011  9:03AM  

 

                    Hyperlipidemia      Feb 15 2011  9:03AM  

 

                    Gout                Feb 15 2011  9:03AM  

 

                    Hypertension        Mar  1 2011  9:06AM  

 

                    Hyperlipidemia, unspecified Mar  1 2011  9:06AM  

 

                    Gout                Mar  1 2011  9:06AM  

 

                    Hypertension        Mar 31 2011 10:01AM  

 

                    Headache            Mar 31 2011 10:01AM  

 

                    BCC (basal cell carcinoma of skin) 2017           

 

                    Keratosis           2017           

 

                    Dyspepsia           2019           

 

                    Change in bowel habit 2019           

 

                    Hypertension        2012  8:54AM  

 

                    Hyperlipidemia, unspecified 2012  8:54AM  

 

                    Diabetes Mellitus, Type II 2012  8:54AM  

 

                    Gout                2012  8:54AM  

 

                    Night sweats        2012  8:54AM  

 

                    Dysuria             May 21 2012  2:12PM  

 

                    Chest Pain          Dec 13 2012  1:51PM  

 

                    Cough               Mar  4 2013 11:11AM  

 

                    Hypertension        Mar 15 2013 10:45AM  

 

                    Hypertension        Aug  9 2013  9:47AM  

 

                    Hyperglycemia       Aug  9 2013  9:47AM  

 

                    Onychomycosis of Fingernail Oct  7 2013  2:24PM  

 

                    Onychomycosis of Fingernail Oct  8 2013  9:23AM  

 

                    Onychomycosis of Fingernail 2014  9:35AM  

 

                    Atopic Dermatitis   2014  9:35AM  

 

                    Lung Nodule         2014  9:35AM  

 

                    Hypertension        2014  9:35AM  

 

                    Hypertension        2014  2:16PM  

 

                    Onychomycosis of Fingernail 2014  2:16PM  

 

                    Gout                2014  2:16PM  

 

                    Actinic keratosis   2015  2:14PM  

 

                    Onychomycosis       2015  2:14PM  

 

                    Seborrheic keratoses 2015  2:14PM  

 

                    Hypertension        2015  1:45PM  

 

                    Gout                2015  1:45PM  

 

                    Hyperglycemia       2015  1:45PM  

 

                    Hyperlipidemia, unspecified 2016  8:25AM  

 

                    Hyperglycemia       2016  8:25AM  

 

                    Esophageal Reflux   Sep 26 2016 11:00AM  

 

                    Hyperlipidemia, unspecified Mar  1 2017  9:27AM  

 

                    Fatigue             Mar  1 2017  9:27AM  

 

                    BCC (basal cell carcinoma of skin) Mar 24 2017 10:21AM  

 

                    BCC (basal cell carcinoma of skin) Mar 24 2017 10:21AM  

 

                    Keratosis           Mar 24 2017 10:21AM  

 

                    Hypertension        2017 11:14AM  

 

                    Hypertension        2017  8:16AM  

 

                    Gout                2017  8:16AM  

 

                    Fatigue             2017  8:16AM  

 

                    Weight loss         2017  8:16AM  

 

                    Acute cystitis without hematuria Oct 12 2017  9:21AM  

 

                    Hypertension        2018  9:14AM  

 

                    Hyperglycemia       2018  9:14AM  

 

                    Gout                2018  9:14AM  

 

                    Hypertension        May  4 2018  8:41AM  

 

                    TMJ syndrome        May  4 2018  8:41AM  

 

                    Chest pain          2018 11:10AM  

 

                    Dyspnea             2018 11:10AM  

 

                    Risk for falls      Dec 18 2018  2:05PM  

 

                    Gastritis           2019  9:00AM  

 

                    Upper Respiratory Infection Mar 21 2019  8:56AM  

 

                    IBS (irritable bowel syndrome) 2019  9:13AM  

 

                    Irritable bowel syndrome (IBS) 2019  3:27PM  

 

                    Blood tests prior to treatment or procedure 2019  3:2

7PM  

 

                    Renal cyst          May  8 2019 12:28PM  

 

                    Shortness Of Breath May 16 2019  9:52AM  

 

                    Chest pain          May 16 2019  9:52AM  

 

                    Diarrhea            May 16 2019  9:52AM  

 

                    GERD without esophagitis May 16 2019  9:52AM  

 

                                        Bitten or stung by nonvenomous insect an

d other nonvenomous arthropods, initial 

encounter                 May 16 2019  9:52AM        

 

                    Hypertension        May 21 2019  9:06AM  

 

                    Anemia              May 21 2019  9:06AM  

 

                    Gastritis           May 21 2019  9:06AM  

 

                    Change in bowel habit May 28 2019  8:40AM  

 

                    Dyspepsia           May 28 2019  8:40AM  

 

                    Abdominal Pain      [place] 10 2019 12:24PM  

 

                    Dyspepsia           [place] 10 2019 12:24PM  

 

                    Anemia, Iron Deficiency 2019 11:09AM  

 

                    Hypokalemia         2019 11:09AM  

 

                    Dyspepsia           2019  2:17PM  

 

                    Diarrhea            2019  2:17PM  

 

                    Biliary dyskinesia  2019  2:17PM  

 

                    Basal cell carcinoma Aug  7 2019  1:58PM  

 

                    Sebaceous cyst      Aug  7 2019  1:58PM  

 

                    Basal cell carcinoma of skin, site unspecified Aug  7 2019  

1:59PM  

 

                    Dyspnea             Oct 11 2019 10:58AM  

 

                    Chest pain          Oct 11 2019 10:58AM  

 

                    Blood tests prior to treatment or procedure Oct 14 2019 12:1

3PM  

 

                    Coronary Artery Disease 2020  3:09PM  

 

                    Hypertension        2020  3:09PM  

 

                    Hyperlipidemia      2020  3:09PM  

 

                    Pneumonia           2020  3:12PM  

 

                    Abnormal CT scan, chest 2020  3:12PM  

 

                    Anemia              2021 10:51AM  

 

                    Fatigue             2021 10:51AM  

 

                    Hypertension        2021 10:03AM  

 

                    Chest pain          2021 10:03AM  

 

                    CAD (coronary artery disease) 2021 10:03AM  

 

                    Gout                2021 10:03AM  

 

                    Anemia, Iron Deficiency 2021  3:28PM  

 

                    Anemia              Sep  3 2021 11:24AM  

 

                    Fatigue             Sep  3 2021 11:24AM  

 

                    CAD (coronary artery disease) Sep  3 2021 11:24AM  

 

                    Dyspnea             Sep  3 2021 11:24AM  

 

                    Cellulitis          Sep  3 2021 10:48AM  

 

                    Chronic Obstructive Pulmonary Disease Sep  3 2021 10:48AM  

 

                    PAD (peripheral artery disease) Sep  3 2021 10:48AM  

 

                    Anemia              Sep  7 2021  4:10PM  

 

                    Anemia              Sep 10 2021 11:12AM  







Payers





           Insurance Name Company Name Plan Name  Plan Number Policy Number Antony

cy Group 

Number                                  Start Date

 

                    Medicare RHC Medicare RHC           [id number]           N/

A

 

                    AARP      AARP                [id number]           N/A

 

                    Railroad Medicare Railroad Medicare           [id number]   

        

 

                    [employer]BS      [employer] [place]           [id number]           N/

A

 

                    Railroad Medicare - Railroad Railroad Medicare           [id number]

[id number]           N/A

 

                    Medicare Part A Medicare Part A           [id number]       

    N/A

 

                    Medicare Part A Medicare - Lab/Xray           [id number]   

        N/A

 

                    Medicare RHC Medicare RHC           [id number]           N/

A

 

                    Medicare Part B Medicare [place]           [id number]    

       N/A







History of Encounters





                    Visit Date          Visit Type          Provider

 

                    2021            Laboratory          Vijaya SHARP RN

 

                    9/3/2021            Office visit        Joe Conley MD

 

                    2021           Office visit        Joe Conley MD

 

                    10/11/2019          Office visit        Joe Conley MD

 

                    2019           Procedures          Mike Mantilla DO

 

                    2019            Procedures          Mike Mantilla DO

 

                    2019           Surgery             Mike Mantilla DO

 

                    2019           [facility]            CHERISE Castillo MD

 

                    2019           Surgery             Mike Mantilla DO

 

                    6/10/2019           Surgery             Mike Mantilla DO

 

                    2019           Office visit        Mike Mantilla DO

 

                    2019           Office visit        Joe Conley MD

 

                    2019           Office visit        Ailyn BEARD

 

                    2019           Office visit        Joe Conley MD

 

                    3/21/2019           Office visit        Joe Conley MD

 

                    2019            Office visit        Joe Conley MD

 

                    2019            [facility]            CHERISE Castillo MD

 

                    2018          Office visit        Joe Conley MD

 

                    2018            Office visit        Joe Conley MD

 

                    2018           Office visit        Joe Conley MD

 

                    10/12/2017          Office visit        Ailyn BEARD

 

                    2017            Office visit        Joe Conley MD

 

                    2017           Office visit        Joe Conley MD

 

                    2017            Procedures          Mike Mantilla DO

 

                    3/24/2017           Procedures          Mike Mantilla DO

 

                    3/1/2017            Office visit        Joe Conley MD

 

                    2016           Office visit        Joe Conley MD

 

                    2016           Office visit        Joe Conley MD

 

                    2015            Office visit        Joe Conley MD

 

                    2015           Office visit        ADIS SAGASTUME

 

                    2014           Office visit        ADIS SAGASTUME

 

                    2014           Office visit        Joe Conley MD

 

                    2014           Office visit        Joe Conley MD

 

                    10/7/2013           Office visit        Joe Conley MD

 

                    2013            Office visit        Joe Conley MD

 

                    3/15/2013           Office visit        Joe Conley MD

 

                    2012          Office visit        Joe Conley MD

 

                    2012           Office visit        Joe Conley MD

 

                    3/31/2011           Office visit        Joe Conley MD

 

                    3/1/2011            Office visit        Joe Conley MD

 

                    2010           Laboratory          Ramy Raines MD

 

                    2010           Office visit        Joe Conley MD

 

                    2010           Office visit        Joe Conley MD

 

                    10/5/2009           Office visit        Joe Conley MD

## 2021-10-05 NOTE — XMS REPORT
MU2 Ambulatory Summary

                             Created on: 2021



Laury Og

External Reference #: [id number]

: 1940

Sex: Female



Demographics





                          Address                   [address]

[place]  [address]

 

                          Home Phone                [phone]

 

                          Preferred Language        English

 

                          Marital Status            

 

                          Amish Affiliation     Unknown

 

                          Race                      White

 

                          Ethnic Group              Not  or 





Author





                          Author                    Laury Conley

 

                          Organization              [facility] 

[facility]

 

                          Address                   [address]

[place], [place]  [id number]



 

                          Phone                     [phone]







Care Team Providers





                    Care Team Member Name Role                Phone

 

                    Joe Conley     PCP                 [phone]

 

                    Joe Conley     PreferredProvider   [phone]







Allergies and Adverse Reactions





                    Name                Reaction            Notes

 

                    erythromycin        vomiting             

 

                    STATINS: HMG-COA REDUCTASE INHIBITORS joint pain           

 

                    BANANAS             severe stomache pain  

 

                    Demerol             vomiting             







Plan of Treatment





             Planned Activity Comments     Planned Date Planned Time Plan/Goal

 

             gastritis and anemia, needing endsocpy                             

            

 

             EKG.                      2018   12:00 AM      

 

             CBC with Auto              9/3/2021     12:00 AM      

 

             Comprehensive metabolic panel              9/3/2021     12:00 AM   

   

 

             BNP                       9/3/2021     12:00 AM      

 

             Thyroid stimulating hormone (TSH)              9/3/2021     12:00 A

M      

 

             URIC ACID.                9/3/2021     12:00 AM      

 

             Magnesium level              9/3/2021     12:00 AM      

 

             IRON PANEL                9/3/2021     12:00 AM      

 

             IRON PANEL                9/3/2021     12:00 AM      

 

             IRON PANEL                9/3/2021     12:00 AM      

 

             VITAMIN B12               9/3/2021     12:00 AM      







Medications





                                        Active 

 

             Name         Start Date   Estimated Completion Date SIG          Co

mments

 

             aspirin 81 mg oral tablet                           take 1 tablet b

y oral route daily  

 

             biotin 5 mg oral capsule                           take 1 capsule b

y oral route daily  

 

                vitamin B12-folic acid 500-400 mcg oral tablet                  

               take 1 tablet by oral route 

daily                                    

 

                ibuprofen 800 mg oral tablet                                 noemi

e 1 tablet (800 mg) by oral route 3 times 

per day with food                        

 

                potassium chloride 20 mEq oral tablet extended release 2019

                       take 1 tablet

(20 meq) by oral route 2 times per day with food  

 

                Lomotil 2.5-0.025 mg oral tablet                                

 take 2 tablets (5 mg) by oral route once 

daily as needed                          

 

                Levaquin 500 mg oral tablet                                 take

 1 tablet (500 mg) by oral route once daily 

for 7 days                               

 

                hydrochlorothiazide 25 mg oral tablet 2019                

      TAKE 1 TABLET BY MOUTH EVERY 

DAY                                      

 

                HYDROCHLOROTHIAZIDE 25MG TABLETS 12/10/2020      2021      

 TAKE 1 TABLET BY MOUTH 

EVERY DAY                                

 

                clopidogrel 75 mg oral tablet                                 ta

ke 1 tablet (75 mg) by oral route once daily

for 30 days                              

 

                Iron (ferrous sulfate) 325 mg (65 mg iron) oral tablet 2021       take 

1 tablet (325 mg) by oral route once daily for 90 days  

 

                isosorbide mononitrate 30 mg oral tablet extended release 24 hr 

                                take 1 

tablet (30 mg) by oral route once daily in the morning for 90 days  

 

                melatonin 3 mg oral tablet 2021       take 

1 tablet by oral route daily

for 90 days                              

 

                nitroglycerin 0.4 mg sublingual tablet, sublingual 2021       give 1 

tablet sublingually every 5min. as needed for chest pain times 3 doses, if not 
relieved go to ER                        

 

                pantoprazole 40 mg oral tablet,delayed release (DR/EC) 2021

                       TAKE 1 TABLET

BY MOUTH EVERY DAY                       

 

                Protonix 40 mg oral tablet,delayed release (DR/EC) 2021       take 1 

tablet (40 mg) by oral route once daily for 90 days  

 

             allopurinol 300 mg oral tablet 2021                 TAKE 1 TAB

LET BY MOUTH ONCE DAILY  

 

                FERROUS SULFATE 325MG (5GR) TABS 2021      

 TAKE 1 TABLET BY MOUTH 

EVERY DAY                                

 

                carvedilol 25 mg oral tablet 9/3/2021        2022        noemi

e 1 tablet (25 mg) by oral 

route every 12 hours with food for 30 days  

 

                amoxicillin 875 mg oral tablet 9/3/2021        9/10/2021       t

irvin 1 tablet (875 mg) by oral

route every 12 hours for 7 days          







                                         

 

             Name         Start Date   Expiration Date SIG          Comments

 

             Biotin Oral                                          

 

                Zithromax Z-Jones 250 mg oral tablet 5/3/2010        2010     

   take 2 tablets (500 mg) by 

oral route once daily for 1 day then 1 tablet (250 mg) by oral route once daily 
for 4 days                               

 

                Coreg 12.5 mg oral tablet 2010        1BID -

 TAKE ONE TABLET BY MOUTH 

TWICE DAILY                              

 

                ALLOPURINOL 300MG    each 2011        2/3/2011       

 1QD - TAKE ONE TABLET BY 

MOUTH EVERY DAY                          

 

                Edarbyclor 40-12.5 mg oral tablet 4/15/2013       2013    

  take 1 tablet by oral 

route once daily                         

 

                carvedilol 12.5 mg oral tablet 2013       T

IRVIN ONE TABLET BY MOUTH TWICE

DAILY                                    

 

                gemfibrozil 600 mg oral tablet 2013       T

IRVIN ONE TABLET BY MOUTH TWICE

DAILY (BEFORE  MORNING  AND  EVENING  MEALS)  

 

                losartan-hydrochlorothiazide 100-25 mg oral tablet 2013    

   10/16/2013      TAKE ONE

TABLET BY MOUTH IN THE MORNING           

 

                Levaquin 500 mg oral tablet 2013      take

 1 tablet (500 mg) by oral 

route once daily for 7 days              

 

                Sporanox 100 mg oral capsule                                 noemi

e 1 capsule (100 mg) by oral route once 

daily with food                          

 

                Sunscreen SPF 30 2015                       apply q2h while

 outdoors, q1h if swimming or 

sweating                                 

 

             Vaseline     2015                 apply to treated area  

 

                urea 40 % topical cream 2015                       apply to

 the affected area(s) by topical 

route once a day                         

 

                econazole 1 % topical cream 2015                       appl

y to the affected and surrounding 

areas of skin by topical route once daily  

 

                Zetia 10 mg oral tablet                                 take 1 t

ablet (10 mg) by oral route once daily for 

30 days                                  

 

                amoxicillin 875 mg oral tablet                                 t

irvin 1 tablet (875 mg) by oral route every 12

hours for 10 days, finish 16        

 

                Nexium 40 mg oral capsule,delayed release(DR/EC) 2016      

 10/24/2016      take 1 

capsule (40 mg) by oral route once daily for 4 weeks  

 

                Keflex 500 mg oral capsule 4/3/2017        2017       take 

1 capsule (500 mg) by oral 

route every 12 hours for 14 days         

 

                Cipro 500 mg oral tablet                                 take 1 

tablet (500 mg) by oral route every 12 hours

for 7 days                               

 

                Hyzaar 100-25 mg oral tablet 2018       noemi

e 1 tablet by oral route once

daily for 30 days                        

 

                Naprosyn 500 mg oral tablet 2018       take

 1 tablet (500 mg) by oral 

route 2 times per day with food for 14 days  

 

                Cipro 250 mg oral tablet                                 take 1 

tablet (250 mg) by oral route every 12 hours

for 5 days                               

 

                Zithromax 500 mg oral tablet 3/21/2019       3/28/2019       noemi

e 1 tablet (500 mg) by oral 

route once daily for 7 days              

 

                Carafate 1 gram oral tablet 2019       take

 1 tablet by oral route 4 

times a day for 14 days                  

 

                Suprep Bowel Prep Kit 17.5-3.13-1.6 gram oral recon soln 20

19                       take as 

directed                                 

 

                Augmentin 875-125 mg oral tablet 10/11/2019      10/18/2019     

 take 1 tablet by oral 

route every 12 hours for 7 days          

 

                gemfibrozil 600 mg oral tablet 2019        T

IRVIN 1 TABLET BY MOUTH TWICE 

DAILY BEFORE MORNING AND EVENING MEALS for 30 days  







                                        Discontinued 

 

             Name         Start Date   Discontinued Date SIG          Comments

 

                Vaseretic 10-25 mg oral tablet                 3/1/2011        t

irvin 1 tablet by oral route once daily

                                         

 

                Omnaris 50 mcg nasal spray,non-aerosol 2010 

       inhale 2 sprays (100 

mcg) in each nostril by intranasal route once daily  

 

                Mobic 15 mg oral tablet 2010       3/1/2011        take 1 t

ablet (15 mg) by oral route 

once daily                               

 

                Cipro 500 mg oral tablet                 2013        take 1 

tablet (500 mg) by oral route every 

12 hours for 7 days                      

 

                amoxicillin 500 mg oral capsule                 2013        

take 1 capsule (500 mg) by oral route

3 times per day x 7days                  

 

                Levaquin 500 mg oral tablet                 2013        take

 1 tablet (500 mg) by oral route once

daily for 7 days                         

 

                hydrocortisone 2.5 % topical cream 2014    

   apply to the affected 

area(s) by topical route once daily      

 

                losartan-hydrochlorothiazide 100-25 mg oral tablet 10/13/2014   

   2015        TAKE ONE 

TABLET BY MOUTH IN THE MORNING           

 

                Hyzaar 100-25 mg oral tablet 2017        noemi

e 1 tablet by oral route once

daily for 30 days                        

 

                Diflucan 150 mg oral tablet                 2018       take

 1 tablet (150 mg) by oral route 

once                                     

 

                Macrobid 100 mg oral capsule 10/12/2017      2018       noemi

e 1 capsule (100 mg) by 

oral route every 12 hours with food for 7 days  

 

                Cipro 500 mg oral tablet 10/17/2017      2018       take 1 

tablet (500 mg) by oral 

route every 12 hours for 7 days          

 

                Iron (ferrous sulfate) 325 mg (65 mg iron) oral tablet          

       2021       take 1 tablet

(325 mg) by oral route 2 times per day   

 

                Dexilant 60 mg oral capsule,biphase delayed releas 2019       take 1 

capsule (60 mg) by oral route once daily  







Problem List





                    Description         Status              Onset

 

                    Hypertension        Active               

 

                    Gout                Active               

 

                    Heart disease       Active               

 

                    Hyperlipidemia      Active               

 

                    Arthritis unspecified Active               

 

                    Actinic Keratosis   Active              2015

 

                    Onychomycosis       Active              2015

 

                    Seborrheic keratoses Active              2015

 

                    BCC (basal cell carcinoma of skin) Active              

 

                    Keratosis           Active              2017

 

                    Dyspepsia           Active              2019

 

                    Change in bowel habit Active              2019







Vital Signs





     Date Time BP-Sys(mm[Hg] BP-Ester(mm[Hg]) HR(bpm) RR(rpm) Temp WT   HT   HC   

BMI  BSA  BMI

 Percentile                             O2 Sat(%)

 

        9/3/2021 10:48:00  mm[Hg] 70 mm[Hg] 74 {beats}/min 20 rpm  98.1 F 

 161.5 lbs

           66 in                 26.0665 kg/m2 1.847 m2              97 %

 

        2021 10:04:00  mm[Hg] 72 mm[Hg] 70 {beats}/min 18 rpm  98.6 F

  150.25 

lbs        66 in                 24.25 kg/m2 1.78 m2               98 %

 

        10/11/2019 10:55:00  mm[Hg] 60 mm[Hg] 65 {beats}/min 18 rpm  97.9 

F  141 lbs

           66 in                 22.7578 kg/m2 1.7258 m2             98 %

 

        2019 1:58:00  mm[Hg] 72 mm[Hg] 65 {beats}/min 20 rpm  97 F    

145.5 lbs 66

 in                       23.48 kg/m2  1.75 m2                    

 

        2019 8:53:00  mm[Hg] 63 mm[Hg] 70 {beats}/min 70 rpm  97.5 F 

 145.5 lbs

           66 in                 23.4841 kg/m2 1.7531 m2              

 

        2019 8:47:00  mm[Hg] 63 mm[Hg] 75 {beats}/min 20 rpm  98.8 F 

 150 lbs 

66 in                     24.21 kg/m2  1.78 m2                    

 

        2019 9:02:00  mm[Hg] 78 mm[Hg] 65 {beats}/min 18 rpm  98.4 F 

 156.375 

lbs        67 in                 24.4915 kg/m2 1.8311 m2             98 %

 

        2019 9:50:00  mm[Hg] 68 mm[Hg] 68 {beats}/min 18 rpm  97.9 F 

 156 lbs 

67 in                     24.43 kg/m2  1.83 m2                   100 %

 

        2019 9:10:00  mm[Hg] 70 mm[Hg] 65 {beats}/min 18 rpm  97.2 F 

 158.5 lbs

           67 in                 24.8244 kg/m2 1.8435 m2             97 %

 

        3/21/2019 8:53:00  mm[Hg] 72 mm[Hg] 80 {beats}/min 20 rpm  97.5 F 

 154.375 

lbs        67 in                 24.18 kg/m2 1.82 m2               96 %

 

        2019 8:54:00  mm[Hg] 72 mm[Hg] 64 {beats}/min 14 rpm  97.9 F  

156 lbs 67

 in                       24.4328 kg/m2 1.8289 m2                 98 %

 

        2018 11:03:00  mm[Hg] 70 mm[Hg] 68 {beats}/min 16 rpm  98.1 

F  163 lbs

           67 in                 25.53 kg/m2 1.87 m2               98 %

 

        2018 8:39:00  mm[Hg] 74 mm[Hg] 65 {beats}/min 18 rpm  97.7 F  

171.25 lbs

           67 in                 26.8213 kg/m2 1.9163 m2             97 %

 

        2018 9:06:00  mm[Hg] 92 mm[Hg] 62 {beats}/min 16 rpm  96.1 F 

 169 lbs 

67 in                     26.47 kg/m2  1.90 m2                   97 %

 

        10/12/2017 9:18:00  mm[Hg] 70 mm[Hg] 61 {beats}/min 14 rpm  97.3 F

  168.25 

lbs        67 in                 26.3514 kg/m2 1.8994 m2             99 %

 

        2017 8:13:00  mm[Hg] 76 mm[Hg] 69 {beats}/min 18 rpm  97.3 F  

172 lbs 67

 in                       26.94 kg/m2  1.92 m2                   98 %

 

        2017 11:06:00  mm[Hg] 98 mm[Hg] 62 {beats}/min 16 rpm  96.9 F

  178 lbs 

67 in                     27.8785 kg/m2 1.9537 m2                 9 %

 

        2017 10:35:00  mm[Hg] 88 mm[Hg] 67 {beats}/min 20 rpm  97.8 F 

 179 lbs 

67 in                     28.04 kg/m2  1.96 m2                    

 

        3/24/2017 9:50:00  mm[Hg] 88 mm[Hg] 67 {beats}/min 20 rpm  97.8 F 

 179 lbs 

67 in                     28.0351 kg/m2 1.9591 m2                  

 

        3/1/2017 9:25:00  mm[Hg] 78 mm[Hg] 62 {beats}/min 17 rpm  97.6 F  

180 lbs 67

 in                       28.19 kg/m2  1.96 m2                   97 %

 

        2016 10:56:00  mm[Hg] 88 mm[Hg] 60 {beats}/min 18 rpm  97.6 F

  179 lbs 

67 in                     28.0351 kg/m2 1.9591 m2                 96 %

 

        2016 8:24:00  mm[Hg] 70 mm[Hg] 72 {beats}/min 18 rpm  97.5 F 

 174 lbs 

67 in                     27.25 kg/m2  1.93 m2                   99 %

 

        2015 1:40:00  mm[Hg] 72 mm[Hg] 57 {beats}/min 18 rpm  98.5 F  

191.25 lbs

           67 in                 29.9537 kg/m2 2.0251 m2             96 %

 

        2014 2:11:00  mm[Hg] 64 mm[Hg] 74 {beats}/min 20 rpm  97.6 F 

 205 lbs 

67 in                     32.11 kg/m2  2.10 m2                    

 

        2014 9:28:00  mm[Hg] 80 mm[Hg] 72 {beats}/min 18 rpm  96.8 F 

 203 lbs 

67 in                     31.794 kg/m2 2.0863 m2                  

 

        10/7/2013 2:22:00  mm[Hg] 75 mm[Hg] 70 {beats}/min 18 rpm  98 F   

 206 lbs 67 

in                        32.26 kg/m2  2.10 m2                   95 %

 

        2013 9:42:00  mm[Hg] 72 mm[Hg] 68 {beats}/min 18 rpm  97.4 F  

205 lbs 67

 in                       32.1072 kg/m2 2.0966 m2                  

 

        3/15/2013 10:41:00  mm[Hg] 70 mm[Hg] 60 {beats}/min 18 rpm  97.2 F

  201 lbs 

66 in                     32.44 kg/m2  2.06 m2                    

 

        2012 1:46:00  mm[Hg] 78 mm[Hg] 60 {beats}/min 18 rpm  97.2 F

  203 lbs 

                                                                  

 

        2012 8:48:00  mm[Hg] 80 mm[Hg] 62 {beats}/min 18 rpm  97 F   

 207 lbs 66 

in                        33.41 kg/m2  2.091 m2                   

 

        3/31/2011 9:58:00  mm[Hg] 82 mm[Hg] 68 {beats}/min 20 rpm  97.2 F 

 203 lbs  

                                                                  

 

      3/1/2011 9:05:00  mm[Hg] 74 mm[Hg] 64 {beats}/min 20 rpm 97 F  203 l

bs             

                                                             

 

      2010 9:03:00  mm[Hg] 72 mm[Hg] 58 {beats}/min 20 rpm       203 

lbs                   

                                                     

 

        2010 8:40:00  mm[Hg] 88 mm[Hg] 68 {beats}/min 18 rpm  96.9 F 

 209 lbs  

                                                                  







Social History





                    Name                Description         Comments

 

                    denies alcohol use                       

 

                    Lives with spouse                        

 

                    Tobacco             Never smoker         







History of Procedures





                    Date Ordered        Description         Order Status

 

                    2016 12:00 AM  COMPLETE CBC W/AUTO DIFF WBC Reviewed

 

                    2016 12:00 AM  COMPREHEN METABOLIC PANEL Reviewed

 

                    2016 12:00 AM  LIPID PANEL         Reviewed

 

                    2016 12:00 AM  GLYCOSYLATED HEMOGLOBIN TEST Reviewed

 

                    2012 12:00 AM  COMPLETE CBC W/AUTO DIFF WBC Reviewed

 

                    2012 12:00 AM  COMPREHEN METABOLIC PANEL Reviewed

 

                    2012 12:00 AM  LIPID PANEL         Reviewed

 

                    2012 12:00 AM  GLYCOSYLATED HEMOGLOBIN TEST Reviewed

 

                    2012 12:00 AM  ASSAY THYROID STIM HORMONE Reviewed

 

                    2012 12:00 AM  ASSAY OF BLOOD/URIC ACID Reviewed

 

                    2012 12:00 AM  URINALYSIS AUTO W/SCOPE Reviewed

 

                    3/1/2017 12:00 AM   COMPLETE CBC W/AUTO DIFF WBC Reviewed

 

                    3/1/2017 12:00 AM   COMPREHEN METABOLIC PANEL Reviewed

 

                    3/1/2017 12:00 AM   LIPID PANEL         Reviewed

 

                    3/1/2017 12:00 AM   ASSAY THYROID STIM HORMONE Reviewed

 

                    3/1/2017 12:00 AM   VITAMIN B-12        Reviewed

 

                    3/24/2017 12:00 AM  CMPLX RPR F/C/C/M/N/AX/G/H/F Reviewed

 

                    2017 12:00 AM   COMPLETE CBC W/AUTO DIFF WBC Returned

 

                    2017 12:00 AM   COMPREHEN METABOLIC PANEL Returned

 

                    2017 12:00 AM   ASSAY THYROID STIM HORMONE Returned

 

                    2017 12:00 AM   VITAMIN B-12        Returned

 

                    2017 12:00 AM   ASSAY OF BLOOD/URIC ACID Returned

 

                    2017 12:00 AM   X-RAY EXAM OF HAND  Returned

 

                    2017 12:00 AM   ASSAY OF PREALBUMIN Returned

 

                    10/12/2017 12:00 AM URINE CULTURE/COLONY COUNT Reviewed

 

                    10/12/2017 9:44 AM  URINALYSIS AUTO W/O SCOPE Reviewed

 

                    2018 12:00 AM  COMPLETE CBC W/AUTO DIFF WBC Returned

 

                    2018 12:00 AM  COMPREHEN METABOLIC PANEL Returned

 

                    2018 12:00 AM  LIPID PANEL         Returned

 

                    2018 12:00 AM  GLYCOSYLATED HEMOGLOBIN TEST Returned

 

                    2018 12:00 AM  ASSAY OF BLOOD/URIC ACID Returned

 

                    2018 12:00 AM  ASSAY OF URINE/URIC ACID Returned

 

                    3/4/2013 12:00 AM   CHEST X-RAY 2VW FRONTAL&LATL Reviewed

 

                    2010 12:00 AM  COMPLETE CBC W/AUTO DIFF WBC Reviewed

 

                    2010 12:00 AM  COMPREHEN METABOLIC PANEL Reviewed

 

                    2010 12:00 AM  LIPID PANEL         Reviewed

 

                    2010 12:00 AM  ASSAY OF BLOOD/URIC ACID Reviewed

 

                    2010 12:00 AM  GLYCOSYLATED HEMOGLOBIN TEST Reviewed

 

                    2018 12:00 AM CHEST X-RAY 2VW FRONTAL&LATL Returned

 

                    2018 12:00 AM COMPLETE CBC W/AUTO DIFF WBC Returned

 

                    2018 12:00 AM COMPREHEN METABOLIC PANEL Returned

 

                    2018 12:00 AM ASSAY OF TROPONIN QUANT Returned

 

                    2013 12:00 AM   COMPLETE CBC W/AUTO DIFF WBC Reviewed

 

                    2013 12:00 AM   COMPREHEN METABOLIC PANEL Reviewed

 

                    2013 12:00 AM   GLYCOSYLATED HEMOGLOBIN TEST Reviewed

 

                    2019 12:00 AM  METABOLIC PANEL TOTAL CA Returned

 

                    2019 12:00 AM  CT ABD & PELV W/CONTRAST Returned

 

                    2019 12:00 AM   US EXAM ABDO BACK WALL COMP Returned

 

                    2019 3:40 PM   URINALYSIS AUTO W/O SCOPE Reviewed

 

                    2019 12:00 AM  COMPLETE CBC W/AUTO DIFF WBC Reviewed

 

                    2019 12:00 AM  COMPREHEN METABOLIC PANEL Reviewed

 

                    2019 12:00 AM  ELECTROCARDIOGRAM TRACING Reviewed

 

                    2019 12:00 AM  ASSAY OF TROPONIN QUANT Reviewed

 

                    2019 12:00 AM  FECES CULTURE AEROBIC BACT Reviewed

 

                    2019 12:00 AM  C DIFF AMPLIFIED PROBE Reviewed

 

                    2019 12:00 AM  Tick Panel          Reviewed

 

                    2019 12:00 AM  METABOLIC PANEL TOTAL CA Returned

 

                    2019 12:00 AM  COMPLETE CBC W/AUTO DIFF WBC Returned

 

                    2019 12:00 AM  VITAMIN B-12        Returned

 

                    2019 12:00 AM  ASSAY OF IRON       Returned

 

                    6/10/2019 12:00 AM  ECHO EXAM OF ABDOMEN Reviewed

 

                    6/10/2019 12:00 AM  HEPATOBILIARY SYSTEM IMAGING Reviewed

 

                    2019 12:00 AM  METABOLIC PANEL TOTAL CA Returned

 

                    2019 12:00 AM  ASSAY OF IRON       Returned

 

                    10/11/2019 12:00 AM CT THORAX W/DYE     Returned

 

                    10/14/2019 12:00 AM METABOLIC PANEL TOTAL CA Returned

 

                    2020 12:00 AM   COMPLETE CBC W/AUTO DIFF WBC Returned

 

                    2020 12:00 AM   COMPREHEN METABOLIC PANEL Returned

 

                    2020 12:00 AM   LIPID PANEL         Returned

 

                    2020 12:00 AM   CT THORAX W/DYE     Returned

 

                    2021 12:00 AM  COMPLETE CBC W/AUTO DIFF WBC Returned

 

                    2021 12:00 AM  COMPREHEN METABOLIC PANEL Returned

 

                    2021 12:00 AM  ASSAY THYROID STIM HORMONE Returned

 

                    2021 12:00 AM  ASSAY OF TROPONIN QUANT Returned

 

                    2021 12:00 AM  ASSAY OF BLOOD/URIC ACID Returned

 

                    2021 12:00 AM  ASSAY OF IRON       Returned

 

                    2021 12:00 AM  IRON BINDING TEST   Returned

 

                    2021 12:00 AM  ASSAY OF TRANSFERRIN Returned

 

                    2021 12:00 AM  VITAMIN B-12        Returned

 

                    2021 12:00 AM  COMPLETE CBC W/AUTO DIFF WBC Returned

 

                    2021 12:00 AM  METABOLIC PANEL TOTAL CA Returned

 

                    2/15/2011 12:00 AM  ROUTINE VENIPUNCTURE Reviewed

 

                    2/15/2011 12:00 AM  COMPREHEN METABOLIC PANEL Reviewed

 

                    2/15/2011 12:00 AM  LIPID PANEL         Reviewed

 

                    2/15/2011 12:00 AM  ASSAY OF BLOOD/URIC ACID Reviewed

 

                    2010 12:00 AM  TTE W/O DOPPLER COMPLETE Reviewed

 

                    2015 12:00 AM  DESTRUCT PREMALG LESION Reviewed

 

                    2015 12:00 AM   COMPLETE CBC W/AUTO DIFF WBC Reviewed

 

                    2015 12:00 AM   COMPREHEN METABOLIC PANEL Reviewed

 

                    2015 12:00 AM   LIPID PANEL         Reviewed

 

                    2015 12:00 AM   GLYCOSYLATED HEMOGLOBIN TEST Reviewed

 

                    2015 12:00 AM   ASSAY OF BLOOD/URIC ACID Reviewed







Results Summary





                          Date and Description      Results

 

                          2010 9:45 AM         GLYCOHEMOGLOBIN A1C 6.30 %UR

IC ACID 5.3 mg/dLTRIGLYCERIDES 

90.0 mg/dLCHOLESTEROL 209.0 mg/dLHDL 43.0 mg/dLTOT CHOL/HDL 4.9 LDL (CALC) 148.0
 mg/dLWBC 10.0 RBC 4.75 HGB 14.10 g/dLHCT 41.0 %MCV 86.0 fLMCH 29.70 pgMCHC 
34.40 g/dLRDW SD 45 RDW CV 14.50 %MPV 10.40 fLPLT 354 NRBC# 0.00 NRBC% 0.0 %NEUT
 63.60 %%LYMP 21.60 %%MONO 5.90 %%EOS 8.30 %%BASO 0.60 %#NEUT 6.35 #LYMP 2.16 
#MONO 0.59 #EOS 0.83 #BASO 0.06 MANUAL DIFF NOT IND GLUCOSE 112.0 mg/dLSODIUM 
142.0 mmol/LPOTASSIUM 3.90 mmol/LCHLORIDE 103.0 mmol/LCO2 28.0 mmol/LBUN 20.0 
mg/dLCREATININE 0.90 mg/dLSGOT/AST 23.0 IU/LSGPT/ALT 13.0 IU/LALK PHOS 75.0 
IU/LTOTAL PROTEIN 6.90 g/dLALBUMIN 4.30 g/dLTOTAL BILI 0.50 mg/dLCALCIUM 9.0 
mg/dLAGE 70 GFR NonAA 62 GFR AA 75 eGFR >60 mL/min/1.73 m2eGFR AA* >60 

 

                          2/15/2011 9:15 AM         TRIGLYCERIDES 97.0 mg/dLCHOL

ESTEROL 212.0 mg/dLHDL 48.0 

mg/dLTOT CHOL/HDL 4.4 LDL (CALC) 145.0 mg/dLURIC ACID 5.3 mg/dLGLUCOSE 110.0 
mg/dLSODIUM 141.0 mmol/LPOTASSIUM 4.10 mmol/LCHLORIDE 102.0 mmol/LCO2 29.0 
mmol/LBUN 18.0 mg/dLCREATININE 0.90 mg/dLSGOT/AST 31.0 IU/LSGPT/ALT 17.0 IU/LALK
 PHOS 74.0 IU/LTOTAL PROTEIN 7.60 g/dLALBUMIN 4.30 g/dLTOTAL BILI 0.30 
mg/dLCALCIUM 10.20 mg/dLAGE 71 GFR NonAA 62 GFR AA 75 eGFR >60 mL/min/1.73 
m2eGFR AA* >60 

 

                          2012 9:55 AM         WBC 8.7 RBC 4.63 HGB 13.40 g

/dLHCT 40.70 %MCV 88.0 fLMCH 28.90

 pgMCHC 32.90 g/dLRDW SD 47 RDW CV 14.70 %MPV 10.20 fLPLT 388 NRBC# 0.00 NRBC% 
0.0 %NEUT 59.90 %%LYMP 22.40 %%MONO 5.60 %%EOS 11.50 %%BASO 0.60 %#NEUT 5.20 
#LYMP 1.94 #MONO 0.49 #EOS 1.00 #BASO 0.05 MANUAL DIFF NOT IND GLUCOSE 114.0 
mg/dLSODIUM 142.0 mmol/LPOTASSIUM 3.80 mmol/LCHLORIDE 104.0 mmol/LCO2 28.0 
mmol/LBUN 23.0 mg/dLCREATININE 0.90 mg/dLSGOT/AST 20.0 IU/LSGPT/ALT 15.0 IU/LALK
 PHOS 69.0 IU/LTOTAL PROTEIN 7.20 g/dLALBUMIN 4.30 g/dLTOTAL BILI 0.30 
mg/dLCALCIUM 9.90 mg/dLAGE 72 GFR NonAA 62 GFR AA 75 eGFR >60 mL/min/1.73 m2eGFR
 AA* >60 TRIGLYCERIDES 94.0 mg/dLCHOLESTEROL 217.0 mg/dLHDL 43.0 mg/dLTOT 
CHOL/HDL 5.0 LDL (CALC) 155.0 mg/dLURIC ACID 5.6 mg/dLTSH 1.920 
uIU/mLGLYCOHEMOGLOBIN A1C 6.30 %

 

                          2012 2:42 PM         COLOR YELLOW APPEARANCE HAZY

 SPEC GRAV 1.025 pH 5.5 PROTEIN 

NEGATIVE GLUCOSE NEGATIVE KETONE NEGATIVE BILIRUBIN NEGATIVE BLOOD TRACE-INTACT 
NITRITE NEGATIVE LEUK SCREEN MODERATE WBC/HPF  RBC/HPF 0-5 CASTS/LPF 
NEGATIVE CRYSTALS NEGATIVE MUCOUS THRDS NEGATIVE BACTERIA 1+ EPITH CELLS 1+ 
RENAL TRICHOMONAS NEGATIVE YEAST NEGATIVE CULT SET UP? YES 

 

                          2013 10:48 AM         WBC 10.3 RBC 4.50 HGB 13.30 

g/dLHCT 39.10 %MCV 87.0 fLMCH 

29.60 pgMCHC 34.0 g/dLRDW SD 46 RDW CV 14.60 %MPV 9.80 fLPLT 354 NRBC# 0.00 
NRBC% 0.0 %NEUT 63.60 %%LYMP 23.40 %%MONO 4.90 %%EOS 7.70 %%BASO 0.40 %#NEUT 
6.56 #LYMP 2.42 #MONO 0.51 #EOS 0.79 #BASO 0.04 MANUAL DIFF NOT IND HGB A1C 6.20
 %Est Avg Glucose 131.2 mg/dLGLUCOSE 90.0 mg/dLSODIUM 142.0 mmol/LPOTASSIUM 3.80
 mmol/LCHLORIDE 102.0 mmol/LCO2 29.0 mmol/LBUN 19.0 mg/dLCREATININE 0.90 
mg/dLSGOT/AST 24.0 IU/LSGPT/ALT 17.0 IU/LALK PHOS 77.0 IU/LTOTAL PROTEIN 7.30 
g/dLALBUMIN 4.20 g/dLTOTAL BILI 0.40 mg/dLCALCIUM 10.60 mg/dLAGE 73 GFR NonAA 62
 GFR AA 75 eGFR 60 eGFR AA* 60 

 

                          4/10/2015 9:58 AM         WBC 8.5 RBC 4.40 HGB 12.90 g

/dLHCT 39.40 %MCV 90.0 fLMCH 29.30

 pgMCHC 32.70 g/dLRDW SD 47 RDW CV 14.40 %MPV 9.90 fLPLT 393 NRBC# 0.00 NRBC% 
0.0 %NEUT 56.0 %%LYMP 25.60 %%MONO 5.40 %%EOS 12.50 %%BASO 0.50 %#NEUT 4.75 
#LYMP 2.17 #MONO 0.46 #EOS 1.06 #BASO 0.04 MANUAL DIFF NOT IND GLUCOSE 123.0 
mg/dLSODIUM 142.0 mmol/LPOTASSIUM 4.10 mmol/LCHLORIDE 105.0 mmol/LCO2 26.0 
mmol/LBUN 22.0 mg/dLCREATININE 1.10 mg/dLSGOT/AST 20.0 IU/LSGPT/ALT 15.0 IU/LALK
 PHOS 94.0 IU/LTOTAL PROTEIN 7.70 g/dLALBUMIN 4.20 g/dLTOTAL BILI 0.30 
mg/dLCALCIUM 10.50 mg/dLAGE 75 GFR NonAA 49 GFR AA 59 eGFR 49 eGFR AA* 59 
TRIGLYCERIDES 111.0 mg/dLCHOLESTEROL 201.0 mg/dLHDL 35.0 mg/dLTOT CHOL/HDL 5.7 
LDL (CALC) 144.0 mg/dLURIC ACID 4.7 mg/dLHGB A1C 6.30 %Est Avg Glucose 134.1 
mg/dL

 

                          2016 8:47 AM         WBC 7.8 RBC 4.67 HGB 13.50 g

/dLHCT 40.90 %MCV 88.0 fLMCH 28.90

 pgMCHC 33.0 g/dLRDW SD 46 RDW CV 14.50 %MPV 10.30 fLPLT 360 NRBC# 0.00 NRBC% 
0.0 %NEUT 59.50 %%LYMP 25.90 %%MONO 6.10 %%EOS 7.90 %%BASO 0.60 %#NEUT 4.65 
#LYMP 2.03 #MONO 0.48 #EOS 0.62 #BASO 0.05 MANUAL DIFF NOT IND GLUCOSE 115.0 
mg/dLSODIUM 144.0 mmol/LPOTASSIUM 3.90 mmol/LCHLORIDE 99.0 mmol/LCO2 30.0 
mmol/LBUN 30.0 mg/dLCREATININE 1.30 mg/dLSGOT/AST 19.0 IU/LSGPT/ALT 14.0 IU/LALK
 PHOS 96.0 IU/LTOTAL PROTEIN 6.90 g/dLALBUMIN 4.40 g/dLTOTAL BILI 0.40 
mg/dLCALCIUM 10.80 mg/dLAGE 76 GFR NonAA 40 GFR AA 48 eGFR 40 eGFR AA* 48 
TRIGLYCERIDES 127.0 mg/dLCHOLESTEROL 204.0 mg/dLHDL 42.0 mg/dLTOT CHOL/HDL 4.9 
LDL (CALC) 137.0 mg/dLHemoglobin A1c 6.30 %Estim. Avg Glu (eAG) 134 

 

                          3/1/2017 9:48 AM          GLUCOSE 101.0 mg/dLSODIUM 14

3.0 mmol/LPOTASSIUM 4.10 

mmol/LCHLORIDE 108.0 mmol/LCO2 25.0 mmol/LBUN 20.0 mg/dLCREATININE 1.20 
mg/dLSGOT/AST 19.0 IU/LSGPT/ALT 10.0 IU/LALK PHOS 96.0 IU/LTOTAL PROTEIN 7.60 
g/dLALBUMIN 4.0 g/dLTOTAL BILI 0.30 mg/dLCALCIUM 10.0 mg/dLAGE 77 GFR NonAA 44 
GFR AA 53 eGFR 44 eGFR AA* 53 TRIGLYCERIDES 61.0 mg/dLCHOLESTEROL 190.0 mg/dLHDL
 40.0 mg/dLTOT CHOL/HDL 4.8 LDL (CALC) 138.0 mg/dLWBC 10.8 RBC 4.50 HGB 13.10 
g/dLHCT 38.90 %MCV 86.0 fLMCH 29.10 pgMCHC 33.70 g/dLRDW SD 45 RDW CV 14.40 %MPV
 9.70 fLPLT 341 NRBC# 0.00 NRBC% 0.0 %NEUT 66.20 %%LYMP 15.80 %%MONO 4.80 %%EOS 
12.30 %%BASO 0.50 %#NEUT 7.19 #LYMP 1.71 #MONO 0.52 #EOS 1.33 #BASO 0.05 MANUAL 
DIFF NOT IND TSH 1.950 uIU/mLVITAMIN B12 331.0 pg/mL

 

                          10/12/2017 9:44 AM        Color Ur lt. yellow Glucose 

Ur-sCnc neg Bilirub Ur Ql Strip 

neg Ketones Ur Ql Strip neg Sp Gr Ur Qn 1.015 Hgb Ur Ql Strip neg pH Ur-LsCnc 
5.5 Prot Ur Ql Strip trace Urobilinogen Ur-mCnc 0.2eu/dl Nitrite Ur Ql Strip neg
 WBC Est Ur Ql Strip large 

 

                          2018 2:05 PM        Falls in last 6 months? No U

nsteady or worry about falling? 

Yes Fall Risk Assessment At Risk 

 

                          2019 10:44 AM        WBC 8.0 RBC 4.11 HGB 10.70 g

/dLHCT 34.60 %MCV 84.0 fLMCH 26.0

 pgMCHC 30.90 g/dLRDW SD 48 fLRDW CV 15.70 %MPV 9.90 fLPLT 517 NRBC# 0.00 NRBC% 
0.0 %NEUT 75.1 %LYMP 14.4 %MONO 5.9 %EOS 3.9 %BASO 0.4 #NEUT 6.02 #LYMP 1.15 
#MONO 0.47 #EOS 0.31 #BASO 0.03 MANUAL DIFF NOT IND GLUCOSE 101 SODIUM 142 
POTASSIUM 3.9 CHLORIDE 107.0 mmol/LCO2 27 BUN 18.0 mg/dLCREATININE 1.040 
mg/dLSGOT/AST 13 SGPT/ALT 6 ALK PHOS 111 TOTAL PROTEIN 7.5 ALBUMIN 3.5 TOTAL 
BILI 0.2 CALCIUM 10.20 mg/dLAGE 79 GFR NonAA 51 GFR AA 62 eGFR 51 eGFR AA* >60 
mL/min/1.73 [id number] AD 0.06 TOXIGENIC C DIFF NEGATIVE 027 NAP1 STRAIN 
PRESUMPTIVE NEGATIVE Lyme IgG/IgM Ab <0.91 Lyme Disease Ab, Quant,IgM WILL 
FOLLOW RMSF, IgG, EIA WILL FOLLOW [facility] M[employer] Spotted Fever,IgM WILL FOLLOW E. 
chaffeensis ([facility]ME) IgGTiter WILL FOLLOW E. chaffeensis ([facility]ME) IgMTiter WILL FOLLOW
 

 

                          2019 3:40 PM         Color Ur lt. yellow Glucose 

Ur-sCnc neg Bilirub Ur Ql Strip 

neg Ketones Ur Ql Strip neg Sp Gr Ur Qn 1.010 Hgb Ur Ql Strip neg pH Ur-LsCnc 
5.0 Prot Ur Ql Strip 100mg/dl Urobilinogen Ur-mCnc 0.2eu/dl Nitrite Ur Ql Strip 
neg WBC Est Ur Ql Strip neg 







History Of Immunizations





      Name  Date Admin Mfg Name Mfg Code Trade Name Lot#  Route Inj   Vis Given 

Vis Pub 

CVX

 

        Pneumococcal 10/1/2019 Not Entered NE      PREVNAR 13 [id number]  Intramuscu

lar Left Arm 

10/1/2019                 1                  133

 

        Influenza 10/1/2019 Not Entered NE      FLUZONE-HIGH DOSE [id number] Intram

uscular Left 

Arm                 10/1/2019           1            135

 

        Influenza 2020 Not Entered NE      FLUZONE-HIGH DOSE         Not Ent

ered Not Entered 

1                  135







History of Past Illness





                    Name                Date of Onset       Comments

 

                    Hypertension        2010  8:42AM  

 

                    Hyperlipidemia, unspecified 2010  8:42AM  

 

                    Arthritis unspecified                      

 

                    Heart disease                            

 

                    Hypertension                             

 

                    Hyperlipidemia                           

 

                    Gout                                     

 

                    Hypertension        2010  9:09AM  

 

                    Gout                2010  9:09AM  

 

                    Osteoarthritis      2010  9:09AM  

 

                    Hyperglycemia       2010  9:09AM  

 

                    Onychodystrophy                          

 

                    Contact Dermatitis                       

 

                    Actinic Keratosis   2015           

 

                    Onychomycosis       2015           

 

                    Seborrheic keratoses 2015           

 

                    Coronary Artery Disease Feb 15 2011  9:03AM  

 

                    Hypertension        Feb 15 2011  9:03AM  

 

                    Hyperlipidemia      Feb 15 2011  9:03AM  

 

                    Gout                Feb 15 2011  9:03AM  

 

                    Hypertension        Mar  1 2011  9:06AM  

 

                    Hyperlipidemia, unspecified Mar  1 2011  9:06AM  

 

                    Gout                Mar  1 2011  9:06AM  

 

                    Hypertension        Mar 31 2011 10:01AM  

 

                    Headache            Mar 31 2011 10:01AM  

 

                    BCC (basal cell carcinoma of skin) 2017           

 

                    Keratosis           2017           

 

                    Dyspepsia           2019           

 

                    Change in bowel habit 2019           

 

                    Hypertension        2012  8:54AM  

 

                    Hyperlipidemia, unspecified 2012  8:54AM  

 

                    Diabetes Mellitus, Type II 2012  8:54AM  

 

                    Gout                2012  8:54AM  

 

                    Night sweats        2012  8:54AM  

 

                    Dysuria             May 21 2012  2:12PM  

 

                    Chest Pain          Dec 13 2012  1:51PM  

 

                    Cough               Mar  4 2013 11:11AM  

 

                    Hypertension        Mar 15 2013 10:45AM  

 

                    Hypertension        Aug  9 2013  9:47AM  

 

                    Hyperglycemia       Aug  9 2013  9:47AM  

 

                    Onychomycosis of Fingernail Oct  7 2013  2:24PM  

 

                    Onychomycosis of Fingernail Oct  8 2013  9:23AM  

 

                    Onychomycosis of Fingernail 2014  9:35AM  

 

                    Atopic Dermatitis   2014  9:35AM  

 

                    Lung Nodule         2014  9:35AM  

 

                    Hypertension        2014  9:35AM  

 

                    Hypertension        2014  2:16PM  

 

                    Onychomycosis of Fingernail 2014  2:16PM  

 

                    Gout                2014  2:16PM  

 

                    Actinic keratosis   2015  2:14PM  

 

                    Onychomycosis       2015  2:14PM  

 

                    Seborrheic keratoses 2015  2:14PM  

 

                    Hypertension        2015  1:45PM  

 

                    Gout                2015  1:45PM  

 

                    Hyperglycemia       2015  1:45PM  

 

                    Hyperlipidemia, unspecified 2016  8:25AM  

 

                    Hyperglycemia       2016  8:25AM  

 

                    Esophageal Reflux   Sep 26 2016 11:00AM  

 

                    Hyperlipidemia, unspecified Mar  1 2017  9:27AM  

 

                    Fatigue             Mar  1 2017  9:27AM  

 

                    BCC (basal cell carcinoma of skin) Mar 24 2017 10:21AM  

 

                    BCC (basal cell carcinoma of skin) Mar 24 2017 10:21AM  

 

                    Keratosis           Mar 24 2017 10:21AM  

 

                    Hypertension        2017 11:14AM  

 

                    Hypertension        2017  8:16AM  

 

                    Gout                2017  8:16AM  

 

                    Fatigue             2017  8:16AM  

 

                    Weight loss         2017  8:16AM  

 

                    Acute cystitis without hematuria Oct 12 2017  9:21AM  

 

                    Hypertension        2018  9:14AM  

 

                    Hyperglycemia       2018  9:14AM  

 

                    Gout                2018  9:14AM  

 

                    Hypertension        May  4 2018  8:41AM  

 

                    TMJ syndrome        May  4 2018  8:41AM  

 

                    Chest pain          2018 11:10AM  

 

                    Dyspnea             2018 11:10AM  

 

                    Risk for falls      Dec 18 2018  2:05PM  

 

                    Gastritis           2019  9:00AM  

 

                    Upper Respiratory Infection Mar 21 2019  8:56AM  

 

                    IBS (irritable bowel syndrome) 2019  9:13AM  

 

                    Irritable bowel syndrome (IBS) 2019  3:27PM  

 

                    Blood tests prior to treatment or procedure 2019  3:2

7PM  

 

                    Renal cyst          May  8 2019 12:28PM  

 

                    Shortness Of Breath May 16 2019  9:52AM  

 

                    Chest pain          May 16 2019  9:52AM  

 

                    Diarrhea            May 16 2019  9:52AM  

 

                    GERD without esophagitis May 16 2019  9:52AM  

 

                                        Bitten or stung by nonvenomous insect an

d other nonvenomous arthropods, initial 

encounter                 May 16 2019  9:52AM        

 

                    Hypertension        May 21 2019  9:06AM  

 

                    Anemia              May 21 2019  9:06AM  

 

                    Gastritis           May 21 2019  9:06AM  

 

                    Change in bowel habit May 28 2019  8:40AM  

 

                    Dyspepsia           May 28 2019  8:40AM  

 

                    Abdominal Pain      [place] 10 2019 12:24PM  

 

                    Dyspepsia           [place] 10 2019 12:24PM  

 

                    Anemia, Iron Deficiency 2019 11:09AM  

 

                    Hypokalemia         2019 11:09AM  

 

                    Dyspepsia           2019  2:17PM  

 

                    Diarrhea            2019  2:17PM  

 

                    Biliary dyskinesia  2019  2:17PM  

 

                    Basal cell carcinoma Aug  7 2019  1:58PM  

 

                    Sebaceous cyst      Aug  7 2019  1:58PM  

 

                    Basal cell carcinoma of skin, site unspecified Aug  7 2019  

1:59PM  

 

                    Dyspnea             Oct 11 2019 10:58AM  

 

                    Chest pain          Oct 11 2019 10:58AM  

 

                    Blood tests prior to treatment or procedure Oct 14 2019 12:1

3PM  

 

                    Coronary Artery Disease 2020  3:09PM  

 

                    Hypertension        2020  3:09PM  

 

                    Hyperlipidemia      2020  3:09PM  

 

                    Pneumonia           2020  3:12PM  

 

                    Abnormal CT scan, chest 2020  3:12PM  

 

                    Anemia              2021 10:51AM  

 

                    Fatigue             2021 10:51AM  

 

                    Hypertension        2021 10:03AM  

 

                    Chest pain          2021 10:03AM  

 

                    CAD (coronary artery disease) 2021 10:03AM  

 

                    Gout                2021 10:03AM  

 

                    Anemia, Iron Deficiency 2021  3:28PM  

 

                    Anemia              Sep  3 2021 11:24AM  

 

                    Fatigue             Sep  3 2021 11:24AM  

 

                    CAD (coronary artery disease) Sep  3 2021 11:24AM  

 

                    Dyspnea             Sep  3 2021 11:24AM  

 

                    Cellulitis          Sep  3 2021 10:48AM  

 

                    Chronic Obstructive Pulmonary Disease Sep  3 2021 10:48AM  

 

                    PAD (peripheral artery disease) Sep  3 2021 10:48AM  







Payers





           Insurance Name Company Name Plan Name  Plan Number Policy Number Antony

cy Group 

Number                                  Start Date

 

                    Medicare RHC Medicare RH           [id number]           N/

A

 

                    AARP      AARP                [id number]           N/A

 

                    Railroad Medicare Railroad Medicare           [id number]   

        

 

                    BCBS      [employer] [place]           [id number]           N/

A

 

                    Railroad Medicare - Railroad Railroad Medicare           [id number]

[id number]           N/A

 

                    Medicare Part A Medicare Part A           [id number]       

    N/A

 

                    Medicare Part A Medicare - Lab/Xray           [id number]   

        N/A

 

                    Medicare RHC Medicare RHC           [id number]           N/

A

 

                    Medicare Part B Medicare [place]           [id number]    

       N/A







History of Encounters





                    Visit Date          Visit Type          Provider

 

                    9/3/2021            Office visit        Joe Conley MD

 

                    2021           Office visit        Joe Conley MD

 

                    10/11/2019          Office visit        Joe Conley MD

 

                    2019           Procedures          Mike Bouman DO

 

                    2019            Procedures          Mike Bouman DO

 

                    2019           Surgery             Mike Bouman DO

 

                    2019           [facility]            CHERISE Castillo MD

 

                    2019           Surgery             Mike Bouman DO

 

                    6/10/2019           Surgery             Mike Bouman DO

 

                    2019           Office visit        Mike Mantilla DO

 

                    2019           Office visit        Joe Conley MD

 

                    2019           Office visit        Ailyn BEARD

 

                    2019           Office visit        Joe Conley MD

 

                    3/21/2019           Office visit        Joe Conley MD

 

                    2019            Office visit        Joe Conley MD

 

                    2019            [facility]            CHERISE Castillo MD

 

                    2018          Office visit        Joe Conley MD

 

                    2018            Office visit        Joe Conley MD

 

                    2018           Office visit        Joe oCnley MD

 

                    10/12/2017          Office visit        Ailyn BEARD

 

                    2017            Office visit        Joe Conley MD

 

                    2017           Office visit        Joe Conley MD

 

                    2017            Procedures          Mike Mantilla DO

 

                    3/24/2017           Procedures          Mike Mantilla DO

 

                    3/1/2017            Office visit        Joe Conley MD

 

                    2016           Office visit        Joe Conley MD

 

                    2016           Office visit        Joe Conley MD

 

                    2015            Office visit        Joe Conley MD

 

                    2015           Office visit        ADIS SAGASTUME

 

                    2014           Office visit        ADIS SAGASTUME

 

                    2014           Office visit        Joe Conley MD

 

                    2014           Office visit        Joe oCnley MD

 

                    10/7/2013           Office visit        Joe Conley MD

 

                    2013            Office visit        Joe Conley MD

 

                    3/15/2013           Office visit        Joe Conley MD

 

                    2012          Office visit        Joe Conley MD

 

                    2012           Office visit        Joe Conley MD

 

                    3/31/2011           Office visit        Joe Conley MD

 

                    3/1/2011            Office visit        Joe Conley MD

 

                    2010           Laboratory          Ramy Raines MD

 

                    2010           Office visit        Joe Conley MD

 

                    2010           Office visit        Joe Conley MD

 

                    10/5/2009           Office visit        Joe Conley MD

## 2022-04-12 ENCOUNTER — HOSPITAL ENCOUNTER (INPATIENT)
Dept: HOSPITAL 75 - CATH | Age: 82
LOS: 1 days | Discharge: ANOTHER HOSPITAL | DRG: 287 | End: 2022-04-13
Attending: INTERNAL MEDICINE | Admitting: INTERNAL MEDICINE
Payer: MEDICARE

## 2022-04-12 VITALS — SYSTOLIC BLOOD PRESSURE: 127 MMHG | DIASTOLIC BLOOD PRESSURE: 61 MMHG

## 2022-04-12 VITALS — SYSTOLIC BLOOD PRESSURE: 113 MMHG | DIASTOLIC BLOOD PRESSURE: 56 MMHG

## 2022-04-12 VITALS — HEIGHT: 65.98 IN | BODY MASS INDEX: 24.87 KG/M2 | WEIGHT: 154.76 LBS

## 2022-04-12 VITALS — DIASTOLIC BLOOD PRESSURE: 54 MMHG | SYSTOLIC BLOOD PRESSURE: 114 MMHG

## 2022-04-12 VITALS — DIASTOLIC BLOOD PRESSURE: 68 MMHG | SYSTOLIC BLOOD PRESSURE: 139 MMHG

## 2022-04-12 VITALS — SYSTOLIC BLOOD PRESSURE: 115 MMHG | DIASTOLIC BLOOD PRESSURE: 56 MMHG

## 2022-04-12 VITALS — SYSTOLIC BLOOD PRESSURE: 114 MMHG | DIASTOLIC BLOOD PRESSURE: 54 MMHG

## 2022-04-12 DIAGNOSIS — Z88.1: ICD-10-CM

## 2022-04-12 DIAGNOSIS — I27.21: ICD-10-CM

## 2022-04-12 DIAGNOSIS — I34.0: ICD-10-CM

## 2022-04-12 DIAGNOSIS — I70.1: ICD-10-CM

## 2022-04-12 DIAGNOSIS — N18.30: ICD-10-CM

## 2022-04-12 DIAGNOSIS — Z99.89: ICD-10-CM

## 2022-04-12 DIAGNOSIS — E78.2: ICD-10-CM

## 2022-04-12 DIAGNOSIS — Z88.5: ICD-10-CM

## 2022-04-12 DIAGNOSIS — Z95.5: ICD-10-CM

## 2022-04-12 DIAGNOSIS — T82.855A: ICD-10-CM

## 2022-04-12 DIAGNOSIS — I65.29: ICD-10-CM

## 2022-04-12 DIAGNOSIS — I12.9: ICD-10-CM

## 2022-04-12 DIAGNOSIS — Z79.82: ICD-10-CM

## 2022-04-12 DIAGNOSIS — Z79.899: ICD-10-CM

## 2022-04-12 DIAGNOSIS — Z88.8: ICD-10-CM

## 2022-04-12 DIAGNOSIS — R63.4: ICD-10-CM

## 2022-04-12 DIAGNOSIS — D50.9: ICD-10-CM

## 2022-04-12 DIAGNOSIS — I70.203: ICD-10-CM

## 2022-04-12 DIAGNOSIS — Z90.49: ICD-10-CM

## 2022-04-12 DIAGNOSIS — I25.110: Primary | ICD-10-CM

## 2022-04-12 DIAGNOSIS — G47.33: ICD-10-CM

## 2022-04-12 DIAGNOSIS — F41.9: ICD-10-CM

## 2022-04-12 DIAGNOSIS — I42.9: ICD-10-CM

## 2022-04-12 LAB
ALBUMIN SERPL-MCNC: 3.7 GM/DL (ref 3.2–4.5)
ALP SERPL-CCNC: 108 U/L (ref 40–136)
ALT SERPL-CCNC: 9 U/L (ref 0–55)
APTT BLD: 31 SEC (ref 24–35)
BILIRUB SERPL-MCNC: 0.3 MG/DL (ref 0.1–1)
BUN/CREAT SERPL: 21
CALCIUM SERPL-MCNC: 9.7 MG/DL (ref 8.5–10.1)
CHLORIDE SERPL-SCNC: 106 MMOL/L (ref 98–107)
CHOLEST SERPL-MCNC: 178 MG/DL (ref ?–200)
CO2 SERPL-SCNC: 26 MMOL/L (ref 21–32)
CREAT SERPL-MCNC: 1.07 MG/DL (ref 0.6–1.3)
GFR SERPLBLD BASED ON 1.73 SQ M-ARVRAT: 53 ML/MIN
GLUCOSE SERPL-MCNC: 117 MG/DL (ref 70–105)
HCT VFR BLD CALC: 33 % (ref 35–52)
HDLC SERPL-MCNC: 41 MG/DL (ref 40–60)
HGB BLD-MCNC: 10.1 G/DL (ref 11.5–16)
INR PPP: 1.1 (ref 0.8–1.4)
MCH RBC QN AUTO: 25 PG (ref 25–34)
MCHC RBC AUTO-ENTMCNC: 30 G/DL (ref 32–36)
MCV RBC AUTO: 82 FL (ref 80–99)
PLATELET # BLD: 370 10^3/UL (ref 130–400)
PMV BLD AUTO: 9.9 FL (ref 9–12.2)
POTASSIUM SERPL-SCNC: 4 MMOL/L (ref 3.6–5)
PROT SERPL-MCNC: 7 GM/DL (ref 6.4–8.2)
PROTHROMBIN TIME: 14.2 SEC (ref 12.2–14.7)
SODIUM SERPL-SCNC: 144 MMOL/L (ref 135–145)
TRIGL SERPL-MCNC: 92 MG/DL (ref ?–150)
VLDLC SERPL CALC-MCNC: 18 MG/DL (ref 5–40)
WBC # BLD AUTO: 8.2 10^3/UL (ref 4.3–11)

## 2022-04-12 PROCEDURE — 93567 NJX CAR CTH SPRVLV AORTGRPHY: CPT

## 2022-04-12 PROCEDURE — 85027 COMPLETE CBC AUTOMATED: CPT

## 2022-04-12 PROCEDURE — 85730 THROMBOPLASTIN TIME PARTIAL: CPT

## 2022-04-12 PROCEDURE — 83735 ASSAY OF MAGNESIUM: CPT

## 2022-04-12 PROCEDURE — 4A023N7 MEASUREMENT OF CARDIAC SAMPLING AND PRESSURE, LEFT HEART, PERCUTANEOUS APPROACH: ICD-10-PCS | Performed by: INTERNAL MEDICINE

## 2022-04-12 PROCEDURE — 80061 LIPID PANEL: CPT

## 2022-04-12 PROCEDURE — 87081 CULTURE SCREEN ONLY: CPT

## 2022-04-12 PROCEDURE — B2151ZZ FLUOROSCOPY OF LEFT HEART USING LOW OSMOLAR CONTRAST: ICD-10-PCS | Performed by: INTERNAL MEDICINE

## 2022-04-12 PROCEDURE — 80048 BASIC METABOLIC PNL TOTAL CA: CPT

## 2022-04-12 PROCEDURE — 36415 COLL VENOUS BLD VENIPUNCTURE: CPT

## 2022-04-12 PROCEDURE — 80053 COMPREHEN METABOLIC PANEL: CPT

## 2022-04-12 PROCEDURE — B2111ZZ FLUOROSCOPY OF MULTIPLE CORONARY ARTERIES USING LOW OSMOLAR CONTRAST: ICD-10-PCS | Performed by: INTERNAL MEDICINE

## 2022-04-12 PROCEDURE — 93458 L HRT ARTERY/VENTRICLE ANGIO: CPT

## 2022-04-12 PROCEDURE — 85610 PROTHROMBIN TIME: CPT

## 2022-04-12 RX ADMIN — SODIUM CHLORIDE SCH MLS/HR: 900 INJECTION, SOLUTION INTRAVENOUS at 15:00

## 2022-04-12 RX ADMIN — SODIUM CHLORIDE SCH MLS/HR: 900 INJECTION, SOLUTION INTRAVENOUS at 23:20

## 2022-04-12 RX ADMIN — ACETAMINOPHEN PRN MG: 325 TABLET ORAL at 20:15

## 2022-04-12 RX ADMIN — AMLODIPINE BESYLATE SCH MG: 5 TABLET ORAL at 20:14

## 2022-04-12 NOTE — DISCHARGE INST-POST CATH
Discharge Inst-CATH/EP


Post Cardiac Cath/EP D/C Inst


Follow Up/Plan


F/u with Dr Maya 2 weeks


F/u with Dr Tamez next week








ACTIVITY





* Go Home directly and rest.


* Limit activity of the leg (or wrist if it was used) for 7 days including 

aerobics, swimming,


   jogging, bicycling, etc.


* Restrict stair-climbing for 7 days if possible, if not, climb up with your 

non-cath leg, then


   bring together on the same step.


* Avoid lifting, pushing, pulling or excessive movement of the affected 

extremity for 7 days.


* Customary sexual activity may be resumed after 2 days-use caution not to use a

position  


   that strains or causes pain to the affected extremity.


* No driving for 24 hours.


* NO SMOKING. 


* Avoid straining for bowel movements for 7 days.


* Gentle walking on level ground is allowed.


* Returning to work will depend on the type of procedure and the results. Your 

doctor will discuss


   this with you.





CALL YOUR DOCTOR FOR ANY OF THE FOLLOWING:





*If bleeding from the puncture site occurs- Apply gentle pressure to site with 

clean cloth and call


   your doctor or EMS.


* If a knot or lump forms under the skin, increases in size, or causes pain.


* If bruising appears to be worsening or moving further down your leg instead of

disappearing.


* Temperature above 101 F.





CARE OF YOUR GROIN INCISION;





* Bruising or purple discoloration of the skin near the puncture site is common.


* You may shower only, no bathtub bathing for 5 days.  Be careful to avoid 

slipping as your


   leg may feel stiff.


* If a closure device was used on your femoral artery, please see the attached 

guide regarding


   care of the device and your leg.


* Leave dressing on FOR 24 hours.





CARE OF YOUR WRIST INCISION;





* Bruising or purple discoloration of the skin near the puncture site is common.


* You may shower.


* DO NOT submerge wrist.


* Leave dressing on FOR 24 hours.











EZEKIEL MAYA MD FACP FAC CCDS   Apr 12, 2022 13:32

## 2022-04-12 NOTE — PROGRESS NOTE - CARDIOLOGY
Cardiology SOAP Progress Note


Subjective:


Currently denies any c/o CP at this time


States she had an episode of mid-sternal, sharp, stabbing, burning chest pain


No assoc SOB, diaphoresis or nausea


Currently pain free





Objective:


I&O/Vital Signs











 22





 20:30 20:45 21:00 21:15


 


Pulse 66 58 57 58


 


Resp 15 15 14 14


 


B/P (MAP) 133/61 101/50 92/47 103/46


 


Pulse Ox 96 96 96 96


 


O2 Delivery   High Flow N/C 


 


O2 Flow Rate   10.00 





 22





 21:30 21:45 22:00 22:15


 


Pulse 60 57 57 57


 


Resp 14 16 20 13


 


B/P (MAP) 92/47 103/49 99/48 104/48


 


Pulse Ox 96 95 96 96


 


O2 Delivery   High Flow N/C 


 


O2 Flow Rate   10.00 





 22





 22:30 22:45 23:00 23:00


 


Temp   36.6 


 


Pulse 57 58  57


 


Resp 14 14  12


 


B/P (MAP) 103/52 102/49  105/49


 


Pulse Ox 97 98  98


 


O2 Delivery   High Flow N/C High Flow N/C


 


O2 Flow Rate   7.00 7.00


 


    





 22





 23:15 23:16 23:30 23:45


 


Pulse 57  62 58


 


Resp 13  9 13


 


B/P (MAP) 111/52  110/57 122/54


 


Pulse Ox 96  96 98


 


O2 Delivery  High Flow N/C  


 


O2 Flow Rate  7.00  





 22





 00:00 00:15 00:20 00:30


 


Pulse 56 56  57


 


Resp 13 17  12


 


B/P (MAP) 113/50 111/51  107/48


 


Pulse Ox 98 99  95


 


O2 Delivery High Flow N/C  High Flow N/C 


 


O2 Flow Rate 7.00  4.00 





 22





 00:35 00:45 01:00 01:00


 


Pulse  56 56 56


 


Resp  10 15 


 


B/P (MAP)  106/49 111/53 


 


Pulse Ox  95 95 


 


O2 Delivery Nasal Cannula  High Flow N/C 


 


O2 Flow Rate 4.00  4.00 





 22





 01:15 01:30 01:45 02:00


 


Pulse 55 55 58 55


 


Resp 10 13 11 13


 


B/P (MAP) 103/48 103/49 112/54 104/51


 


Pulse Ox 96 95 95 96


 


O2 Delivery    High Flow N/C


 


O2 Flow Rate    4.00





 22





 02:15 02:30 02:45 03:00


 


Temp    36.6


 


Pulse 54 55 55 


 


Resp 11 12 12 


 


B/P (MAP) 107/42 116/48 105/48 


 


Pulse Ox 96 96 96 


 


O2 Delivery    High Flow N/C


 


O2 Flow Rate    4.00


 


    





 22





 03:00 03:15 03:30 03:45


 


Pulse 59 55 55 56


 


Resp 14 13 13 14


 


B/P (MAP) 102/47 116/51 116/53 130/60


 


Pulse Ox 96 95 97 93


 


O2 Delivery High Flow N/C   


 


O2 Flow Rate 4.00   





 22





 03:56 04:00 04:15 04:30


 


Pulse  53 53 53


 


Resp  13 10 13


 


B/P (MAP)  120/53 114/56 116/54


 


Pulse Ox  96 96 92


 


O2 Delivery High Flow N/C High Flow N/C  


 


O2 Flow Rate 4.00 4.00  





 22





 04:45 05:00 05:15 05:30


 


Pulse 52 55 55 56


 


Resp 13 13 14 13


 


B/P (MAP) 117/53 109/53 110/57 115/47


 


Pulse Ox 95 94 95 95


 


O2 Delivery  High Flow N/C  


 


O2 Flow Rate  4.00  





 22





 05:45 06:00 07:00 08:00


 


Pulse 54 59 60 


 


Resp 13 14  


 


B/P (MAP) 117/50 118/53  


 


Pulse Ox 96 95  


 


O2 Delivery  High Flow N/C  Nasal Cannula


 


O2 Flow Rate  4.00  4.00





 22   





 08:00   


 


Temp 36.6   














 22





 00:00


 


Intake Total 240 ml


 


Output Total 750 ml


 


Balance -510 ml








Side:  left


Groin site without hematoma:  Yes


Condition:  extremity w/d/p


Bruising:  mild bruising


Constitutional:  AAO x 3, well-developed, well-nourished


Respiratory:  No accessory muscle use, No respiratory distress; chest expansion 

is symmetric, chest is bilaterally symmetric, lungs clear to auscultation


Cardiovascular:  regular rate-rhythm; No JVD; S1 and S2, systolic murmur


Gastrointestional:  No tender; soft, round, audible bowel sounds


Extremities:  no lower extremity edema bilateral


Neurologic/Psychiatric:  grossly intact (moves all extremities)


Skin:  No rash on exposed areas, No ulcerations on exposed areas





Results/Procedures:


Labs


Laboratory Tests


22 09:28: 


White Blood Count 8.2, Red Blood Count 4.07, Hemoglobin 10.1L, Hematocrit 33L, 

Mean Corpuscular Volume 82, Mean Corpuscular Hemoglobin 25, Mean Corpuscular 

Hemoglobin Concent 30L, Red Cell Distribution Width 14.9H, Platelet Count 370, 

Mean Platelet Volume 9.9, Prothrombin Time 14.2, INR Comment 1.1, Activated 

Partial Thromboplast Time 31, Sodium Level 144, Potassium Level 4.0, Chloride 

Level 106, Carbon Dioxide Level 26, Anion Gap 12, Blood Urea Nitrogen 23H, 

Creatinine 1.07, Estimat Glomerular Filtration Rate 53, BUN/Creatinine Ratio 21,

Glucose Level 117H, Calcium Level 9.7, Corrected Calcium 9.9, Total Bilirubin 

0.3, Aspartate Amino Transf (AST/SGOT) 15, Alanine Aminotransferase (ALT/SGPT) 

9, Alkaline Phosphatase 108, Total Protein 7.0, Albumin 3.7, Triglycerides Level

92, Cholesterol Level 178, LDL Cholesterol Direct 127, VLDL Cholesterol 18, HDL 

Cholesterol 41


22 04:25: 


White Blood Count 7.0, Red Blood Count 3.61L, Hemoglobin 9.1L, Hematocrit 29L, 

Mean Corpuscular Volume 81, Mean Corpuscular Hemoglobin 25, Mean Corpuscular 

Hemoglobin Concent 31L, Red Cell Distribution Width 14.7H, Platelet Count 320, 

Mean Platelet Volume 9.8, Sodium Level 143, Potassium Level 3.9, Chloride Level 

108H, Carbon Dioxide Level 23, Anion Gap 12, Blood Urea Nitrogen 19H, Creatinine

0.92, Estimat Glomerular Filtration Rate 63, BUN/Creatinine Ratio 21, Glucose 

Level 81, Calcium Level 9.2, Magnesium Level 2.1





Procedures


S/P cardiac cath on 22 - please see the cardiac cath report for details





A/P:


Assessment:


Chest pain


- currently pain free





CAD


- MPI of 3-9-21: Anteroapical ischemia, at least moderate in amount. 

Anteroapical hypokinesis. Well preserved global left ventricular systolic 

function with a calculated ejection fraction of 63%.


- Cardiac cath of 10-5-21 showed Coronary artery disease primarily consisting of

approximately 70% ostialstenosis of a diagonal branch of the left anterior 

descending artery that originates from a stented segment of the left anterior 

descending. The left anterior descending artery stent is known to be an Alpine 

Xience 2.75 x 28 mm that was placed in 2021. The stent is widely patent and 

free of significant disease. The first obtuse marginal branch of the left 

circumflex artery is occluded in its proximal portion. The rest of the coronary 

vessels have diffuse mild to moderate plaque. Left ventricular end-diastolic 

pressure of 14 mmHg


- Echo of 3/9/21: LVEF 55-60%, grade 2 diastolic dysfunction, mild to mod LAE, 

mod MR, mild AI, mod TR, PASP 65-70 mmHg


- Cardiac cath of 22:  Multivessel coronary artery disease including long, 

up to 80% stenosis of the proximal and mid left anterior descending and 90% 

ostial stenosis of the first diagonal.  The left circumflex and the right 

coronary arteries have diffuse moderate disease.  The left circumflex artery, 

first obtuse marginal branch is occluded at its ostium.  The right coronary 

artery exhibits 60% proximal stenosis. Normal left ventricular end-diastolic 

pressure. Normal global left ventricular systolic function with an ejection 

fraction approximately 60% to 65%. - Referred to Dr. Tamez for consideration of

CABG





PAD


- Peripheral angiogram 21 showed 80 to 90% ostial stenosis of the right re

nal artery. Long segment of complete occlusion of the right superficial femoral 

artery that reconstitutes at the level of the right popliteal artery and then 

there is a single vessel runoff in the right leg. Multiple 80% to 90% stenosis 

in the left superficial femoral artery and a 2-vessel runoff in the left leg 

(these two runoff vessels have severe proximal disease, as well)


- S/p R fem-pop by Dr Beck on 21. R leg swelling, post-op, followed by Dr Beck's office


- PCI to R leg by Dr Tamez for graft disease in 2022





Microcytic, hypochromic anemia


- of undetermined etiology, managed by pcp (H/H 10.5/33.9 and MCV 80 on 

1)





Renal insufficiency


- likely CKD 2-3 and superimposed prerenal azotemia (BUN/Cr 60/1.99 on 21 

and 33/1.22 after d/c HCTZ)





Involuntary wgt loss


- (60 lbs since 2019) of undetermined etiology, managed by pcp





HTN:


- on chronic diuretic therapy





HLD:


- refuses stain or PCSK-19 inhibitors


- Reported intolerance to statin d/t joint pain





Abnormal ECG:


- ECG of 21 shows NSR and LAFB


- ECG of 22: NSR, LAFB, prob LVH





GI history:


- cholecystectomy in 2019; endoscopy in 2019 that showed mild gastritis (per pt 

report)





Fam h/o early CAD


- (father  of MI at age 54)





Carotid dz


- u/s of 21 showed minimal dz





FLYNN


- CPAP tx


Plan:


Chest pain post cardiac cath with known h/o CAD who has been referred to Dr. Tamez of [facility] for consideration of CABG


- resume home medications including BB, long acting nitrate, anti-platelet tx 

and increase calcium channel blocker


- 2L/NC of oxygen


Monitor lab closely


Replace electrolytes as indicated


PRN sublingual nitro as needed











LUZ CACERES           2022 15:22

## 2022-04-12 NOTE — CONSULTATION-CARDIOLOGY
HPI-Cardiology


Cardiology Consultation:


Date of Consultation


22


Time Seen by a Provider:  16:00


Date of Admission





Attending Physician


[facility]


Admitting Physician


Mallory,Local Physician


Consulting Physician


BEBETO MAYA MD, FACP, [facility]





HPI:


Chief Complaint:


Chest pain





81 yo woman with known CAD who has been having intermittent chest pain that has 

been relieved with s/l NTG. Today, underwent card cath and found to have 

considerable CAD. Referred for CABG to Dr Tamez at [facility]. Original plan 

was to d/c after cath with outpt f/u with Dr Tamez. But she started to have cp 

relieved with s/l NTG and supple oxygen in the recovery room (post-cath). Also 

notes considerable anxiety. Wishes to get CABG over with as quickly as possible.

Has been admitted to the hosp because of ongoing symptoms. Denies palp or 

syncope. Has chronic, mod, exertional shortness of breath. Denies swelling.





Review of Systems-Cardiology


Review of Systems


Constitutional:  malaise, tiredness; No weight loss, No weight gain


Eyes:  No vision change


Ears/Nose/Throat:  No nasal drainage, No recent hearing loss


Respiratory:  As described under HPI


Cardiovascular:  As described under HPI


Gastrointestinal:  No diarrhea, No nausea, No vomiting


Genitourinary:  No dysuria, No hematuria, No urine frequency changes


Musculoskeletal:  No back pain


Skin:  No rash, No ulcerations


Psychiatric/Neurological:  No seizure, No focal weakness, No syncope


Hematologic:  No bleeding abnormalities





PMH-Social-Family Hx


Patient Social History


2nd Hand Smoke Exposure:  Yes





Immunizations Up To Date


Date of Pneumonia Vaccine:  Oct 23, 2019


Date of Influenza Vaccine:  Oct 27, 2020





Past Medical History


PMH


As described under Assessment.





Family Medical History


Family Medical History:  


Does not report fam h/o early CAD





Allergies and Home Medications


Allergies


Coded Allergies:  


     Statins-Hmg-Coa Reductase Inhibitor (Unverified  Allergy, Unknown, 3/9/21)


     meperidine (Unverified  Allergy, Unknown, 3/9/21)





Patient Home Medication List


Home Medication List Reviewed:  Yes


Acetaminophen (Tylenol) 325 Mg Tablet, 650 MG PO Q6H PRN for PAIN-MILD (1-4), 

(Reported)


   Entered as Reported by: SANJUANA MERA on 10/5/21 0803


   Last Action: Reviewed


Allopurinol (Allopurinol) 300 Mg Tablet, 300 MG PO DAILY, (Reported)


   Entered as Reported by: CHRISTINA CAPONE on 21


   Last Action: Reviewed


Amlodipine Besylate (Amlodipine Besylate) 5 Mg Tablet, 5 MG PO DAILY, (Reported)


   Entered as Reported by: YOSSI SOLOMON on 22


   Last Action: Reviewed


Ascorbic Acid/Ascorbate Sodium (Vitamin C 500 mg Wafer) 500 Mg Wafer, 500 MG PO 

BID, (Reported)


   Entered as Reported by: SANJUANA MERA on 10/5/21 0803


   Last Action: Reviewed


Aspirin (Aspirin EC) 81 Mg Tablet.dr, 81 MG PO HS, (Reported)


   Entered as Reported by: SANJUANA MERA on 10/5/21 0803


   Last Action: Reviewed


Carvedilol (Carvedilol) 25 Mg Tablet, 25 MG PO BID, (Reported)


   Entered as Reported by: CHRISTINA CAPONE on 21


   Last Action: Reviewed


Clopidogrel Bisulfate (Clopidogrel) 75 Mg Tablet, 75 MG PO DAILY, (Reported)


   Entered as Reported by: CHRISTINA CAPONE on 21


   Last Action: Reviewed


Cyanocobalamin (Vitamin B-12) (Vitamin B-12) 1,000 Mcg Tablet, 1,000 MCG PO 

DAILY, (Reported)


   Entered as Reported by: SANJUANA MERA on 10/5/21 0803


   Last Action: Reviewed


Escitalopram Oxalate (Escitalopram Oxalate) 10 Mg Tablet, 10 MG PO DAILY, 

(Reported)


   Entered as Reported by: YOSSI SOLOMON on 22


   Last Action: Reviewed


Ferrous Sulfate (Iron) 325 Mg Tablet, 325 MG PO DAILY, (Reported)


   Entered as Reported by: CHRISTINA CAPONE on 21


   Last Action: Reviewed


Hydrocodone Bit/Acetaminophen (HYDROcodone/APAP 10/325 TABLET) 1 Ea Tab, 1 EA PO

Q6H PRN for PAIN-MODERATE (5-7), (Reported)


   Entered as Reported by: YOSSI SOLOMON on 22


   Last Action: Reviewed


Isosorbide Mononitrate (Isosorbide Mononitrate ER) 120 Mg Tab.er.24h, 120 MG PO 

DAILY, (Reported)


   Entered as Reported by: YOSSI SOLOMON on 22


   Last Action: Reviewed


Multivitamin/Iron/Folic Acid (Centrum Adults Tablet) 1 Each Tablet, 1 EACH PO 

DAILY, (Reported)


   Entered as Reported by: SANJUANA MERA on 10/5/21 0803


   Last Action: Reviewed


Nitroglycerin (Nitroglycerin) 0.4 Mg Tab.subl, 0.4 MG SL UD PRN for CHEST PAIN, 

(Reported)


   Entered as Reported by: CHRISTINA CAPONE on 21


   Last Action: Reviewed


Ondansetron (Ondansetron Odt) 4 Mg Tab.rapdis, 4 MG PO Q4H PRN for 

NAUSEA/VOMITING, (Reported)


   Entered as Reported by: YOSSI SOLOMON on 22 1000


   Last Action: Reviewed


Pantoprazole Sodium (Pantoprazole Sodium) 40 Mg Tablet.dr, 40 MG PO DAILY, (Repo

rted)


   Entered as Reported by: SANJUANA MERA on 10/5/21 0803


   Last Action: Reviewed


Discontinued Medications


Biotin (Biotin) 5,000 Mcg Tab.rapdis, 5,000 MCG PO DAILY, (Reported)


   Discontinued Reason: No Longer Taking


   Entered as Reported by: SANJUANA MERA on 10/5/21 0803


   Last Action: Discontinued


Isosorbide Mononitrate (Isosorbide Mononitrate ER) 30 Mg Tab.er.24h, 30 MG PO 

DAILY, (Reported)


   Discontinued Reason: Duplicate Order


   Entered as Reported by: CHRISTINA CAPONE on 21


   Last Action: Discontinued





Physical Exam-Cardiology


Physical Exam


Vital Signs/I&O











 22





 09:25 14:30 14:34 14:45


 


Temp 36.4   


 


Pulse 63 62 63 51


 


Resp 18 14  13


 


B/P (MAP) 139/68 (91) 127/61 (83)  115/56 (75)


 


Pulse Ox 93 94  96


 


O2 Delivery Room Air Nasal Cannula  Nasal Cannula


 


O2 Flow Rate  2.00  2.00


 


    





 22  





 15:00 15:15  


 


Pulse 55 56  


 


Resp 15 15  


 


B/P (MAP) 113/56 (75) 114/54 (74)  


 


Pulse Ox 97 94  


 


O2 Delivery Nasal Cannula Nasal Cannula  


 


O2 Flow Rate 2.00 2.00  





Capillary Refill :


Constitutional:  AAO x 3, well-developed, well-nourished


Respiratory:  No accessory muscle use, No respiratory distress; chest expansion 

is symmetric, chest is bilaterally symmetric, lungs clear to auscultation


Cardiovascular:  regular rate-rhythm; No JVD; S1 and S2, systolic murmur


Gastrointestinal:  No tender; soft, round, audible bowel sounds


Extremities:  no lower extremity edema bilateral


Neurologic/Psychiatric:  other (moves all limbs equally, appears anxious)


Skin:  No rash on exposed areas, No ulcerations on exposed areas





Data Review


Labs


Laboratory Tests


22 09:28: 


White Blood Count 8.2, Red Blood Count 4.07, Hemoglobin 10.1L, Hematocrit 33L, 

Mean Corpuscular Volume 82, Mean Corpuscular Hemoglobin 25, Mean Corpuscular 

Hemoglobin Concent 30L, Red Cell Distribution Width 14.9H, Platelet Count 370, M

andres Platelet Volume 9.9, Prothrombin Time 14.2, INR Comment 1.1, Activated 

Partial Thromboplast Time 31, Sodium Level 144, Potassium Level 4.0, Chloride 

Level 106, Carbon Dioxide Level 26, Anion Gap 12, Blood Urea Nitrogen 23H, 

Creatinine 1.07, Estimat Glomerular Filtration Rate 53, BUN/Creatinine Ratio 21,

Glucose Level 117H, Calcium Level 9.7, Corrected Calcium 9.9, Total Bilirubin 

0.3, Aspartate Amino Transf (AST/SGOT) 15, Alanine Aminotransferase (ALT/SGPT) 

9, Alkaline Phosphatase 108, Total Protein 7.0, Albumin 3.7, Triglycerides Level

92, Cholesterol Level 178, LDL Cholesterol Direct 127, VLDL Cholesterol 18, HDL 

Cholesterol 41





Laboratory Tests


22 09:28











A/P-Cardiology


Assessment/Admission Diagnosis


Recurrent angina


- unstable symptoms





CAD


- MPI of 3-9-21: Anteroapical ischemia, at least moderate in amount. 

Anteroapical hypokinesis. Well preserved global left ventricular systolic 

function with a calculated ejection fraction of 63%.


- Cardiac cath of 10-5-21 showed Coronary artery disease primarily consisting of

approximately 70% ostialstenosis of a diagonal branch of the left anterior 

descending artery that originates from a stented segment of the left anterior 

descending. The left anterior descending artery stent is known to be an Alpine 

Xience 2.75 x 28 mm that was placed in 2021. The stent is widely patent and 

free of significant disease. The first obtuse marginal branch of the left 

circumflex artery is occluded in its proximal portion. The rest of the coronary 

vessels have diffuse mild to moderate plaque. Left ventricular end-diastolic 

pressure of 14 mmHg


- Echo of 3/9/21: LVEF 55-60%, grade 2 diastolic dysfunction, mild to mod LAE, 

mod MR, mild AI, mod TR, PASP 65-70 mmHg


- Cardiac cath of 22:  Multivessel coronary artery disease including long, 

up to 80% stenosis of the proximal and mid left anterior descending and 90% 

ostial stenosis of the first diagonal.  The left circumflex and the right 

coronary arteries have diffuse moderate disease.  The left circumflex artery, 

first obtuse marginal branch is occluded at its ostium.  The right coronary 

artery exhibits 60% proximal stenosis. Normal left ventricular end-diastolic 

pressure. Normal global left ventricular systolic function with an ejection 

fraction approximately 60% to 65%. - Referred to Dr. Tamez for consideration of

CABG





PAD


- Peripheral angiogram 21 showed 80 to 90% ostial stenosis of the right 

renal artery. Long segment of complete occlusion of the right superficial 

femoral artery that reconstitutes at the level of the right popliteal artery and

then there is a single vessel runoff in the right leg. Multiple 80% to 90% 

stenosis in the left superficial femoral artery and a 2-vessel runoff in the 

left leg (these two runoff vessels have severe proximal disease, as well)


- S/p R fem-pop by Dr Beck on 21. R leg swelling, post-op, followed by Dr Beck's office


- PCI to R leg by Dr Tamez for graft disease in 2022





Microcytic, hypochromic anemia


- of undetermined etiology, managed by pcp (H/H 10.5/33.9 and MCV 80 on 

2021)





Renal insufficiency


- likely CKD 2-3 and superimposed prerenal azotemia (BUN/Cr 60/1.99 on 21 

and 33/1.22 after d/c HCTZ)





Involuntary wgt loss


- (60 lbs since ) of undetermined etiology, managed by pcp





HTN:


- on chronic diuretic therapy





HLD:


- refuses stain or PCSK-19 inhibitors


- Reported intolerance to statin d/t joint pain





Abnormal ECG:


- ECG of 21 shows NSR and LAFB


- ECG of 22: NSR, LAFB, prob LVH





GI history:


- cholecystectomy in 2019; endoscopy in 2019 that showed mild gastritis (per pt 

report)





Fam h/o early CAD


- (father  of MI at age 54)





Carotid dz


- u/s of - showed minimal dz





FLYNN


- CPAP tx





Discussion and Recomendations








- resume home medications including BB, long acting nitrate, anti-platelet tx 

and increase calcium channel blocker


- 2L/NC of oxygen


- We have spoken again with Dr Tamez and have requested early transfer for 

consideration of CABG


 Monitor lab closely


- Replace electrolytes as indicated


- PRN sublingual nitro as needed


- low-dose ativan for anxiety that is currently playing a considerable role in 

her symptoms











BEBETO MAYA MD FACP FAC CCDS   2022 16:31

## 2022-04-12 NOTE — CARDIAC PROCEDURE NOTE-CS/ASA
Pre-Procedure Note


Pre-Op Procedure Note


H&P Reviewed


The H&P was reviewed, patient examined and no changes noted.


Date H&P Reviewed:  Apr 12, 2022


Time H&P Reviewed:  12:20





Conscious Sedation Pre-Proced


Time


12:20





ASA Score


3


For ASA 3 and 4: Consider anesthesia and medical clearance. Also, for patients

with a history of failed moderate sedation consider anesthesia.

















Airway 


 


Lungs 


 


Heart 


 


 ASA score


 


 ASA 1: a normal healthy patient


 


 ASA 2:  a patient with a mild systemic disease (mid diabetes, controlled 

hypertension, obesity 


 


 ASA 3:  a patient with a severe systemic disease that limits activity  (angina,

COPD, prior Myocardial infarction)


 


 ASA 4:  a patient with an incapacitating disease that is a constant threat to 

life (CHF, renal failure)


 


 ASA 5:  a moribund patient not expected to survive 24 hrs.  (ruptured aneurysm)


 


 ASA 6:  a declared brain-dead patient whose organs are being harvested.


 


 For emergent operations, add the letter E after the classification











Mallampati Classification


Grade 2





Sedation Plan


Analgesia, Amnesia, Plan communicated to team members, Discussed options with 

patient/fam, Discussed risks with patient/fam


The patient is an appropriate candidate to undergo the planned procedure, 

sedation, and anesthesia.





The patient immediately re-assessed prior to indication.











EZEKIEL CAMPOS MD FACP FAC CCDS   Apr 12, 2022 13:28

## 2022-04-12 NOTE — DISCHARGE INST-CARDIOLOGY
Discharge Inst-Cardiac


Discharge Medications


Continued Medications:  


Acetaminophen (Tylenol) 325 Mg Tablet


650 MG PO Q6H PRN for PAIN-MILD (1-4), TAB





Allopurinol (Allopurinol) 300 Mg Tablet


300 MG PO DAILY, TAB





Amlodipine Besylate (Amlodipine Besylate) 5 Mg Tablet


5 MG PO DAILY, TAB





Ascorbic Acid/Ascorbate Sodium (Vitamin C 500 mg Wafer) 500 Mg Wafer


500 MG PO BID, WAFER





Aspirin (Aspirin EC) 81 Mg Tablet.dr


81 MG PO HS, TAB





Carvedilol (Carvedilol) 25 Mg Tablet


25 MG PO BID, TAB





Clopidogrel Bisulfate (Clopidogrel) 75 Mg Tablet


75 MG PO DAILY, TAB





Cyanocobalamin (Vitamin B-12) (Vitamin B-12) 1,000 Mcg Tablet


1000 MCG PO DAILY, TAB





Escitalopram Oxalate (Escitalopram Oxalate) 10 Mg Tablet


10 MG PO DAILY, TAB





Ferrous Sulfate (Iron) 325 Mg Tablet


325 MG PO DAILY, TAB





Hydrocodone Bit/Acetaminophen (HYDROcodone/APAP 10/325 TABLET) 1 Ea Tab


1 EA PO Q6H PRN for PAIN-MODERATE (5-7), TAB





Isosorbide Mononitrate (Isosorbide Mononitrate ER) 120 Mg Tab.er.24h


120 MG PO DAILY, TAB





Multivitamin/Iron/Folic Acid (Centrum Adults Tablet) 1 Each Tablet


1 EACH PO DAILY, TAB





Nitroglycerin (Nitroglycerin) 0.4 Mg Tab.subl


0.4 MG SL UD PRN for CHEST PAIN, TAB





Ondansetron (Ondansetron Odt) 4 Mg Tab.rapdis


4 MG PO Q4H PRN for NAUSEA/VOMITING, TAB





Pantoprazole Sodium (Pantoprazole Sodium) 40 Mg Tablet.dr


40 MG PO DAILY, TAB

















EZEKIEL CAMPOS MD FACP FAC CCDS   Apr 12, 2022 13:31

## 2022-04-12 NOTE — CARDIAC CATHETERIZATION
DATE OF SERVICE:  04/12/2022



CARDIAC CATHETERIZATION REPORT



The patient is an 82-year-old lady with a history of coronary artery disease and

peripheral arterial disease.  She has lately been experiencing symptoms of

angina, not relieved with sublingual nitroglycerin.  Repeat cardiac

catheterization was carried out today after having obtained an informed consent.



DESCRIPTION OF PROCEDURE:

She was brought to the cardiac catheterization laboratory.  We prepared and

draped the left groin in the usual sterile fashion.  Lidocaine 1% was used for

local anesthesia.  Modified Seldinger technique was used to advance a 5-[place]

sheath into the left femoral artery.  We used 5-[place] JL4 catheter for left

coronary angiography, 5-[place] JR4 catheter for right coronary angiography and a

5-[place] pigtail catheter for left heart catheterization and left ventricular

angiography.  She tolerated the procedure well.  Manual pressure was used to

achieve hemostasis following sheath removal.



HEMODYNAMICS:

Left ventricular end-diastolic pressure following coronary angiography was 8

mmHg.  There was approximately 10 mm gradient on pullback across the aortic

valve.



CORONARY ANGIOGRAPHY:

Coronary calcification is seen.  Left main coronary artery is free of

significant disease.  Left anterior descending artery has significant disease in

its proximal and mid portions.  There is a patent stent in its mid portion, but

it does exhibit in-stent restenosis of up to approximately 70%, proximal and

distal to the stent, there are additional stenoses of up to approximately 80%. 

The first diagonal branch of the left anterior descending artery that arises

from the stented segment of the left anterior descending artery has 90% ostial

stenosis.  The left circumflex artery has mild diffuse disease.  The first

obtuse marginal branch of the left circumflex artery appears to be chronically

occluded.  This is unchanged compared to her previous study.  The right coronary

artery is dominant and has approximately 60% proximal stenosis.



LEFT VENTRICULAR ANGIOGRAPHY:

Left ventricular angiography was carried out in the right anterior oblique

projection.  Global left ventricular systolic function normal.  Left ventricular

ejection fraction is 60% to 65%.  There does not appear to be any distinct

regional wall motion abnormality.



CONCLUSIONS:

1.  Multivessel coronary artery disease including long, up to 80% stenosis of

the proximal and mid left anterior descending and 90% ostial stenosis of the

first diagonal.  The left circumflex and the right coronary arteries have

diffuse moderate disease.  The left circumflex artery, first obtuse marginal

branch is occluded at its ostium.  The right coronary artery exhibits 60%

proximal stenosis.

2.  Normal left ventricular end-diastolic pressure.

3.  Normal global left ventricular systolic function with an ejection fraction

approximately 60% to 65%.



DISCUSSION AND RECOMMENDATIONS:

Based on the results of the study, coronary artery bypass surgery appears to be

the best treatment option.  We have optimized her medical regimen.  We have

advised her to report to the emergency room in case of chest discomfort that is

not relieved with two successive nitroglycerin tablets taken 5 minutes apart. 

We have contacted Dr. Tamez of the cardiovascular surgical service at [facility], who will review the patient's films and we will see the patient in

consultation to ascertain suitability for coronary artery bypass surgery.





Job ID: [id number]

DocumentID: [id number]

Dictated Date:  04/12/2022 13:39:50

Transcription Date: 04/12/2022 13:59:13

Dictated By: EZEKIEL CAMPOS MD, MA, FACP, FACC,

MTDD

## 2022-04-13 LAB
BUN/CREAT SERPL: 21
CALCIUM SERPL-MCNC: 9.2 MG/DL (ref 8.5–10.1)
CHLORIDE SERPL-SCNC: 108 MMOL/L (ref 98–107)
CO2 SERPL-SCNC: 23 MMOL/L (ref 21–32)
CREAT SERPL-MCNC: 0.92 MG/DL (ref 0.6–1.3)
GFR SERPLBLD BASED ON 1.73 SQ M-ARVRAT: 63 ML/MIN
GLUCOSE SERPL-MCNC: 81 MG/DL (ref 70–105)
HCT VFR BLD CALC: 29 % (ref 35–52)
HGB BLD-MCNC: 9.1 G/DL (ref 11.5–16)
MAGNESIUM SERPL-MCNC: 2.1 MG/DL (ref 1.6–2.4)
MCH RBC QN AUTO: 25 PG (ref 25–34)
MCHC RBC AUTO-ENTMCNC: 31 G/DL (ref 32–36)
MCV RBC AUTO: 81 FL (ref 80–99)
PLATELET # BLD: 320 10^3/UL (ref 130–400)
PMV BLD AUTO: 9.8 FL (ref 9–12.2)
POTASSIUM SERPL-SCNC: 3.9 MMOL/L (ref 3.6–5)
SODIUM SERPL-SCNC: 143 MMOL/L (ref 135–145)
WBC # BLD AUTO: 7 10^3/UL (ref 4.3–11)

## 2022-04-13 RX ADMIN — SODIUM CHLORIDE SCH MLS/HR: 900 INJECTION, SOLUTION INTRAVENOUS at 09:57

## 2022-04-13 RX ADMIN — ACETAMINOPHEN PRN MG: 325 TABLET ORAL at 03:37

## 2022-04-13 RX ADMIN — AMLODIPINE BESYLATE SCH MG: 5 TABLET ORAL at 08:48

## 2022-04-13 NOTE — CONSULTATION - HOSPITALIST
HPI


History of Present Illness:


HPI/Chief Complaint


Laury Og is an 82 year old female with PMH HTN, HLD, PAD, CAD, anxiety, 

FLYNN, who was admitted with chest pain following a left heart catheterization. 

She was found to have multivessel coronary artery disease. She was started on IV

nitroglycerin and her pain improved. She is not having any pain on my exam. She 

is having some anxiety due to her medical conditions. She denies shortness of 

breath. She denies nausea and vomiting. She denies abdominal pain. She has been 

able to eat breakfast without issue.


Source:  patient


Exam Limitations:  no limitations


Date Seen


4/13/22


Attending Physician


Bebeto Maya MD Facp Facc Ccds


PCP


No,Local Physician


Referring Physician





Date of Admission








Home Medications & Allergies


Home Medications


Reviewed patient Home Medication Reconciliation performed by pharmacy medication

reconciliations technician and/or nursing.


Patients Allergies have been reviewed.





Allergies





Allergies


Coded Allergies


  Statins-Hmg-Coa Reductase Inhibitor (Unverified Allergy, Unknown, 3/9/21)


  meperidine (Unverified Allergy, Unknown, 3/9/21)








Past Medical-Social-Family Hx


Immunizations Up To Date


Date of Influenza Vaccine:  Oct 27, 2020


Date of Pneumonia Vaccine:  Oct 23, 2019





Current Status


Communicates:  Verbally


Primary Language:  English





Past Medical History


Surgeries:  Coronary Stent, Eye Surgery, Gallbladder, Orthopedic, Vascular 

Surgery


Sleep Apnea


Currently Using CPAP:  Yes


Currently Using BIPAP:  No


Cardiomyopathy, Coronary Artery Disease, Heart Murmur, Peripheral Vascular


Renal Failure





Family Medical History


No Pertinent Family Hx





Review of Systems


Constitutional:  no symptoms reported, see HPI





Physical Exam


Physical Exam


Vital Signs





Vital Signs - First Documented








 4/12/22 4/12/22





 09:25 14:30


 


Temp 36.4 


 


Pulse 63 


 


Resp 18 


 


B/P (MAP) 139/68 (91) 


 


Pulse Ox 93 


 


O2 Delivery Room Air 


 


O2 Flow Rate  2.00





Capillary Refill : Less Than 3 Seconds


Height, Weight, BMI


Height: '"


Weight: lbs. oz. kg; 25.73 BMI


Method:


General Appearance:  No Apparent Distress, WD/WN


Respiratory:  Lungs Clear, Normal Breath Sounds, No Respiratory Distress


Cardiovascular:  Regular Rate, Rhythm, No Edema, Systolic Murmur


Gastrointestinal:  Normal Bowel Sounds, Non Tender, Soft


Extremity:  Normal Inspection, Non Tender, No Pedal Edema


Neurologic/Psychiatric:  Alert, Oriented x3, Normal Mood/Affect


Skin:  Normal Color, Warm/Dry





Results


Results/Procedures


Labs


Laboratory Tests


4/12/22 09:28








4/13/22 04:25








Patient resulted labs reviewed.


Imaging:  Reviewed Imaging Report





Assessment/Plan


Assessment and Plan


Assess & Plan/Chief Complaint


Multi-vessel CAD


   Cardiology primary


   Left heart cath 4/12 with multivessel CAD


   Outpatient referral to Dr. Tamez for CABG evaluation





Anxiety


   Continue SSRI


   Add Xanax


   Ativan as needed





HTN


HLD


FLYNN


Anemia


PAD


   Continue home meds





Diagnosis/Problems


Diagnosis/Problems





(1) Multiple vessel coronary artery disease


Status:  Acute


(2) Anxiety


Status:  Acute











ROCK NDIAYE MD              Apr 13, 2022 17:11

## 2022-04-13 NOTE — PROGRESS NOTE - CARDIOLOGY
Cardiology SOAP Progress Note


Subjective:


Lying in bed


No c/o CP at this time


C/O HA


No c/o SOB at this





Objective:


I&O/Vital Signs





Side:  left


Groin site without hematoma:  Yes


Condition:  extremity w/d/p


Bruising:  mild bruising


Constitutional:  AAO x 3, well-developed, well-nourished


Respiratory:  No accessory muscle use, No respiratory distress; chest expansion 

is symmetric, chest is bilaterally symmetric, lungs clear to auscultation


Cardiovascular:  regular rate-rhythm; No JVD; S1 and S2, systolic murmur


Gastrointestional:  No tender; soft, round, audible bowel sounds


Extremities:  no lower extremity edema bilateral


Neurologic/Psychiatric:  other (moves all limbs equally, appears anxious)


Skin:  No rash on exposed areas, No ulcerations on exposed areas





Results/Procedures:


Labs





Microbiology


22 MRSA Screen - Final, Complete


          








A/P:


Assessment:


Recurrent angina


- unstable symptoms





CAD


- MPI of 3-9-21: Anteroapical ischemia, at least moderate in amount. 

Anteroapical hypokinesis. Well preserved global left ventricular systolic 

function with a calculated ejection fraction of 63%.


- Cardiac cath of 10-5-21 showed Coronary artery disease primarily consisting of

approximately 70% ostialstenosis of a diagonal branch of the left anterior 

descending artery that originates from a stented segment of the left anterior 

descending. The left anterior descending artery stent is known to be an Alpine 

Xience 2.75 x 28 mm that was placed in 2021. The stent is widely patent and 

free of significant disease. The first obtuse marginal branch of the left 

circumflex artery is occluded in its proximal portion. The rest of the coronary 

vessels have diffuse mild to moderate plaque. Left ventricular end-diastolic 

pressure of 14 mmHg


- Echo of 3/9/21: LVEF 55-60%, grade 2 diastolic dysfunction, mild to mod LAE, 

mod MR, mild AI, mod TR, PASP 65-70 mmHg


- Cardiac cath of 22:  Multivessel coronary artery disease including long, 

up to 80% stenosis of the proximal and mid left anterior descending and 90% 

ostial stenosis of the first diagonal.  The left circumflex and the right 

coronary arteries have diffuse moderate disease.  The left circumflex artery, 

first obtuse marginal branch is occluded at its ostium.  The right coronary 

artery exhibits 60% proximal stenosis. Normal left ventricular end-diastolic 

pressure. Normal global left ventricular systolic function with an ejection 

fraction approximately 60% to 65%. - Referred to Dr. Tamez for consideration of

CABG





PAD


- Peripheral angiogram 21 showed 80 to 90% ostial stenosis of the right re

nal artery. Long segment of complete occlusion of the right superficial femoral 

artery that reconstitutes at the level of the right popliteal artery and then 

there is a single vessel runoff in the right leg. Multiple 80% to 90% stenosis 

in the left superficial femoral artery and a 2-vessel runoff in the left leg 

(these two runoff vessels have severe proximal disease, as well)


- S/p R fem-pop by Dr Beck on 21. R leg swelling, post-op, followed by Dr Beck's office


- PCI to R leg by Dr Tamez for graft disease in 2022





Microcytic, hypochromic anemia


- of undetermined etiology, managed by pcp (H/H 10.5/33.9 and MCV 80 on 

1)





Renal insufficiency


- likely CKD 2-3 and superimposed prerenal azotemia (BUN/Cr 60/1.99 on 21 

and 33/1.22 after d/c HCTZ)





Involuntary wgt loss


- (60 lbs since 2019) of undetermined etiology, managed by pcp





HTN:


- on chronic diuretic therapy





HLD:


- refuses stain or PCSK-19 inhibitors


- Reported intolerance to statin d/t joint pain





Abnormal ECG:


- ECG of 21 shows NSR and LAFB


- ECG of 22: NSR, LAFB, prob LVH





GI history:


- cholecystectomy in 2019; endoscopy in 2019 that showed mild gastritis (per pt 

report)





Fam h/o early CAD


- (father  of MI at age 54)





Carotid dz


- u/s of 21 showed minimal dz





FLYNN


- CPAP tx


Plan:





- Continue home medications including BB, long acting nitrate, anti-platelet tx 

and increase calcium channel blocker


- Stop IV nitro and start oral long acting nitrate


- 2L/NC of oxygen


- We have spoken again with Dr Tamez this morning and have requested early 

transfer for consideration of CABG - he is agreeable - we will transfer


 Monitor lab closely


- PRN sublingual nitro as needed


- low-dose ativan for anxiety that is currently playing a considerable role in 

her symptoms











LUZ CACERES           2022 08:27

## 2022-04-14 NOTE — PROGRESS NOTE - CARDIOLOGY
Cardiology SOAP Progress Note


Subjective:


LATE ENTRY FOR MY VISIT TO THE PATIENT ON 22 AT 9 AM





Chest pain last night necessitated iv NTG and move to ER


No cp this am


Worried about the diagnosis of CAD. Wants to get over with surgery as soon as 

possible


No shortness of breath


No n/v/d


Gen malaise


Slept well last night





Objective:


Side:  left


Groin site without hematoma:  Yes


Condition:  extremity w/d/p


Bruising:  mild bruising


Constitutional:  AAO x 3, well-developed, well-nourished


Respiratory:  No accessory muscle use, No respiratory distress; chest expansion 

is symmetric, chest is bilaterally symmetric, lungs clear to auscultation


Cardiovascular:  regular rate-rhythm; No JVD; S1 and S2, systolic murmur


Gastrointestional:  No tender; soft, round, audible bowel sounds


Extremities:  no lower extremity edema bilateral


Neurologic/Psychiatric:  other (moves all limbs equally, appears anxious)


Skin:  No rash on exposed areas, No ulcerations on exposed areas





Results/Procedures:


Labs





Microbiology


22 MRSA Screen - Final, Complete


          





Laboratory Tests


22 04:25











A/P:


Assessment:





Recurrent angina


- unstable symptoms





CAD


- MPI of 3-9-21: Anteroapical ischemia, at least moderate in amount. 

Anteroapical hypokinesis. Well preserved global left ventricular systolic 

function with a calculated ejection fraction of 63%.


- Cardiac cath of 10-5-21 showed Coronary artery disease primarily consisting of

approximately 70% ostialstenosis of a diagonal branch of the left anterior 

descending artery that originates from a stented segment of the left anterior 

descending. The left anterior descending artery stent is known to be an Alpine 

Xience 2.75 x 28 mm that was placed in 2021. The stent is widely patent and 

free of significant disease. The first obtuse marginal branch of the left 

circumflex artery is occluded in its proximal portion. The rest of the coronary 

vessels have diffuse mild to moderate plaque. Left ventricular end-diastolic 

pressure of 14 mmHg


- Echo of 3/9/21: LVEF 55-60%, grade 2 diastolic dysfunction, mild to mod LAE, 

mod MR, mild AI, mod TR, PASP 65-70 mmHg


- Cardiac cath of 22:  Multivessel coronary artery disease including long, 

up to 80% stenosis of the proximal and mid left anterior descending and 90% 

ostial stenosis of the first diagonal.  The left circumflex and the right 

coronary arteries have diffuse moderate disease.  The left circumflex artery, 

first obtuse marginal branch is occluded at its ostium.  The right coronary 

artery exhibits 60% proximal stenosis. Normal left ventricular end-diastolic 

pressure. Normal global left ventricular systolic function with an ejection 

fraction approximately 60% to 65%. - Referred to Dr. Tamez for consideration of

CABG





PAD


- Peripheral angiogram 21 showed 80 to 90% ostial stenosis of the right 

renal artery. Long segment of complete occlusion of the right superficial 

femoral artery that reconstitutes at the level of the right popliteal artery and

then there is a single vessel runoff in the right leg. Multiple 80% to 90% 

stenosis in the left superficial femoral artery and a 2-vessel runoff in the 

left leg (these two runoff vessels have severe proximal disease, as well)


- S/p R fem-pop by Dr Beck on 21. R leg swelling, post-op, followed by Dr Beck's office


- PCI to R leg by Dr Tamez for graft disease in 2022





Microcytic, hypochromic anemia


- of undetermined etiology, managed by pcp (H/H 10.5/33.9 and MCV 80 on 

2021)





Renal insufficiency


- likely CKD 2-3 and superimposed prerenal azotemia (BUN/Cr 60/1.99 on 21 

and 33/1.22 after d/c HCTZ)





Involuntary wgt loss


- (60 lbs since 2019) of undetermined etiology, managed by pcp





HTN:


- on chronic diuretic therapy





HLD:


- refuses stain or PCSK-19 inhibitors


- Reported intolerance to statin d/t joint pain





Abnormal ECG:


- ECG of 21 shows NSR and LAFB


- ECG of 22: NSR, LAFB, prob LVH





GI history:


- cholecystectomy in 2019; endoscopy in 2019 that showed mild gastritis (per pt 

report)





Fam h/o early CAD


- (father  of MI at age 54)





Carotid dz


- u/s of 21 showed minimal dz





FLYNN


- CPAP tx


Plan:





- Continue home medications including BB, long acting nitrate, anti-platelet tx 

and increase calcium channel blocker


- Stop IV nitro and start oral long acting nitrate


- 2L/NC of oxygen


- low-dose ativan for anxiety that is currently playing a considerable role in 

her symptoms


- I personally call Dr Tamez this am again and spoke with him in detail. We 

requested transfer to his service at [facility] for consideration of CABG

because of unstable symptoms (no symptoms currently on the current regimen and 

clinically stable for transfer)














LATE ENTRY FOR MY VISIT TO THE PATIENT ON 22 AT 9 AM











EZEKIEL CAMPOS MD FACP FAC CCDS   2022 11:38

## 2022-04-14 NOTE — CARDIOLOGY DISCHARGE SUMMARY
Diagnosis/Chief Complaint


Date of Admission


22


Date of Discharge


22


Final/Discharge Diagnosis





Recurrent angina


- unstable symptoms





CAD


- MPI of 3-9-21: Anteroapical ischemia, at least moderate in amount. 

Anteroapical hypokinesis. Well preserved global left ventricular systolic 

function with a calculated ejection fraction of 63%.


- Cardiac cath of 10-5-21 showed Coronary artery disease primarily consisting of

approximately 70% ostialstenosis of a diagonal branch of the left anterior 

descending artery that originates from a stented segment of the left anterior 

descending. The left anterior descending artery stent is known to be an Alpine 

Xience 2.75 x 28 mm that was placed in 2021. The stent is widely patent and 

free of significant disease. The first obtuse marginal branch of the left 

circumflex artery is occluded in its proximal portion. The rest of the coronary 

vessels have diffuse mild to moderate plaque. Left ventricular end-diastolic 

pressure of 14 mmHg


- Echo of 3/9/21: LVEF 55-60%, grade 2 diastolic dysfunction, mild to mod LAE, 

mod MR, mild AI, mod TR, PASP 65-70 mmHg


- Cardiac cath of 22:  Multivessel coronary artery disease including long, 

up to 80% stenosis of the proximal and mid left anterior descending and 90% 

ostial stenosis of the first diagonal.  The left circumflex and the right 

coronary arteries have diffuse moderate disease.  The left circumflex artery, 

first obtuse marginal branch is occluded at its ostium.  The right coronary 

artery exhibits 60% proximal stenosis. Normal left ventricular end-diastolic 

pressure. Normal global left ventricular systolic function with an ejection 

fraction approximately 60% to 65%. - Referred to Dr. Tamez for consideration of

CABG





PAD


- Peripheral angiogram 21 showed 80 to 90% ostial stenosis of the right 

renal artery. Long segment of complete occlusion of the right superficial 

femoral artery that reconstitutes at the level of the right popliteal artery and

then there is a single vessel runoff in the right leg. Multiple 80% to 90% 

stenosis in the left superficial femoral artery and a 2-vessel runoff in the 

left leg (these two runoff vessels have severe proximal disease, as well)


- S/p R fem-pop by Dr Beck on 21. R leg swelling, post-op, followed by Dr Beck's office


- PCI to R leg by Dr Tamez for graft disease in 2022





Microcytic, hypochromic anemia


- of undetermined etiology, managed by pcp (H/H 10.5/33.9 and MCV 80 on 

2021)





Renal insufficiency


- likely CKD 2-3 and superimposed prerenal azotemia (BUN/Cr 60/1.99 on 21 

and 33/1.22 after d/c HCTZ)





Involuntary wgt loss


- (60 lbs since 2019) of undetermined etiology, managed by pcp





HTN:


- on chronic diuretic therapy





HLD:


- refuses stain or PCSK-19 inhibitors


- Reported intolerance to statin d/t joint pain





Abnormal ECG:


- ECG of 21 shows NSR and LAFB


- ECG of 22: NSR, LAFB, prob LVH





GI history:


- cholecystectomy in 2019; endoscopy in 2019 that showed mild gastritis (per pt 

report)





Fam h/o early CAD


- (father  of MI at age 54)





Carotid dz


- u/s of 21 showed minimal dz





FLYNN


- CPAP tx





Chief Complaint/HPI


Chief Complaint/HPI





81 yo woman with known CAD who has been having intermittent chest pain that has 

been relieved with s/l NTG. Today, underwent card cath and found to have 

considerable CAD. Referred for CABG to Dr Tamez at [facility]. Original plan 

was to d/c after cath with outpt f/u with Dr Tamez. But she started to have cp 

relieved with s/l NTG and supple oxygen in the recovery room (post-cath). Also 

notes considerable anxiety. Wishes to get CABG over with as quickly as possible.

Has been admitted to the hosp because of ongoing symptoms. Denies palp or 

syncope. Has chronic, mod, exertional shortness of breath. Denies swelling.





Transferred to Dr Tamez at [facility], [place], [place], on 22 for 

consideration of urgent CABG, given patient's continuing, unstable symptoms. 

Stable at time of transfer.





Discharge Summary


Discussion & Recommendations


Home Medications


Reviewed patient Home Medication Reconciliation performed by pharmacy medication

reconciliations technician and/or nursing.


Patients Allergies have been reviewed.





Discharge


Home Medications:


Reviewed and agree with Discharge Medication list on patient's Discharge 

Instruction sheet


Instructions to patient/family


F/u with Dr Maya 2 weeks


F/u with Dr Tamez next EZEKIEL Restrepo MD FACP FACC CCDS   2022 16:52

## 2022-04-28 ENCOUNTER — HOSPITAL ENCOUNTER (OUTPATIENT)
Dept: HOSPITAL 75 - SLEEP | Age: 82
LOS: 1 days | Discharge: HOME | End: 2022-04-29
Attending: NURSE PRACTITIONER
Payer: MEDICARE

## 2022-04-28 DIAGNOSIS — G47.33: Primary | ICD-10-CM

## 2022-04-28 PROCEDURE — 95811 POLYSOM 6/>YRS CPAP 4/> PARM: CPT
